# Patient Record
Sex: FEMALE | Race: WHITE | NOT HISPANIC OR LATINO | Employment: OTHER | URBAN - METROPOLITAN AREA
[De-identification: names, ages, dates, MRNs, and addresses within clinical notes are randomized per-mention and may not be internally consistent; named-entity substitution may affect disease eponyms.]

---

## 2017-01-03 ENCOUNTER — OFFICE VISIT (OUTPATIENT)
Dept: URGENT CARE | Facility: CLINIC | Age: 62
End: 2017-01-03
Payer: COMMERCIAL

## 2017-01-03 VITALS
RESPIRATION RATE: 12 BRPM | WEIGHT: 170 LBS | TEMPERATURE: 99 F | DIASTOLIC BLOOD PRESSURE: 80 MMHG | HEIGHT: 63 IN | BODY MASS INDEX: 30.12 KG/M2 | OXYGEN SATURATION: 98 % | SYSTOLIC BLOOD PRESSURE: 120 MMHG | HEART RATE: 78 BPM

## 2017-01-03 DIAGNOSIS — S61.012A LACERATION OF LEFT THUMB, INITIAL ENCOUNTER: ICD-10-CM

## 2017-01-03 PROCEDURE — 99213 OFFICE O/P EST LOW 20 MIN: CPT | Performed by: PHYSICIAN ASSISTANT

## 2017-01-03 ASSESSMENT — ENCOUNTER SYMPTOMS
FEVER: 0
SENSORY CHANGE: 0
VOMITING: 0
FOCAL WEAKNESS: 0
NAUSEA: 0
CHILLS: 0
ROS SKIN COMMENTS: LACERATION OF LEFT THUMB
TINGLING: 0

## 2017-01-03 NOTE — MR AVS SNAPSHOT
"        Mari Kinsey   1/3/2017 6:45 PM   Office Visit   MRN: [id number]    Department:  [facility]   Dept Phone:  [phone]    Description:  Female : 1955   Provider:  Crista rGant PA-C           Reason for Visit     Laceration finger, washing dishes       Allergies as of 1/3/2017     Allergen Noted Reactions    Penicillins 2010   Rash      You were diagnosed with     Laceration of left thumb, initial encounter   [[id number]]         Vital Signs     Blood Pressure Pulse Temperature Respirations Height Weight    120/80 mmHg 78 37.2 °C (99 °F) 12 1.6 m (5' 2.99\") 77.111 kg (170 lb)    Body Mass Index Oxygen Saturation Breastfeeding? Smoking Status          30.12 kg/m2 98% No Passive Smoke Exposure - Never Smoker        Basic Information     Date Of Birth Sex Race Ethnicity Preferred Language    1955 Female White Non- English      Your appointments     2017  2:00 PM   Non Provider 1 with [facility])    [address]  [facility] [id number] [phone]           You will be receiving a confirmation call a few days before your appointment from our automated call confirmation system.            2017  1:30 PM   Annual Exam with Felicia Roca M.D.   [facility]    [address]  [place] [id number] [phone]              Problem List              ICD-10-CM Priority Class Noted - Resolved    Hypothyroid (Chronic) E03.9   2010 - Present    Preventative health care (Chronic) Z00.00   2010 - Present    Bunion of great toe of left foot M21.612   2010 - Present    Deviated nasal septum J34.2   2010 - Present    Dyslipidemia (Chronic) E78.5   2010 - Present    Sleep apnea (Chronic) G47.30   2010 - Present    History of allergic rhinitis (Chronic) Z87.09   2011 - Present    Skin lesion L98.9   2014 - " Present    Family history of breast cancer in sister Z80.3   12/19/2014 - Present    Macular degeneration H35.30   12/19/2014 - Present    Obesity E66.9   12/19/2014 - Present    Microscopic hematuria R31.29   10/16/2015 - Present    Lymphoma (HCC) (Chronic) C85.90   2/11/2016 - Present      Health Maintenance        Date Due Completion Dates    MAMMOGRAM 11/25/2017 11/25/2016, 11/21/2012    IMM PNEUMOCOCCAL 19-64 (ADULT) HIGHEST RISK SERIES (2 of 3 - PCV13) 12/22/2017 12/22/2016    PAP SMEAR 2/22/2019 2/22/2016, 2/2/2015, 1/30/2014    COLONOSCOPY 11/17/2020 11/17/2010    IMM DTaP/Tdap/Td Vaccine (2 - Td) 12/9/2021 12/9/2011            Current Immunizations     Hepatitis B Vaccine Recombivax (Adol/Adult) 12/22/2016  1:37 PM    Influenza TIV (IM) 12/13/2013, 12/13/2012, 12/9/2011 10:01 AM    Influenza Vaccine Quad Inj (Pf) 10/21/2016  9:00 AM, 9/8/2015  1:47 PM, 9/2/2014    Pneumococcal polysaccharide vaccine (PPSV-23) 12/22/2016  1:38 PM    SHINGLES VACCINE 9/2/2014    Tdap Vaccine 12/9/2011 10:02 AM      Below and/or attached are the medications your provider expects you to take. Review all of your home medications and newly ordered medications with your provider and/or pharmacist. Follow medication instructions as directed by your provider and/or pharmacist. Please keep your medication list with you and share with your provider. Update the information when medications are discontinued, doses are changed, or new medications (including over-the-counter products) are added; and carry medication information at all times in the event of emergency situations     Allergies:  PENICILLINS - Rash               Medications  Valid as of: January 04, 2017 - 11:09 AM    Generic Name Brand Name Tablet Size Instructions for use    Levothyroxine Sodium (Tab) SYNTHROID 125 MCG Take 1 Tab by mouth Every morning on an empty stomach.        .                 Medicines prescribed today were sent to:     [facility]/PHARMACY #[id number] - [place] -  [address]    [address] [place] [address]    Phone: [phone] Fax: [phone]    Open 24 Hours?: No    DME [facility] [place]    [address] [place] [address]    Phone: [phone] Fax: [phone]      Medication refill instructions:       If your prescription bottle indicates you have medication refills left, it is not necessary to call your provider’s office. Please contact your pharmacy and they will refill your medication.    If your prescription bottle indicates you do not have any refills left, you may request refills at any time through one of the following ways: The online [employer] system (except Urgent Care), by calling your provider’s office, or by asking your pharmacy to contact your provider’s office with a refill request. Medication refills are processed only during regular business hours and may not be available until the next business day. Your provider may request additional information or to have a follow-up visit with you prior to refilling your medication.   *Please Note: Medication refills are assigned a new Rx number when refilled electronically. Your pharmacy may indicate that no refills were authorized even though a new prescription for the same medication is available at the pharmacy. Please request the medicine by name with the pharmacy before contacting your provider for a refill.        Other Notes About Your Plan       12/22/16 hep b vaccine first in series               MyChart Status: Patient Declined

## 2017-01-04 NOTE — PROGRESS NOTES
Subjective:      Mari Kinsey is a 61 y.o. female who presents with Laceration            Laceration   The incident occurred less than 1 hour ago (patient cut left thumb fingertip while washing a knife ). Pain location: palmar tip of left thumb  The laceration is 1 cm in size. The laceration mechanism was a clean knife. The pain is moderate. The pain has been constant since onset. She reports no foreign bodies present. Her tetanus status is UTD (2011 ).   the patient denies numbness or tingling. She has no joint pain.     Past Medical History   Diagnosis Date   • Thyroid disease    • Dental disorder      upper bridge   • Sleep apnea      cpap   • Snoring      sleep study done   • ALLEN (obstructive sleep apnea)    • Hypothyroidism    • Chickenpox    • Greek measles    • Mumps    • Influenza    • Tonsillitis        Past Surgical History   Procedure Laterality Date   • Other       bone graft to ;hand   • Nasal septal reconstruction     • Bunionectomy     • Tonsillectomy     • Hysteroscopy with video diagnostic     • Bone graft       at 19 years of age   • Kale by laparoscopy  2/3/2016     Procedure: KALE BY LAPAROSCOPY;  Surgeon: Tricia Farah M.D.;  Location: SURGERY SAME DAY [facility] ORS;  Service:    • Lymph node sampling  2/3/2016     Procedure: LYMPH NODE SAMPLING EXCISION, BIOPSY;  Surgeon: Tricia Farah M.D.;  Location: SURGERY SAME DAY [facility] ORS;  Service:        Family History   Problem Relation Age of Onset   • Heart Disease Father    • Arthritis Mother      osteoporosis   • Sleep Apnea Neg Hx        Allergies   Allergen Reactions   • Penicillins Rash       Medications, Allergies, and current problem list reviewed today in Epic      Review of Systems   Constitutional: Negative for fever and chills.   Gastrointestinal: Negative for nausea and vomiting.   Musculoskeletal: Negative for joint pain.   Skin:        Laceration of left thumb    Neurological: Negative for tingling, sensory change  "and focal weakness.     All other systems reviewed and are negative.        Objective:     /80 mmHg  Pulse 78  Temp(Src) 37.2 °C (99 °F)  Resp 12  Ht 1.6 m (5' 2.99\")  Wt 77.111 kg (170 lb)  BMI 30.12 kg/m2  SpO2 98%  Breastfeeding? No     Physical Exam   Constitutional: She is oriented to person, place, and time. She appears well-developed and well-nourished. No distress.   HENT:   Head: Normocephalic and atraumatic.   Eyes: Conjunctivae are normal.   Pulmonary/Chest: Effort normal. No respiratory distress.   Musculoskeletal:        Hands:  Neurological: She is alert and oriented to person, place, and time. No cranial nerve deficit.   Skin: She is not diaphoretic.   Psychiatric: She has a normal mood and affect. Her behavior is normal. Judgment and thought content normal.               Assessment/Plan:     1. Laceration of left thumb, initial encounter    - Superficial wound. Suture placement not appropriate.   - Wound cleansed with copious amounts of saline. Hemostasis achieved with pressure dressing.   - TDAP UTD  -  Antibiotic ointment applied and Wound dressed.   - wound care discussed.   - no antibiotics necessary  .     Differential diagnoses, Supportive care, and indications for immediate follow-up discussed with patient.   Instructed to return to clinic or nearest emergency department for any change in condition, further concerns, or worsening of symptoms.    The patient demonstrated a good understanding and agreed with the treatment plan.    Crista Grant PA-C        "

## 2017-01-23 ENCOUNTER — APPOINTMENT (OUTPATIENT)
Dept: MEDICAL GROUP | Facility: MEDICAL CENTER | Age: 62
End: 2017-01-23
Payer: COMMERCIAL

## 2017-01-26 ENCOUNTER — TELEPHONE (OUTPATIENT)
Dept: MEDICAL GROUP | Facility: MEDICAL CENTER | Age: 62
End: 2017-01-26

## 2017-01-26 ENCOUNTER — NON-PROVIDER VISIT (OUTPATIENT)
Dept: MEDICAL GROUP | Facility: MEDICAL CENTER | Age: 62
End: 2017-01-26

## 2017-01-26 DIAGNOSIS — Z00.00 PREVENTATIVE HEALTH CARE: Chronic | ICD-10-CM

## 2017-01-26 NOTE — TELEPHONE ENCOUNTER
Patient came in for nurse visit appointment to receive second in series hepatitis B vaccination.    Unfortunately we are out of hepatitis B vaccines    Prescription printed for Recombivax 40 µg to get at the pharmacy second series

## 2017-01-27 NOTE — PROGRESS NOTES
Subjective:      Mari Kinsey is a 61 y.o. female who presents with No chief complaint on file.            HPI    ROS       Objective:     There were no vitals taken for this visit.     Physical Exam            Assessment/Plan:     There are no diagnoses linked to this encounter.

## 2017-02-03 ENCOUNTER — NON-PROVIDER VISIT (OUTPATIENT)
Dept: MEDICAL GROUP | Facility: MEDICAL CENTER | Age: 62
End: 2017-02-03
Payer: COMMERCIAL

## 2017-02-03 DIAGNOSIS — Z23 NEED FOR HEPATITIS B VACCINATION: ICD-10-CM

## 2017-02-03 PROCEDURE — 99999 PR NO CHARGE: CPT | Performed by: INTERNAL MEDICINE

## 2017-02-03 PROCEDURE — 90471 IMMUNIZATION ADMIN: CPT | Performed by: INTERNAL MEDICINE

## 2017-02-03 PROCEDURE — 90746 HEPB VACCINE 3 DOSE ADULT IM: CPT | Performed by: INTERNAL MEDICINE

## 2017-02-03 NOTE — PROGRESS NOTES
"Mari Kinsey is a 61 y.o. female here for a non-provider visit for:   HEPATITIS B     Reason for immunization: continue or complete series started at the office  Immunization records indicate need for vaccine: Yes  Minimum interval has been met for this vaccine: Yes  ABN completed: Not Indicated    VIS Dated  7/20/16 was given to patient Yes  All IAC Questionnaire questions were answered “No.\"    Patient tolerated injection and no adverse effects were observed or reported yes.    Pt scheduled for next dose in series: No        "

## 2017-02-03 NOTE — MR AVS SNAPSHOT
Mari Kinsey   2/3/2017 2:30 PM   Non-Provider Visit   MRN: [id number]    Department:  [facility]   Dept Phone:  [phone]    Description:  Female : 1955   Provider:  DAMON RUTHERFORD MA           Allergies as of 2/3/2017     Allergen Noted Reactions    Penicillins 2010   Rash      You were diagnosed with     Need for hepatitis B vaccination   [[id number]]         Vital Signs     Smoking Status                   Passive Smoke Exposure - Never Smoker           Basic Information     Date Of Birth Sex Race Ethnicity Preferred Language    1955 Female White Non- English      Your appointments     2017  1:30 PM   Annual Exam with Felicia Roca M.D.   [facility]    [address]  [place] [id number] [phone]            Aug 04, 2017  2:00 PM   Non Provider 1 with DAMON RUTHERFORD MA   [facility])    [address]  [place] [id number] [phone]           You will be receiving a confirmation call a few days before your appointment from our automated call confirmation system.              Problem List              ICD-10-CM Priority Class Noted - Resolved    Hypothyroid (Chronic) E03.9   2010 - Present    Preventative health care (Chronic) Z00.00   2010 - Present    Bunion of great toe of left foot M21.612   2010 - Present    Deviated nasal septum J34.2   2010 - Present    Dyslipidemia (Chronic) E78.5   2010 - Present    Sleep apnea (Chronic) G47.30   2010 - Present    History of allergic rhinitis (Chronic) Z87.09   2011 - Present    Skin lesion L98.9   2014 - Present    Family history of breast cancer in sister Z80.3   2014 - Present    Macular degeneration H35.30   2014 - Present    Obesity E66.9   2014 - Present    Microscopic hematuria R31.29   10/16/2015 - Present    Lymphoma (HCC) (Chronic)  C85.90   2/11/2016 - Present      Health Maintenance        Date Due Completion Dates    MAMMOGRAM 11/25/2017 11/25/2016, 11/21/2012    IMM PNEUMOCOCCAL 19-64 (ADULT) HIGHEST RISK SERIES (2 of 3 - PCV13) 12/22/2017 12/22/2016    PAP SMEAR 2/22/2019 2/22/2016, 2/2/2015, 1/30/2014    COLONOSCOPY 11/17/2020 11/17/2010    IMM DTaP/Tdap/Td Vaccine (2 - Td) 12/9/2021 12/9/2011            Current Immunizations     Hepatitis B Vaccine Recombivax (Adol/Adult)  Incomplete, 12/22/2016  1:37 PM    Influenza TIV (IM) 12/13/2013, 12/13/2012, 12/9/2011 10:01 AM    Influenza Vaccine Quad Inj (Pf) 10/21/2016  9:00 AM, 9/8/2015  1:47 PM, 9/2/2014    Pneumococcal polysaccharide vaccine (PPSV-23) 12/22/2016  1:38 PM    SHINGLES VACCINE 9/2/2014    Tdap Vaccine 12/9/2011 10:02 AM      Below and/or attached are the medications your provider expects you to take. Review all of your home medications and newly ordered medications with your provider and/or pharmacist. Follow medication instructions as directed by your provider and/or pharmacist. Please keep your medication list with you and share with your provider. Update the information when medications are discontinued, doses are changed, or new medications (including over-the-counter products) are added; and carry medication information at all times in the event of emergency situations     Allergies:  PENICILLINS - Rash               Medications  Valid as of: February 03, 2017 -  2:51 PM    Generic Name Brand Name Tablet Size Instructions for use    Levothyroxine Sodium (Tab) SYNTHROID 125 MCG Take 1 Tab by mouth Every morning on an empty stomach.        .                 Medicines prescribed today were sent to:     [facility]/PHARMACY #[id number] - [place] - [address]    [address] [place] [address]    Phone: [phone] Fax: [phone]    Open 24 Hours?: No    DME [facility] [place]    [address] [place] [address]    Phone: [phone] Fax: [phone]      Medication  refill instructions:       If your prescription bottle indicates you have medication refills left, it is not necessary to call your provider’s office. Please contact your pharmacy and they will refill your medication.    If your prescription bottle indicates you do not have any refills left, you may request refills at any time through one of the following ways: The online [employer] system (except Urgent Care), by calling your provider’s office, or by asking your pharmacy to contact your provider’s office with a refill request. Medication refills are processed only during regular business hours and may not be available until the next business day. Your provider may request additional information or to have a follow-up visit with you prior to refilling your medication.   *Please Note: Medication refills are assigned a new Rx number when refilled electronically. Your pharmacy may indicate that no refills were authorized even though a new prescription for the same medication is available at the pharmacy. Please request the medicine by name with the pharmacy before contacting your provider for a refill.        Other Notes About Your Plan       12/22/16 hep b vaccine first in series  1/26/17 hep b recombivax printed to take to pharmacy  1/17/17  oncology note, doing well with recent CT showing no evidence of adenopathy, repeat labs prior to next visit CBC, CMP, LDH, beta-2 microglobulin, follow-up 6 months               [facility] Status: Patient Declined

## 2017-02-23 ENCOUNTER — HOSPITAL ENCOUNTER (OUTPATIENT)
Facility: MEDICAL CENTER | Age: 62
End: 2017-02-23
Attending: OBSTETRICS & GYNECOLOGY
Payer: COMMERCIAL

## 2017-02-23 ENCOUNTER — GYNECOLOGY VISIT (OUTPATIENT)
Dept: OBGYN | Facility: MEDICAL CENTER | Age: 62
End: 2017-02-23
Payer: COMMERCIAL

## 2017-02-23 VITALS
WEIGHT: 179 LBS | SYSTOLIC BLOOD PRESSURE: 100 MMHG | DIASTOLIC BLOOD PRESSURE: 70 MMHG | HEIGHT: 63 IN | BODY MASS INDEX: 31.71 KG/M2

## 2017-02-23 DIAGNOSIS — Z12.4 ROUTINE CERVICAL SMEAR: ICD-10-CM

## 2017-02-23 DIAGNOSIS — Z11.51 SPECIAL SCREENING EXAMINATION FOR HUMAN PAPILLOMAVIRUS (HPV): ICD-10-CM

## 2017-02-23 DIAGNOSIS — Z01.419 WELL WOMAN EXAM: ICD-10-CM

## 2017-02-23 PROCEDURE — 99396 PREV VISIT EST AGE 40-64: CPT | Performed by: OBSTETRICS & GYNECOLOGY

## 2017-02-23 PROCEDURE — 87624 HPV HI-RISK TYP POOLED RSLT: CPT

## 2017-02-23 PROCEDURE — 88175 CYTOPATH C/V AUTO FLUID REDO: CPT

## 2017-02-23 NOTE — PROGRESS NOTES
Mari Kinsey is a 61 y.o. y.o. female who presents for her Gynecologic Exam        HPI Comments: Pt presents for well woman exam. Pt has no complaints. Lymphoma is improved and almost resolved - does not need Chemo. Pt also seeing Dr. Perez for pelvic floor exercises and started on a estrogen cream. No LMP recorded. Patient is postmenopausal.  .  Review of Systems   Pertinent positives documented in HPI and all other systems reviewed & are negative    All PMH, PSH, allergies, social history and FH reviewed and updated today:  Past Medical History   Diagnosis Date   • Thyroid disease    • Dental disorder      upper bridge   • Sleep apnea      cpap   • Snoring      sleep study done   • ALLEN (obstructive sleep apnea)    • Hypothyroidism    • Chickenpox    • Kazakh measles    • Mumps    • Influenza    • Tonsillitis      Past Surgical History   Procedure Laterality Date   • Other       bone graft to ;hand   • Nasal septal reconstruction     • Bunionectomy     • Tonsillectomy     • Hysteroscopy with video diagnostic     • Bone graft       at 19 years of age   • Kale by laparoscopy  2/3/2016     Procedure: KALE BY LAPAROSCOPY;  Surgeon: Tricia Farah M.D.;  Location: SURGERY SAME DAY [facility] ORS;  Service:    • Lymph node sampling  2/3/2016     Procedure: LYMPH NODE SAMPLING EXCISION, BIOPSY;  Surgeon: Tricia Farah M.D.;  Location: SURGERY SAME DAY [facility] ORS;  Service:      Penicillins  Social History     Social History   • Marital Status:      Spouse Name: N/A   • Number of Children: N/A   • Years of Education: N/A     Social History Main Topics   • Smoking status: Passive Smoke Exposure - Never Smoker   • Smokeless tobacco: Never Used      Comment: parents smoke when she was a child   • Alcohol Use: No   • Drug Use: No   • Sexual Activity: Not Asked     Other Topics Concern   • None     Social History Narrative     Family History   Problem Relation Age of Onset   • Heart Disease Father    •  "Arthritis Mother      osteoporosis   • Sleep Apnea Neg Hx      Medications:   Current Outpatient Prescriptions Ordered in [facility]   Medication Sig Dispense Refill   • levothyroxine (SYNTHROID) 125 MCG Tab Take 1 Tab by mouth Every morning on an empty stomach. 90 Tab 3     No current Epic-ordered facility-administered medications on file.          Objective:   Vital measurements:  Blood pressure 100/70, height 1.6 m (5' 2.99\"), weight 81.194 kg (179 lb).  Body mass index is 31.72 kg/(m^2). (Goal BM I>18 <25)    Physical Exam   Nursing note and vitals reviewed.  Constitutional: She is oriented to person, place, and time. She appears well-developed and well-nourished. No distress.     HEENT:   Head: Normocephalic and atraumatic.   Right Ear: External ear normal.   Left Ear: External ear normal.   Nose: Nose normal.   Eyes: Conjunctivae and EOM are normal. Pupils are equal, round, and reactive to light. No scleral icterus.     Neck: Normal range of motion. Neck supple. No tracheal deviation present. No thyromegaly present.     Pulmonary/Chest: Effort normal and breath sounds normal. No respiratory distress. She has no wheezes. She has no rales. She exhibits no tenderness.     Cardiovascular: Regular, rate and rhythm. No JVD.    Abdominal: Soft. Bowel sounds are normal. She exhibits no distension and no mass. No tenderness. She has no rebound and no guarding.     Breast:  Symmetrical, normal consistency without masses., No dimpling or skin changes, Normal nipples without discharge, no axillary lymphadenopathy, negative    Genitourinary:  Pelvic exam was performed with patient supine.  External genitalia with no abnormal pigmentation, labial fusion,rash, tenderness, lesion or injury to the labia bilaterally.  Vagina is moist with no lesions, foul discharge, erythema, tenderness or bleeding. No foreign body around the vagina or signs of injury. cystocele and rectocele.  Cervix exhibits no motion tenderness, no discharge and " no friability.   Uterus is AV not deviated, not enlarged, not fixed and not tender.  Right adnexum displays no mass, no tenderness and no fullness. Left adnexum displays no mass, no tenderness and no fullness.     Musculoskeletal: Normal range of motion. She exhibits no edema and no tenderness.     Lymphadenopathy: She has no cervical adenopathy.     Neurological: She is alert and oriented to person, place, and time. She exhibits normal muscle tone.     Skin: Skin is warm and dry. No rash noted. She is not diaphoretic. No erythema. No pallor.     Psychiatric: She has a normal mood and affect. Her behavior is normal. Judgment and thought content normal.               Assessment:     1. Well woman exam  THINPREP PAP WITH HPV   2. Routine cervical smear  THINPREP PAP WITH HPV   3. Special screening examination for human papillomavirus (HPV)  THINPREP PAP WITH HPV         Plan:   Pap and physical exam performed  Monthly SBE.  Counseling: breast self exam, mammography screening, use and side effects of HRT, menopause, osteoporosis and adequate intake of calcium and vitamin D  Encourage exercise and proper diet.  Mammograms starting @ age 40 annually.  See medications and orders placed in encounter report.

## 2017-02-23 NOTE — MR AVS SNAPSHOT
"        Mari Kinsey   2017 1:30 PM   Gynecology Visit   MRN: [id number]    Department:  [facility]   Dept Phone:  [phone]    Description:  Female : 1955   Provider:  Felicia Roca M.D.           Reason for Visit     Gynecologic Exam Annual exam       Allergies as of 2017     Allergen Noted Reactions    Penicillins 2010   Rash      You were diagnosed with     Well woman exam   [859638]       Routine cervical smear   [760704]       Special screening examination for human papillomavirus (HPV)   [V73.81.ICD-9-CM]         Vital Signs     Blood Pressure Height Weight Body Mass Index Smoking Status       100/70 mmHg 1.6 m (5' 2.99\") 81.194 kg (179 lb) 31.72 kg/m2 Passive Smoke Exposure - Never Smoker       Basic Information     Date Of Birth Sex Race Ethnicity Preferred Language    1955 Female White Non- English      Your appointments     Aug 04, 2017  2:00 PM   Non Provider 1 with [facility] ([facility])    [address]  [facility] [address]   [phone]           You will be receiving a confirmation call a few days before your appointment from our automated call confirmation system.              Problem List              ICD-10-CM Priority Class Noted - Resolved    Hypothyroid (Chronic) E03.9   2010 - Present    Preventative health care (Chronic) Z00.00   2010 - Present    Bunion of great toe of left foot M21.612   2010 - Present    Deviated nasal septum J34.2   2010 - Present    Dyslipidemia (Chronic) E78.5   2010 - Present    Sleep apnea (Chronic) G47.30   2010 - Present    History of allergic rhinitis (Chronic) Z87.09   2011 - Present    Skin lesion L98.9   2014 - Present    Family history of breast cancer in sister Z80.3   2014 - Present    Macular degeneration H35.30   2014 - Present    Obesity E66.9   2014 - Present    Microscopic hematuria " R31.29   10/16/2015 - Present    Lymphoma (HCC) (Chronic) C85.90   2/11/2016 - Present      Health Maintenance        Date Due Completion Dates    MAMMOGRAM 11/25/2017 11/25/2016, 11/21/2012    IMM PNEUMOCOCCAL 19-64 (ADULT) HIGHEST RISK SERIES (2 of 3 - PCV13) 12/22/2017 12/22/2016    PAP SMEAR 2/23/2020 2/23/2017 (Done), 2/22/2016, 2/2/2015, 1/30/2014    Override on 2/23/2017: Done    COLONOSCOPY 11/17/2020 11/17/2010    IMM DTaP/Tdap/Td Vaccine (2 - Td) 12/9/2021 12/9/2011            Current Immunizations     Hepatitis B Vaccine Recombivax (Adol/Adult) 2/3/2017  3:00 PM, 12/22/2016  1:37 PM    Influenza TIV (IM) 12/13/2013, 12/13/2012, 12/9/2011 10:01 AM    Influenza Vaccine Quad Inj (Pf) 10/21/2016  9:00 AM, 9/8/2015  1:47 PM, 9/2/2014    Pneumococcal polysaccharide vaccine (PPSV-23) 12/22/2016  1:38 PM    SHINGLES VACCINE 9/2/2014    Tdap Vaccine 12/9/2011 10:02 AM      Below and/or attached are the medications your provider expects you to take. Review all of your home medications and newly ordered medications with your provider and/or pharmacist. Follow medication instructions as directed by your provider and/or pharmacist. Please keep your medication list with you and share with your provider. Update the information when medications are discontinued, doses are changed, or new medications (including over-the-counter products) are added; and carry medication information at all times in the event of emergency situations     Allergies:  PENICILLINS - Rash               Medications  Valid as of: February 23, 2017 -  2:21 PM    Generic Name Brand Name Tablet Size Instructions for use    Levothyroxine Sodium (Tab) SYNTHROID 125 MCG Take 1 Tab by mouth Every morning on an empty stomach.        .                 Medicines prescribed today were sent to:     [facility]/PHARMACY #[id number] - [place], [place] - [address]    [address] [place] [address]    Phone: [phone] Fax: [phone]    Open 24 Hours?: No    DME  [facility]    [address] [place] [address]    Phone: [phone] Fax: [phone]      Medication refill instructions:       If your prescription bottle indicates you have medication refills left, it is not necessary to call your provider’s office. Please contact your pharmacy and they will refill your medication.    If your prescription bottle indicates you do not have any refills left, you may request refills at any time through one of the following ways: The online [facility] system (except Urgent Care), by calling your provider’s office, or by asking your pharmacy to contact your provider’s office with a refill request. Medication refills are processed only during regular business hours and may not be available until the next business day. Your provider may request additional information or to have a follow-up visit with you prior to refilling your medication.   *Please Note: Medication refills are assigned a new Rx number when refilled electronically. Your pharmacy may indicate that no refills were authorized even though a new prescription for the same medication is available at the pharmacy. Please request the medicine by name with the pharmacy before contacting your provider for a refill.        Your To Do List     Future Labs/Procedures Complete By Expires    THINPREP PAP WITH HPV  As directed 2/23/2018      Instructions    RTO in 1 year       Other Notes About Your Plan       12/22/16 hep b vaccine first in series  1/26/17 hep b recombivax printed to take to pharmacy  1/17/17  oncology note, doing well with recent CT showing no evidence of adenopathy, repeat labs prior to next visit CBC, CMP, LDH, beta-2 microglobulin, follow-up 6 months               [employer] [facility] Status: Patient Declined

## 2017-02-24 LAB
CYTOLOGY REG CYTOL: NORMAL
HPV HR 12 DNA CVX QL NAA+PROBE: NEGATIVE
HPV16 DNA SPEC QL NAA+PROBE: NEGATIVE
HPV18 DNA SPEC QL NAA+PROBE: NEGATIVE
SPECIMEN SOURCE: NORMAL

## 2017-07-12 ENCOUNTER — HOSPITAL ENCOUNTER (OUTPATIENT)
Dept: LAB | Facility: MEDICAL CENTER | Age: 62
End: 2017-07-12
Attending: INTERNAL MEDICINE
Payer: COMMERCIAL

## 2017-07-12 LAB
ALBUMIN SERPL BCP-MCNC: 3.7 G/DL (ref 3.2–4.9)
ALBUMIN/GLOB SERPL: 1.2 G/DL
ALP SERPL-CCNC: 65 U/L (ref 30–99)
ALT SERPL-CCNC: 11 U/L (ref 2–50)
ANION GAP SERPL CALC-SCNC: 6 MMOL/L (ref 0–11.9)
AST SERPL-CCNC: 15 U/L (ref 12–45)
BASOPHILS # BLD AUTO: 0.7 % (ref 0–1.8)
BASOPHILS # BLD: 0.05 K/UL (ref 0–0.12)
BILIRUB SERPL-MCNC: 0.4 MG/DL (ref 0.1–1.5)
BUN SERPL-MCNC: 15 MG/DL (ref 8–22)
CALCIUM SERPL-MCNC: 9.1 MG/DL (ref 8.5–10.5)
CHLORIDE SERPL-SCNC: 105 MMOL/L (ref 96–112)
CO2 SERPL-SCNC: 27 MMOL/L (ref 20–33)
CREAT SERPL-MCNC: 0.83 MG/DL (ref 0.5–1.4)
EOSINOPHIL # BLD AUTO: 1.04 K/UL (ref 0–0.51)
EOSINOPHIL NFR BLD: 14.6 % (ref 0–6.9)
ERYTHROCYTE [DISTWIDTH] IN BLOOD BY AUTOMATED COUNT: 45.5 FL (ref 35.9–50)
GFR SERPL CREATININE-BSD FRML MDRD: >60 ML/MIN/1.73 M 2
GLOBULIN SER CALC-MCNC: 3 G/DL (ref 1.9–3.5)
GLUCOSE SERPL-MCNC: 84 MG/DL (ref 65–99)
HCT VFR BLD AUTO: 43.4 % (ref 37–47)
HGB BLD-MCNC: 13.8 G/DL (ref 12–16)
IMM GRANULOCYTES # BLD AUTO: 0.02 K/UL (ref 0–0.11)
IMM GRANULOCYTES NFR BLD AUTO: 0.3 % (ref 0–0.9)
LDH SERPL-CCNC: 142 U/L (ref 107–266)
LYMPHOCYTES # BLD AUTO: 2.14 K/UL (ref 1–4.8)
LYMPHOCYTES NFR BLD: 30.1 % (ref 22–41)
MCH RBC QN AUTO: 30.4 PG (ref 27–33)
MCHC RBC AUTO-ENTMCNC: 31.8 G/DL (ref 33.6–35)
MCV RBC AUTO: 95.6 FL (ref 81.4–97.8)
MONOCYTES # BLD AUTO: 0.63 K/UL (ref 0–0.85)
MONOCYTES NFR BLD AUTO: 8.9 % (ref 0–13.4)
NEUTROPHILS # BLD AUTO: 3.22 K/UL (ref 2–7.15)
NEUTROPHILS NFR BLD: 45.4 % (ref 44–72)
NRBC # BLD AUTO: 0 K/UL
NRBC BLD AUTO-RTO: 0 /100 WBC
PLATELET # BLD AUTO: 272 K/UL (ref 164–446)
PMV BLD AUTO: 10.7 FL (ref 9–12.9)
POTASSIUM SERPL-SCNC: 4 MMOL/L (ref 3.6–5.5)
PROT SERPL-MCNC: 6.7 G/DL (ref 6–8.2)
RBC # BLD AUTO: 4.54 M/UL (ref 4.2–5.4)
SODIUM SERPL-SCNC: 138 MMOL/L (ref 135–145)
WBC # BLD AUTO: 7.1 K/UL (ref 4.8–10.8)

## 2017-07-12 PROCEDURE — 80053 COMPREHEN METABOLIC PANEL: CPT

## 2017-07-12 PROCEDURE — 82232 ASSAY OF BETA-2 PROTEIN: CPT

## 2017-07-12 PROCEDURE — 36415 COLL VENOUS BLD VENIPUNCTURE: CPT

## 2017-07-12 PROCEDURE — 85025 COMPLETE CBC W/AUTO DIFF WBC: CPT

## 2017-07-12 PROCEDURE — 83615 LACTATE (LD) (LDH) ENZYME: CPT

## 2017-07-13 LAB — B2 MICROGLOB SERPL-MCNC: 2.1 MG/L (ref 1.1–2.4)

## 2017-08-04 ENCOUNTER — NON-PROVIDER VISIT (OUTPATIENT)
Dept: MEDICAL GROUP | Facility: MEDICAL CENTER | Age: 62
End: 2017-08-04
Payer: COMMERCIAL

## 2017-08-04 DIAGNOSIS — Z23 NEED FOR HEPATITIS B VACCINATION: ICD-10-CM

## 2017-08-04 PROCEDURE — 90746 HEPB VACCINE 3 DOSE ADULT IM: CPT | Performed by: INTERNAL MEDICINE

## 2017-08-04 PROCEDURE — 90471 IMMUNIZATION ADMIN: CPT | Performed by: INTERNAL MEDICINE

## 2017-08-04 NOTE — PROGRESS NOTES
"Mari Kinsey is a 61 y.o. female here for a non-provider visit for:   HEPATITIS B 3 of 3    Reason for immunization: Overdue/Provider Recommended  Immunization records indicate need for vaccine: Yes, confirmed with personal records  Minimum interval has been met for this vaccine: Yes  ABN completed: Not Indicated    Order and dose verified by: Dr. Arleth CASAS Dated  07/20/2016 was given to patient: Yes  All IAC Questionnaire questions were answered “No.\"    Patient tolerated injection and no adverse effects were observed or reported: Yes    Pt scheduled for next dose in series: Not Indicated    "

## 2017-08-04 NOTE — MR AVS SNAPSHOT
Mari Kinsey   2017 2:00 PM   Non-Provider Visit   MRN: [id number]    Department:  [facility]   Dept Phone:  [phone]    Description:  Female : 1955   Provider:  [facility]           Reason for Visit     Immunizations 3rd HEPB      Allergies as of 2017     Allergen Noted Reactions    Penicillins 2010   Rash      You were diagnosed with     Need for hepatitis B vaccination   [[id number]]         Vital Signs     Smoking Status                   Passive Smoke Exposure - Never Smoker           Basic Information     Date Of Birth Sex Race Ethnicity Preferred Language    1955 Female White Non- English      Your appointments     Sep 20, 2017  1:20 PM   Follow UP with ZECHARIAH Gomez   [facility] (--)    [address] [place] [id number] [phone]              Problem List              ICD-10-CM Priority Class Noted - Resolved    Hypothyroid (Chronic) E03.9   2010 - Present    Preventative health care (Chronic) Z00.00   2010 - Present    Bunion of great toe of left foot M21.612   2010 - Present    Deviated nasal septum J34.2   2010 - Present    Dyslipidemia (Chronic) E78.5   2010 - Present    Sleep apnea (Chronic) G47.30   2010 - Present    History of allergic rhinitis (Chronic) Z87.09   2011 - Present    Skin lesion L98.9   2014 - Present    Family history of breast cancer in sister Z80.3   2014 - Present    Macular degeneration H35.30   2014 - Present    Obesity E66.9   2014 - Present    Microscopic hematuria R31.29   10/16/2015 - Present    Lymphoma (HCC) (Chronic) C85.90   2016 - Present      Health Maintenance        Date Due Completion Dates    IMM INFLUENZA (1) 2017 10/21/2016, 2015, 2014, 2013, 2012, 2011    MAMMOGRAM 2017, 2012    IMM PNEUMOCOCCAL 19-64 (ADULT) HIGHEST RISK SERIES  (2 of 3 - PCV13) 12/22/2017 12/22/2016    PAP SMEAR 2/23/2020 2/23/2017, 2/23/2017 (Done), 2/22/2016, 2/2/2015, 1/30/2014    Override on 2/23/2017: Done    COLONOSCOPY 11/17/2020 11/17/2010    IMM DTaP/Tdap/Td Vaccine (2 - Td) 12/9/2021 12/9/2011            Current Immunizations     Hepatitis B Vaccine Recombivax (Adol/Adult) 8/4/2017, 2/3/2017  3:00 PM, 12/22/2016  1:37 PM    Influenza TIV (IM) 12/13/2013, 12/13/2012, 12/9/2011 10:01 AM    Influenza Vaccine Quad Inj (Pf) 10/21/2016  9:00 AM, 9/8/2015  1:47 PM, 9/2/2014    Pneumococcal polysaccharide vaccine (PPSV-23) 12/22/2016  1:38 PM    SHINGLES VACCINE 9/2/2014    Tdap Vaccine 12/9/2011 10:02 AM      Below and/or attached are the medications your provider expects you to take. Review all of your home medications and newly ordered medications with your provider and/or pharmacist. Follow medication instructions as directed by your provider and/or pharmacist. Please keep your medication list with you and share with your provider. Update the information when medications are discontinued, doses are changed, or new medications (including over-the-counter products) are added; and carry medication information at all times in the event of emergency situations     Allergies:  PENICILLINS - Rash               Medications  Valid as of: August 04, 2017 -  2:27 PM    Generic Name Brand Name Tablet Size Instructions for use    Levothyroxine Sodium (Tab) SYNTHROID 125 MCG Take 1 Tab by mouth Every morning on an empty stomach.        .                 Medicines prescribed today were sent to:     [facility]/PHARMACY #[id number] - [place], [place] - [address]    [address] [place] [address]    Phone: [phone] Fax: [phone]    Open 24 Hours?: No    [facility] [place]    [address] [place] [address]    Phone: [phone] Fax: [phone]    [facility] - [place], [place] - [address] [facility]    [address]  [place] [address]    Phone: [phone] Fax: [phone]    Open 24 Hours?: No      Medication refill instructions:       If your prescription bottle indicates you have medication refills left, it is not necessary to call your provider’s office. Please contact your pharmacy and they will refill your medication.    If your prescription bottle indicates you do not have any refills left, you may request refills at any time through one of the following ways: The online [employer] system (except Urgent Care), by calling your provider’s office, or by asking your pharmacy to contact your provider’s office with a refill request. Medication refills are processed only during regular business hours and may not be available until the next business day. Your provider may request additional information or to have a follow-up visit with you prior to refilling your medication.   *Please Note: Medication refills are assigned a new Rx number when refilled electronically. Your pharmacy may indicate that no refills were authorized even though a new prescription for the same medication is available at the pharmacy. Please request the medicine by name with the pharmacy before contacting your provider for a refill.        Other Notes About Your Plan       12/22/16 hep b vaccine first in series  1/26/17 hep b recombivax printed to take to pharmacy  1/17/17  oncology note, doing well with recent CT showing no evidence of adenopathy, repeat labs prior to next visit CBC, CMP, LDH, beta-2 microglobulin, follow-up 6 months               MyChart Status: Patient Declined

## 2017-09-20 ENCOUNTER — APPOINTMENT (OUTPATIENT)
Dept: SLEEP MEDICINE | Facility: MEDICAL CENTER | Age: 62
End: 2017-09-20
Payer: COMMERCIAL

## 2017-10-10 ENCOUNTER — SLEEP CENTER VISIT (OUTPATIENT)
Dept: SLEEP MEDICINE | Facility: MEDICAL CENTER | Age: 62
End: 2017-10-10
Payer: COMMERCIAL

## 2017-10-10 VITALS
WEIGHT: 189 LBS | HEART RATE: 78 BPM | OXYGEN SATURATION: 98 % | SYSTOLIC BLOOD PRESSURE: 122 MMHG | BODY MASS INDEX: 33.49 KG/M2 | DIASTOLIC BLOOD PRESSURE: 80 MMHG | HEIGHT: 63 IN | RESPIRATION RATE: 16 BRPM

## 2017-10-10 DIAGNOSIS — G47.33 OBSTRUCTIVE SLEEP APNEA SYNDROME: Chronic | ICD-10-CM

## 2017-10-10 PROCEDURE — 99213 OFFICE O/P EST LOW 20 MIN: CPT | Performed by: FAMILY MEDICINE

## 2017-10-10 NOTE — PROGRESS NOTES
[facility] Follow Up Note     Date: 10/10/2017 / Time: 5:22 PM    Patient ID:   Name:             Mari Kinsey   YOB: 1955  Age:                 61 y.o.  female   MRN:               [id number]      Thank you for requesting a sleep medicine consultation on Mari Kinsey at the sleep center. She presents today with the chief complaints of ALLEN  follow up.     HISTORY OF PRESENT ILLNESS:        PSG from 1/2011 indicated an AHI of 28.3 and low oxygen of 83%. Currently she is being treated with CPAP 9cmH20. She goes to sleep around 10:30 pm and wakes up around 5:30 am. She is getting about 8 hrs of sleep on a good night and about 6.5 hr of sleep on a bad night. The bad nights are about 2-3 per week. She is using CPAP most days of the week. Pt reports  hrs of average nightly use of CPAP. Pt denies snoring, gasping,choking.Pt also denies significant mask leak that is interfering with sleep.      The 30 day compliance was downloaded which shows adequate compliance with more that 4 hr usage about %. The AHI is has improved to 0.8/hr. The mask leak is normal of 4L/min. The symptoms of excessive daytime, snoring and gasping has resolved.             REVIEW OF SYSTEMS:       Constitutional: Denies fevers, Denies weight changes  Eyes: Denies changes in vision, no eye pain  Ears/Nose/Throat/Mouth: Denies nasal congestion or sore throat   Cardiovascular: Denies chest pain or palpitations   Respiratory: Denies shortness of breath , Denies cough  Gastrointestinal/Hepatic: Denies abdominal pain, nausea, vomiting, diarrhea, constipation or GI bleeding   Genitourinary: Denies bladder dysfunction, dysuria or frequency  Musculoskeletal/Rheum: Denies  joint pain and swelling   Skin/Breast: Denies rash,   Neurological: Denies headache, confusion, memory loss or focal weakness/parasthesias  Psychiatric: denies mood disorder     Comprehensive review of systems form is reviewed with the patient and is  "attached in the EMR.     PMH:  has a past medical history of Chickenpox; Dental disorder; Faroese measles; Hypothyroidism; Influenza; Mumps; ALLEN (obstructive sleep apnea); Sleep apnea; Snoring; Thyroid disease; and Tonsillitis.  MEDS:   Current Outpatient Prescriptions:   •  levothyroxine (SYNTHROID) 125 MCG Tab, Take 1 Tab by mouth Every morning on an empty stomach., Disp: 90 Tab, Rfl: 1  ALLERGIES:   Allergies   Allergen Reactions   • Penicillins Rash     SURGHX:   Past Surgical History:   Procedure Laterality Date   • KALE BY LAPAROSCOPY  2/3/2016    Procedure: KALE BY LAPAROSCOPY;  Surgeon: Tricia Farah M.D.;  Location: SURGERY SAME DAY [facility];  Service:    • LYMPH NODE SAMPLING  2/3/2016    Procedure: LYMPH NODE SAMPLING EXCISION, BIOPSY;  Surgeon: Tricia Farah M.D.;  Location: SURGERY SAME DAY [facility];  Service:    • BONE GRAFT      at 19 years of age   • BUNIONECTOMY     • HYSTEROSCOPY WITH VIDEO DIAGNOSTIC     • NASAL SEPTAL RECONSTRUCTION     • OTHER      bone graft to ;hand   • TONSILLECTOMY       SOCHX:  reports that she is a non-smoker but has been exposed to tobacco smoke. She has never used smokeless tobacco. She reports that she does not drink alcohol or use drugs..   FH:   Family History   Problem Relation Age of Onset   • Heart Disease Father    • Arthritis Mother      osteoporosis   • Sleep Apnea Neg Hx          Physical Exam:  Vitals/ General Appearance:   Weight/BMI: Body mass index is 33.49 kg/m².  Blood pressure 122/80, pulse 78, resp. rate 16, height 1.6 m (5' 2.99\"), weight 85.7 kg (189 lb), SpO2 98 %.  Vitals:    10/10/17 1259   BP: 122/80   Pulse: 78   Resp: 16   SpO2: 98%   Weight: 85.7 kg (189 lb)   Height: 1.6 m (5' 2.99\")       Pt. is alert and oriented to time, place and person. Cooperative and in no apparent distress.       1. Head: Atraumatic, normocephalic.   2. Ears: Normal tympanic membrane and no discharge  3. Nose: No inferior turbinate hypertophy, no septal " deviation, no polyp.   4. Throat: Oropharynx appears crowded in that the palate is overhanging (Malam Kourtney scale 4.  enlarged, uvula is large Tongue is/not enlarged.   5. Neck: Supple. No thyromegaly  6. Chest: Trachea central, no spine deformity   7. Lungs auscultation: B/L good air entry, vesicular breath sounds, no adventitious sounds  8. Heart auscultation: 1st and 2nd heart sounds normal, regular rhythm. 2+murmur.  9. Abdomen: Soft, non tender, no organomegaly. Bowel sounds present  10. Extremities: No, no deformity, no clubbing, no pedal edema.  11. Skin: No rash  12. NEUROLOGICAL EXAMINATION: On neurological exam, the patient was alert and oriented x3. speech was clear and fluent without dysarthria. Motor exam revealed normal bulk, tone and strength 5/5, which was symmetrical in the upper and lower extremities bilaterally.        INVESTIGATIONS:   Usage 09/10/2017 - 10/09/2017  Usage days 29/30 days (97%)  >= 4 hours 29 days (97%)  Usage hours 204 hours 42 minutes  Average usage (total days) 6 hours 49 minutes  Average usage (days used) 7 hours 4 minutes  Median usage (days used) 7 hours 1 minutes  Set pressure 9 cmH2O  EPR level 1Therapy  Leaks - L/min 95th percentile: 4.5  Events per hour AHI: 0.8            ASSESSMENT AND PLAN     1.Obstructive Sleep Apnea (ALLEN).She  Is currently on CPAP of 9 cm with Quattro Fx medium mask. 30 day compliance was downloaded which shows adequate compliance. The symptoms of excessive daytime, snoring and gasping has resolved      The pathophysiology of ALLEN and the increased risk of cardiovascular morbidity from untreated ALLEN is discussed in detail with the patient.     She is urged to avoid supine sleep, weight gain and alcoholic beverages since all of these can worsen ALLEN. She is cautioned against drowsy driving. If She feels sleepy while driving, She must pull over for a break/nap, rather than persist on the road, in the interest of She own safety and that of others on the  road.   Plan   - Continue 9 at  cm with Quattro Fx medium mask    - Rx for supplies sent to DME    - compliance download reviewed and discussed with the pt    2.  Regarding treatment of other past medical problems and general health maintenance,  She is urged to follow up with PCP.    F/U in 1 year

## 2017-10-12 ENCOUNTER — NON-PROVIDER VISIT (OUTPATIENT)
Dept: MEDICAL GROUP | Facility: MEDICAL CENTER | Age: 62
End: 2017-10-12
Payer: COMMERCIAL

## 2017-10-12 DIAGNOSIS — Z23 NEEDS FLU SHOT: ICD-10-CM

## 2017-10-12 PROCEDURE — 90686 IIV4 VACC NO PRSV 0.5 ML IM: CPT | Performed by: INTERNAL MEDICINE

## 2017-10-12 PROCEDURE — 90471 IMMUNIZATION ADMIN: CPT | Performed by: INTERNAL MEDICINE

## 2017-10-12 NOTE — PROGRESS NOTES
"Mari Kinsey is a 61 y.o. female here for a non-provider visit for:   FLU    Reason for immunization: Annual Flu Vaccine  Immunization records indicate need for vaccine: Yes, confirmed with Epic  Minimum interval has been met for this vaccine: Yes  ABN completed: Not Indicated    Order and dose verified by: [facility]      VIS Dated  8/7/15 was given to patient: Yes  All IAC Questionnaire questions were answered \"No.\"    Patient tolerated injection and no adverse effects were observed or reported: Yes    Pt scheduled for next dose in series: Not Indicated    "

## 2017-10-20 ENCOUNTER — OFFICE VISIT (OUTPATIENT)
Dept: MEDICAL GROUP | Facility: MEDICAL CENTER | Age: 62
End: 2017-10-20
Payer: COMMERCIAL

## 2017-10-20 VITALS
TEMPERATURE: 97 F | SYSTOLIC BLOOD PRESSURE: 120 MMHG | HEART RATE: 76 BPM | WEIGHT: 188 LBS | DIASTOLIC BLOOD PRESSURE: 66 MMHG | OXYGEN SATURATION: 97 % | HEIGHT: 63 IN | BODY MASS INDEX: 33.31 KG/M2

## 2017-10-20 DIAGNOSIS — Z12.11 COLON CANCER SCREENING: ICD-10-CM

## 2017-10-20 DIAGNOSIS — Z00.00 PREVENTATIVE HEALTH CARE: Chronic | ICD-10-CM

## 2017-10-20 DIAGNOSIS — R01.1 HEART MURMUR: ICD-10-CM

## 2017-10-20 DIAGNOSIS — E66.9 CLASS 1 OBESITY WITHOUT SERIOUS COMORBIDITY IN ADULT, UNSPECIFIED BMI, UNSPECIFIED OBESITY TYPE: ICD-10-CM

## 2017-10-20 PROCEDURE — 99213 OFFICE O/P EST LOW 20 MIN: CPT | Performed by: INTERNAL MEDICINE

## 2017-10-20 NOTE — PROGRESS NOTES
Subjective:      Mari Kinsey is a 61 y.o. female who presents with Heart Murmur (Discuss)            HPI      Patient seen by pulmonary for sleep apnea, murmur appreciated to auscultation by specialist, no previous murmur in the past, no echo, no history of valvular heart disease, no history rheumatic fever, no chest pain, short of breath, cough, lightheadedness, syncope, or palpitation.  Keeps active at work, over 10,000 steps per day, no shortness of breath or lightheadedness with activity.  No history of hypertension.  Cpap 9 for sleep apnea using nightly  Lymphoma followed by oncology no fevers, chills, night sweats no weight loss      Current Outpatient Prescriptions   Medication Sig Dispense Refill   • levothyroxine (SYNTHROID) 125 MCG Tab Take 1 Tab by mouth Every morning on an empty stomach. 90 Tab 1     No current facility-administered medications for this visit.      Patient Active Problem List    Diagnosis Date Noted   • Lymphoma (HCC) 02/11/2016   • Microscopic hematuria 10/16/2015   • Skin lesion 12/19/2014   • Family history of breast cancer in sister 12/19/2014   • Macular degeneration 12/19/2014   • Obesity 12/19/2014   • History of allergic rhinitis 12/09/2011   • Sleep apnea 09/24/2010   • Preventative health care 09/17/2010   • Bunion of great toe of left foot 09/17/2010   • Deviated nasal septum 09/17/2010   • Dyslipidemia 09/17/2010   • Hypothyroid 09/16/2010           Sleep apnea  2/11 [facility] sleep study moderate obstructive sleep apnea syndrome with apnea-hypotony index 28, low saturation 83%, successful CPAP titration final pressure 9 cm with improvement in apnea-hypotony index to 1.5 with low saturation 91%  3/11 [facility] note on cpap 9  3/12 [facility] note on cpap 9  12/12 off cpap  12/14 back on cpap 9  12/20/15 on cpap 9     Skin lesion followed by dermatology Dr. Mondragon     Preventative health  10/17/10 [facility] colon negative repeat 10 yrs  12/9/11 tdap   2/14  gyn  9/2/14 [facility]  11/25/16 mammogram done [facility] negative  12/22/16 pneumovax  12/22/16 hep b vaccine  12/22/16 vit d 22, start 2000 units daily  12/29/16 dexa LS -0.7,hip +0.8  1/26/17 hep b vaccine second in series printed to get at pharmacy     Microscopic hematuria  12/19/14 UA 2-5 RBC repeat in 1 month  1/21/15 UA trace blood   1/27/15 ultrasound renal negative  2/15 drop of urine one more time, send out UA evaluate microscopic hematuria , if still positive for urology  2/27/15 UA positive blood refer to urology  4/4/15 CT abdomen and pelvis with and without; retroperitoneal periaortic pericaval and retrocrural adenopathy, mesenteric adenopathy, no hydronephrosis or urolithiasis  10/16/15 urology note microscopic hematuria workup, CT urogram negative, cystoscopy bladder neck inflammation with cytology negative, hematuria has resolved; followup 6 months  4/13/16 [place]rology [place] note UA and cytology     Macular degeneration     Lymphoma  4/4/15 CT abdomen and pelvis with and without; retroperitoneal periaortic pericaval and retrocrural adenopathy, mesenteric adenopathy, no hydronephrosis or urolithiasis  12/11/15 ,,alk phos 170,bili 1.2   12/20/15 ultrasound liver pending, repeat liver enzymes and secondary workup pending, CT abdomen and pelvis ordered at urgent care pending follow-up lymphadenopathy  12/24/15 ultrasound abdomen and biliary tree, prominent liver size, no focal mass, gallbladder contains numerous mobile stones, no thickening or pericholecystic fluid is noted, maximum diameter common bile duct 9.8 which is enlarged, 2.6 x 2.2 x 2.6 cm nodule hypoechoic area in the pancreatic body not well visualized because of gas  12/24/15 AST 34,ALT 76,GGT 76.alk phos 88,bili 0.5,acute hep panel negative, iron 45,%sat 13,ferritin 67,AMA 22 (20-25 equivocal),F-actin Ab IgG negative,RE neg,SPEP neg  12/26/15 CT with contrast pending follow-up adenopathy on previous CT and possible pancreatic  lesion on ultrasound, referral to surgery for cholelithiasis.  1/12/16  surgery scheduled for laparoscopic cholecystectomy, laparoscopic biopsy of lymph nodes, conversion to open procedure to complete biopsy if necessary to rule out lymphoma  2/3/16 laparoscopic mesenteric lymph node biopsy limited sampling, partial involvement follicular lymphoma B-cell origin favor low-grade 1-2/3, flow cytometry CD10 monoclonal B cells, demonstrating lambda light chain expression and call expression of CD10 with CD19, CD20, negative for CD5 and CD43  2/19/16 CT/PET hypermetabolic activity within retrocrural, periaortic, aortocaval space, proximal pelvic adenopathy within the left common iliac chain, hypermetabolic adenopathy within mesentery  3/16/16  oncology consultation; 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 consistent with low-grade non-hodgkin's lymphoma follicular-type CD10 and CD20 positive, likely low-grade apparently indolent course, most likely will need observation, recommend complete workup including bone marrow biopsy, LDH, CMP,and once complete will calculate FLPI score  4/6/16  oncology note wbc 4.6,hgb 13.9,hct 43,plt 240; stage IIIa follicular lymphoma low-grade, recommend observation follow-up every 3 months first year and then every 6 months, CBC, CMP, LDH, beta-2 microglobulin and imaging studies as indicated, follow up 3 months  7/6/16  oncology note, wbc 7.7,hgb 13.6,hct 40,plt 304, ,beta 2 microglobulin 2.5 (1.1-2.4),uric 5.7;follow up 3 months  10/5/16  oncology note clinically no signs of recurrence, repeat CT chest, abdomen, pelvis in january, CMP, LDH today, follow-up 3 months  12/27/16 wbc 7.7 (49%N,30%L)  12/29/16 CT abdomen and pelvis with; interval resolution of retrocrural, para-aortic, paracaval, and mesenteric adenopathy, no adenopathy appreciated in pelvis, stable 2 cm or less diameter nodules right middle  lobe  1/17/17  oncology note, doing well with recent CT showing no evidence of adenopathy, repeat labs prior to next visit CBC, CMP, LDH, beta-2 microglobulin, follow-up 6 months  7/12/17 cbc,cmp normal,,beta 2 microglobulin  7/18/17  oncology note no evidence disease progression     Hypothyroid  9/10 tsh 1.9 on levothyroxine 112 mcg  12/9/11 tsh 4.1 on levothyroxine 112 mcg; increase to levothyroxine 125 mcg  2/12 tsh 2.4 cont levothyroxine 125 mcg  1/13 tsh 3.5 on levothyroxine 125 mcg  2/28/14 tsh 3.9 on levothyroxine 125 mcg  12/19/14 tsh 3.8 on levothyroxine 125 mcg  12/24/15 tsh 2.0 on levothyroxine 125 mcg  12/22/16 tsh 3.3 on levothyroxine 125 mcg      History allergic rhinitis  2/12  allergy note skin testing positive reactions trees, grasses, weeds,cat and dog, molds, dust mite  12/12 allergy shots per  allergist; off flonase  12/13 allergy shots  every 2-3 weeks  12/20/15 sees  once a month allergy shots     Dyslipidemia  9/10 chol 222,trig 144,hdl 48,ldl 144  12/9/11 chol 230,trig 106,hdl 55,ldl 154  1/13 chol 224,trig 109,hdl 46,ldl 156; 10 yr risk 4%  2/28/14 chol 226,trig 127,hdl 64,ldl 137  12/19/14 chol 205,trig 111,hdl 51,ldl 132  4/23/15 chol 212,trig 106,hdl 58,ldl 133  12/24/15 chol 212,trig 56,hdl 62,ldl 139  12/27/16 chol 231,trig 72,hdl 65,ldl 152     Deviated nasal septum     Bunion  Sees podiatry   12/11 podiatry note  1/13 bunion surgery                        ROS       Objective:          Physical Exam   Constitutional: She appears well-developed and well-nourished. No distress.   HENT:   Head: Normocephalic and atraumatic.   Right Ear: External ear normal.   Left Ear: External ear normal.   Mouth/Throat: Oropharynx is clear and moist.   Eyes: Conjunctivae are normal. Right eye exhibits no discharge. Left eye exhibits no discharge.   Neck: Neck supple.   Cardiovascular: Normal rate and regular rhythm.     Murmur heard.  Pulmonary/Chest: Effort normal and breath sounds normal. No respiratory distress. She has no wheezes. She has no rales.   Abdominal: She exhibits no distension.   Musculoskeletal: She exhibits no edema.   Neurological: She is alert.   Skin: Skin is warm. She is not diaphoretic.   Psychiatric: She has a normal mood and affect. Her behavior is normal. Thought content normal.   Nursing note and vitals reviewed.     1/6 systolic ejection murmur appreciated best second right interspace, no carotid bruits          Assessment/Plan:     Assessment  #!Cardiac murmur on auscultation second right interspace, no chest pain, short of breath, cough, lightheaded, or syncope    #2 sleep apnea on cpap    #3 BMI 33    Plan  #1 echo to evaluate heart murmur, asymptomatic with regards to chest pain, short of breath, lightheadedness, syncope    #2 labs    #3 has had influenza vaccine    #4 importance of nutrition, diet, exercise discussed, weight loss(, obesity increases risk for diabetes and heart disease    #5 has mammogram in [place] annually    #6 lifestyle modification discussed

## 2017-11-20 ENCOUNTER — HOSPITAL ENCOUNTER (OUTPATIENT)
Dept: CARDIOLOGY | Facility: MEDICAL CENTER | Age: 62
End: 2017-11-20
Attending: INTERNAL MEDICINE
Payer: COMMERCIAL

## 2017-11-20 ENCOUNTER — TELEPHONE (OUTPATIENT)
Dept: MEDICAL GROUP | Facility: MEDICAL CENTER | Age: 62
End: 2017-11-20

## 2017-11-20 DIAGNOSIS — R01.1 HEART MURMUR: ICD-10-CM

## 2017-11-20 LAB
LV EJECT FRACT  99904: 70
LV EJECT FRACT MOD 2C 99903: 72.59
LV EJECT FRACT MOD 4C 99902: 75.24
LV EJECT FRACT MOD BP 99901: 73.48

## 2017-11-20 PROCEDURE — 93306 TTE W/DOPPLER COMPLETE: CPT

## 2017-11-21 ENCOUNTER — TELEPHONE (OUTPATIENT)
Dept: MEDICAL GROUP | Facility: MEDICAL CENTER | Age: 62
End: 2017-11-21

## 2017-11-21 NOTE — TELEPHONE ENCOUNTER
Please notify patient that her heart ultrasound shows normal heart muscle function and heart valves, no significant abnormalities at all

## 2017-11-21 NOTE — TELEPHONE ENCOUNTER
----- Message from Kenneth Reinoso M.D. sent at 11/20/2017  7:49 PM PST -----  Please notify patient that her heart ultrasound shows normal heart muscle function and heart valves, no significant abnormalities at all

## 2017-12-03 ENCOUNTER — TELEPHONE (OUTPATIENT)
Dept: MEDICAL GROUP | Facility: MEDICAL CENTER | Age: 62
End: 2017-12-03

## 2017-12-03 RX ORDER — LEVOTHYROXINE SODIUM 0.12 MG/1
125 TABLET ORAL
Qty: 90 TAB | Refills: 0 | Status: SHIPPED | OUTPATIENT
Start: 2017-12-03 | End: 2018-01-18 | Stop reason: SDUPTHER

## 2017-12-03 NOTE — TELEPHONE ENCOUNTER
Please notify patient, she is due for her follow-up blood test to be done fasting, the orders are in the computer system

## 2017-12-05 NOTE — TELEPHONE ENCOUNTER
Pt states that she informed you that she was going to do that later in DEC when she does her labs for Dr Blanchard.

## 2017-12-29 ENCOUNTER — HOSPITAL ENCOUNTER (OUTPATIENT)
Dept: LAB | Facility: MEDICAL CENTER | Age: 62
End: 2017-12-29
Attending: INTERNAL MEDICINE
Payer: COMMERCIAL

## 2017-12-29 DIAGNOSIS — Z00.00 PREVENTATIVE HEALTH CARE: Chronic | ICD-10-CM

## 2017-12-29 LAB
ALBUMIN SERPL BCP-MCNC: 4.3 G/DL (ref 3.2–4.9)
ALBUMIN SERPL BCP-MCNC: 4.3 G/DL (ref 3.2–4.9)
ALBUMIN/GLOB SERPL: 1.3 G/DL
ALBUMIN/GLOB SERPL: 1.4 G/DL
ALP SERPL-CCNC: 56 U/L (ref 30–99)
ALP SERPL-CCNC: 57 U/L (ref 30–99)
ALT SERPL-CCNC: 14 U/L (ref 2–50)
ALT SERPL-CCNC: 14 U/L (ref 2–50)
ANION GAP SERPL CALC-SCNC: 7 MMOL/L (ref 0–11.9)
ANION GAP SERPL CALC-SCNC: 7 MMOL/L (ref 0–11.9)
AST SERPL-CCNC: 19 U/L (ref 12–45)
AST SERPL-CCNC: 20 U/L (ref 12–45)
BASOPHILS # BLD AUTO: 0.5 % (ref 0–1.8)
BASOPHILS # BLD AUTO: 0.7 % (ref 0–1.8)
BASOPHILS # BLD: 0.02 K/UL (ref 0–0.12)
BASOPHILS # BLD: 0.03 K/UL (ref 0–0.12)
BILIRUB SERPL-MCNC: 0.6 MG/DL (ref 0.1–1.5)
BILIRUB SERPL-MCNC: 0.6 MG/DL (ref 0.1–1.5)
BUN SERPL-MCNC: 17 MG/DL (ref 8–22)
BUN SERPL-MCNC: 17 MG/DL (ref 8–22)
CALCIUM SERPL-MCNC: 9.5 MG/DL (ref 8.5–10.5)
CALCIUM SERPL-MCNC: 9.5 MG/DL (ref 8.5–10.5)
CHLORIDE SERPL-SCNC: 103 MMOL/L (ref 96–112)
CHLORIDE SERPL-SCNC: 103 MMOL/L (ref 96–112)
CHOLEST SERPL-MCNC: 208 MG/DL (ref 100–199)
CO2 SERPL-SCNC: 26 MMOL/L (ref 20–33)
CO2 SERPL-SCNC: 27 MMOL/L (ref 20–33)
CREAT SERPL-MCNC: 0.99 MG/DL (ref 0.5–1.4)
CREAT SERPL-MCNC: 1 MG/DL (ref 0.5–1.4)
EOSINOPHIL # BLD AUTO: 0.18 K/UL (ref 0–0.51)
EOSINOPHIL # BLD AUTO: 0.18 K/UL (ref 0–0.51)
EOSINOPHIL NFR BLD: 4.2 % (ref 0–6.9)
EOSINOPHIL NFR BLD: 4.2 % (ref 0–6.9)
ERYTHROCYTE [DISTWIDTH] IN BLOOD BY AUTOMATED COUNT: 44.3 FL (ref 35.9–50)
ERYTHROCYTE [DISTWIDTH] IN BLOOD BY AUTOMATED COUNT: 44.4 FL (ref 35.9–50)
GFR SERPL CREATININE-BSD FRML MDRD: 56 ML/MIN/1.73 M 2
GFR SERPL CREATININE-BSD FRML MDRD: 57 ML/MIN/1.73 M 2
GLOBULIN SER CALC-MCNC: 3.1 G/DL (ref 1.9–3.5)
GLOBULIN SER CALC-MCNC: 3.2 G/DL (ref 1.9–3.5)
GLUCOSE SERPL-MCNC: 90 MG/DL (ref 65–99)
GLUCOSE SERPL-MCNC: 92 MG/DL (ref 65–99)
HCT VFR BLD AUTO: 41.8 % (ref 37–47)
HCT VFR BLD AUTO: 42.2 % (ref 37–47)
HDLC SERPL-MCNC: 63 MG/DL
HGB BLD-MCNC: 13.7 G/DL (ref 12–16)
HGB BLD-MCNC: 13.7 G/DL (ref 12–16)
IMM GRANULOCYTES # BLD AUTO: 0.01 K/UL (ref 0–0.11)
IMM GRANULOCYTES # BLD AUTO: 0.01 K/UL (ref 0–0.11)
IMM GRANULOCYTES NFR BLD AUTO: 0.2 % (ref 0–0.9)
IMM GRANULOCYTES NFR BLD AUTO: 0.2 % (ref 0–0.9)
LDH SERPL L TO P-CCNC: 184 U/L (ref 107–266)
LDLC SERPL CALC-MCNC: 131 MG/DL
LYMPHOCYTES # BLD AUTO: 0.8 K/UL (ref 1–4.8)
LYMPHOCYTES # BLD AUTO: 0.86 K/UL (ref 1–4.8)
LYMPHOCYTES NFR BLD: 18.7 % (ref 22–41)
LYMPHOCYTES NFR BLD: 20.3 % (ref 22–41)
MCH RBC QN AUTO: 30.3 PG (ref 27–33)
MCH RBC QN AUTO: 30.6 PG (ref 27–33)
MCHC RBC AUTO-ENTMCNC: 32.5 G/DL (ref 33.6–35)
MCHC RBC AUTO-ENTMCNC: 32.8 G/DL (ref 33.6–35)
MCV RBC AUTO: 93.4 FL (ref 81.4–97.8)
MCV RBC AUTO: 93.5 FL (ref 81.4–97.8)
MONOCYTES # BLD AUTO: 0.57 K/UL (ref 0–0.85)
MONOCYTES # BLD AUTO: 0.67 K/UL (ref 0–0.85)
MONOCYTES NFR BLD AUTO: 13.4 % (ref 0–13.4)
MONOCYTES NFR BLD AUTO: 15.7 % (ref 0–13.4)
NEUTROPHILS # BLD AUTO: 2.59 K/UL (ref 2–7.15)
NEUTROPHILS # BLD AUTO: 2.59 K/UL (ref 2–7.15)
NEUTROPHILS NFR BLD: 60.7 % (ref 44–72)
NEUTROPHILS NFR BLD: 61.2 % (ref 44–72)
NRBC # BLD AUTO: 0 K/UL
NRBC # BLD AUTO: 0 K/UL
NRBC BLD-RTO: 0 /100 WBC
NRBC BLD-RTO: 0 /100 WBC
PLATELET # BLD AUTO: 121 K/UL (ref 164–446)
PLATELET # BLD AUTO: 129 K/UL (ref 164–446)
PMV BLD AUTO: 10.7 FL (ref 9–12.9)
PMV BLD AUTO: 10.8 FL (ref 9–12.9)
POTASSIUM SERPL-SCNC: 3.8 MMOL/L (ref 3.6–5.5)
POTASSIUM SERPL-SCNC: 3.8 MMOL/L (ref 3.6–5.5)
PROT SERPL-MCNC: 7.4 G/DL (ref 6–8.2)
PROT SERPL-MCNC: 7.5 G/DL (ref 6–8.2)
RBC # BLD AUTO: 4.47 M/UL (ref 4.2–5.4)
RBC # BLD AUTO: 4.52 M/UL (ref 4.2–5.4)
SODIUM SERPL-SCNC: 136 MMOL/L (ref 135–145)
SODIUM SERPL-SCNC: 137 MMOL/L (ref 135–145)
TRIGL SERPL-MCNC: 70 MG/DL (ref 0–149)
WBC # BLD AUTO: 4.2 K/UL (ref 4.8–10.8)
WBC # BLD AUTO: 4.3 K/UL (ref 4.8–10.8)

## 2017-12-29 PROCEDURE — 80053 COMPREHEN METABOLIC PANEL: CPT | Mod: 91

## 2017-12-29 PROCEDURE — 82232 ASSAY OF BETA-2 PROTEIN: CPT

## 2017-12-29 PROCEDURE — 36415 COLL VENOUS BLD VENIPUNCTURE: CPT

## 2017-12-29 PROCEDURE — 85025 COMPLETE CBC W/AUTO DIFF WBC: CPT

## 2017-12-29 PROCEDURE — 82306 VITAMIN D 25 HYDROXY: CPT

## 2017-12-29 PROCEDURE — 84443 ASSAY THYROID STIM HORMONE: CPT

## 2017-12-29 PROCEDURE — 83615 LACTATE (LD) (LDH) ENZYME: CPT

## 2017-12-29 PROCEDURE — 85025 COMPLETE CBC W/AUTO DIFF WBC: CPT | Mod: 91

## 2017-12-29 PROCEDURE — 80053 COMPREHEN METABOLIC PANEL: CPT

## 2017-12-29 PROCEDURE — 80061 LIPID PANEL: CPT

## 2017-12-30 LAB
25(OH)D3 SERPL-MCNC: 30 NG/ML (ref 30–100)
B2 MICROGLOB SERPL-MCNC: 3.6 MG/L (ref 1.1–2.4)
TSH SERPL DL<=0.005 MIU/L-ACNC: 2.65 UIU/ML (ref 0.38–5.33)

## 2017-12-31 ENCOUNTER — TELEPHONE (OUTPATIENT)
Dept: MEDICAL GROUP | Facility: MEDICAL CENTER | Age: 62
End: 2017-12-31

## 2017-12-31 PROBLEM — R94.4 DECREASED GFR: Status: ACTIVE | Noted: 2017-12-31

## 2017-12-31 NOTE — TELEPHONE ENCOUNTER
Please notify patient her blood tests show  (1) normal thyroid, no change in thyroid medication  (2) cholesterol is improved total 208, good cholesterol is 63 (goal is above 40), bad cholesterol is 131 decreased from 152 (goal is 100 or less) no medications are necessary  (3) liver and kidney function are stable, blood counts are stable  (4) vitamin D is slightly low, vitamin D is important for good bone strength, have her take an extra 2000 units of vitamin D daily

## 2018-01-02 ENCOUNTER — TELEPHONE (OUTPATIENT)
Dept: MEDICAL GROUP | Facility: MEDICAL CENTER | Age: 63
End: 2018-01-02

## 2018-01-02 NOTE — TELEPHONE ENCOUNTER
----- Message from Kenneth Reinoso M.D. sent at 12/31/2017  2:49 AM PST -----  Please notify patient her blood tests show  (1) normal thyroid, no change in thyroid medication  (2) cholesterol is improved total 208, good cholesterol is 63 (goal is above 40), bad cholesterol is 131 decreased from 152 (goal is 100 or less) no medications are necessary  (3) liver and kidney function are stable, blood counts are stable  (4) vitamin D is slightly low, vitamin D is important for good bone strength, have her take an extra 2000 units of vitamin D daily

## 2018-01-18 ENCOUNTER — OFFICE VISIT (OUTPATIENT)
Dept: MEDICAL GROUP | Facility: MEDICAL CENTER | Age: 63
End: 2018-01-18
Payer: COMMERCIAL

## 2018-01-18 VITALS
BODY MASS INDEX: 33.31 KG/M2 | SYSTOLIC BLOOD PRESSURE: 114 MMHG | HEIGHT: 63 IN | TEMPERATURE: 98.4 F | HEART RATE: 72 BPM | DIASTOLIC BLOOD PRESSURE: 86 MMHG | WEIGHT: 188 LBS | OXYGEN SATURATION: 97 %

## 2018-01-18 DIAGNOSIS — J40 BRONCHITIS: ICD-10-CM

## 2018-01-18 DIAGNOSIS — E78.5 DYSLIPIDEMIA: Chronic | ICD-10-CM

## 2018-01-18 PROCEDURE — 99213 OFFICE O/P EST LOW 20 MIN: CPT | Performed by: INTERNAL MEDICINE

## 2018-01-18 RX ORDER — AZITHROMYCIN 250 MG/1
TABLET, FILM COATED ORAL
Qty: 6 TAB | Refills: 0 | Status: SHIPPED | OUTPATIENT
Start: 2018-01-18 | End: 2018-12-20

## 2018-01-18 RX ORDER — LEVOTHYROXINE SODIUM 0.12 MG/1
125 TABLET ORAL
Qty: 90 TAB | Refills: 2 | Status: SHIPPED | OUTPATIENT
Start: 2018-01-18 | End: 2018-11-01 | Stop reason: SDUPTHER

## 2018-01-18 ASSESSMENT — PATIENT HEALTH QUESTIONNAIRE - PHQ9: CLINICAL INTERPRETATION OF PHQ2 SCORE: 0

## 2018-01-18 NOTE — PROGRESS NOTES
Subjective:      Mari Kinsey is a 62 y.o. female who presents with Cough            HPI   New complaint  Patient states that around [place] she got sick ,  had been sick previously, had more sinus congestion, sneezing, runny nose, some body aches, no chest symptoms such as chest congestion, cough, chest pain, no nausea, vomiting, diarrhea, does not recall high fevers at the time although some chills.  States she improved, did not get seen for symptoms.  As she improved, her symptoms subsided but did not fully thom.  Subsequently went to [facility] 1/10/18 and returned to [place] 1/13/18 then developed up on return cough with sputum yellow and grey, no blood in sputum, no fevers or chills, no nausea or emesis, no diarrhea, cough worse with laying down, some post nasal drip, no headache, no muscle pain or muscle aches.  No known sick exposures during her trip  No history of asthma or COPD  No tobacco  Exercise does fast walking [id number] steps per day normally  Hypothyroid on replacement Synthroid 125 µg        Current Outpatient Prescriptions   Medication Sig Dispense Refill   • levothyroxine (SYNTHROID) 125 MCG Tab Take 1 Tab by mouth Every morning on an empty stomach. 90 Tab 0     No current facility-administered medications for this visit.      Patient Active Problem List   Diagnosis   • Hypothyroid   • Preventative health care   • Bunion of great toe of left foot   • Deviated nasal septum   • Dyslipidemia   • Sleep apnea   • History of allergic rhinitis   • Skin lesion   • Family history of breast cancer in sister   • Macular degeneration   • Obesity   • Microscopic hematuria   • Lymphoma (HCC)   • Decreased GFR     Depression Screening    Little interest or pleasure in doing things?  0 - not at all  Feeling down, depressed , or hopeless? 0 - not at all  Patient Health Questionnaire Score: 0          Health Maintenance Summary                MAMMOGRAM Overdue 11/25/2017      Done 11/25/2016  "[id number]     Patient has more history with this topic...    IMM PNEUMOCOCCAL 19-64 (ADULT) HIGHEST RISK SERIES Overdue 12/22/2017      Done 12/22/2016 Imm Admin: Pneumococcal polysaccharide vaccine (PPSV-23)    PAP SMEAR Next Due 2/23/2020      Done 2/23/2017      Patient has more history with this topic...    COLONOSCOPY Next Due 11/17/2020      Done 11/17/2010 AMB REFERRAL TO GI FOR COLONOSCOPY    IMM DTaP/Tdap/Td Vaccine Next Due 12/9/2021      Done 12/9/2011 Imm Admin: Tdap Vaccine          Patient Care Team:  Kenneth Reinoso M.D. as PCP - General  [employer] as Respiratory (DME Supplier)      ROS       Objective:     /86   Pulse 72   Temp 36.9 °C (98.4 °F)   Ht 1.6 m (5' 3\")   Wt 85.3 kg (188 lb)   SpO2 97%   BMI 33.30 kg/m²      Physical Exam   Constitutional: She appears well-developed and well-nourished. No distress.   HENT:   Head: Normocephalic and atraumatic.   Right Ear: External ear normal.   Left Ear: External ear normal.   Mouth/Throat: Oropharynx is clear and moist. No oropharyngeal exudate.   Eyes: Conjunctivae are normal. Right eye exhibits no discharge. Left eye exhibits no discharge.   Neck: No JVD present. No thyromegaly present.   Cardiovascular: Normal rate and regular rhythm.    No murmur heard.  Pulmonary/Chest: Effort normal and breath sounds normal. No respiratory distress. She has no wheezes.   Abdominal: She exhibits no distension.   Musculoskeletal: She exhibits no edema.   Skin: Skin is warm. She is not diaphoretic.   Psychiatric: She has a normal mood and affect.   Nursing note and vitals reviewed.    No sinus tenderness          Assessment/Plan:   Assessment  #!  Recurrent bronchitis symptoms after initial upper respiratory tract infection 3 weeks ago, symptoms appear to have improved however recurred after recent trip, more chest congestion with this current episode, nonsmoker, no history of asthma or COPD, afebrile, good oxygenation, breath sounds are clear " on auscultation to lungs, likely bronchitis , no evidence of influenza, sinusitis, pharyngitis     #2 hypothyroid on replacement  most recent TSH 2.6 on December 29      Plan  #1 Zithromax ×5 days has had before, can cause nausea, vomiting, diarrhea, rash    #2 refill levothyroxine 125 µg daily to 30 Ms. before meals    #3 has had influenza vaccine    #4 no need for imaging at this time    #5 notify us if no changes or worsening symptoms

## 2018-01-29 ENCOUNTER — TELEPHONE (OUTPATIENT)
Dept: PULMONOLOGY | Facility: HOSPICE | Age: 63
End: 2018-01-29

## 2018-01-29 DIAGNOSIS — G47.33 OBSTRUCTIVE SLEEP APNEA: ICD-10-CM

## 2018-02-23 ENCOUNTER — OFFICE VISIT (OUTPATIENT)
Dept: URGENT CARE | Facility: CLINIC | Age: 63
End: 2018-02-23
Payer: COMMERCIAL

## 2018-02-23 VITALS
RESPIRATION RATE: 16 BRPM | DIASTOLIC BLOOD PRESSURE: 70 MMHG | BODY MASS INDEX: 32.67 KG/M2 | WEIGHT: 184.4 LBS | SYSTOLIC BLOOD PRESSURE: 130 MMHG | TEMPERATURE: 99.2 F | HEIGHT: 63 IN | OXYGEN SATURATION: 97 % | HEART RATE: 84 BPM

## 2018-02-23 DIAGNOSIS — H10.33 ACUTE BACTERIAL CONJUNCTIVITIS OF BOTH EYES: ICD-10-CM

## 2018-02-23 PROCEDURE — 99214 OFFICE O/P EST MOD 30 MIN: CPT | Performed by: PHYSICIAN ASSISTANT

## 2018-02-23 RX ORDER — MOXIFLOXACIN 5 MG/ML
1 SOLUTION/ DROPS OPHTHALMIC 3 TIMES DAILY
Qty: 1 BOTTLE | Refills: 0 | Status: SHIPPED | OUTPATIENT
Start: 2018-02-23 | End: 2018-03-02

## 2018-02-23 ASSESSMENT — ENCOUNTER SYMPTOMS
BLURRED VISION: 0
SORE THROAT: 0
CHILLS: 0
EYE PAIN: 1
FEVER: 0
DOUBLE VISION: 0
EYE DISCHARGE: 1
COUGH: 0
PALPITATIONS: 0
HEADACHES: 0
PHOTOPHOBIA: 0
SHORTNESS OF BREATH: 0
EYE REDNESS: 1

## 2018-02-24 NOTE — PROGRESS NOTES
Subjective:      Mari Kinsey is a 62 y.o. female who presents with Conjunctivitis (painful, itchy and crusted over in the am with yellow discharge )            Conjunctivitis   This is a new problem. The current episode started yesterday. The problem occurs constantly. Pertinent negatives include no chest pain, chills, congestion, coughing, fever, headaches, rash or sore throat. Nothing aggravates the symptoms. She has tried nothing for the symptoms.       Review of Systems   Constitutional: Negative for chills, fever and malaise/fatigue.   HENT: Negative for congestion, ear pain and sore throat.    Eyes: Positive for pain, discharge and redness. Negative for blurred vision, double vision and photophobia.   Respiratory: Negative for cough and shortness of breath.    Cardiovascular: Negative for chest pain and palpitations.   Skin: Negative for rash.   Neurological: Negative for headaches.   All other systems reviewed and are negative.    PMH:  has a past medical history of Chickenpox; Dental disorder; Turkish measles; Hypothyroidism; Influenza; Mumps; ALLEN (obstructive sleep apnea); Sleep apnea; Snoring; Thyroid disease; and Tonsillitis.  MEDS:   Current Outpatient Prescriptions:   •  moxifloxacin (VIGAMOX) 0.5 % Solution, Place 1 Drop in both eyes 3 times a day for 7 days., Disp: 1 Bottle, Rfl: 0  •  levothyroxine (SYNTHROID) 125 MCG Tab, Take 1 Tab by mouth Every morning on an empty stomach., Disp: 90 Tab, Rfl: 2  •  azithromycin (ZITHROMAX Z-NORAH) 250 MG Tab, 2 tabs by mouth day 1, 1 tab by mouth days 2-5, Disp: 6 Tab, Rfl: 0  ALLERGIES:   Allergies   Allergen Reactions   • Penicillins Rash     SURGHX:   Past Surgical History:   Procedure Laterality Date   • KALE BY LAPAROSCOPY  2/3/2016    Procedure: KALE BY LAPAROSCOPY;  Surgeon: Tricia Farah M.D.;  Location: SURGERY SAME DAY [facility];  Service:    • LYMPH NODE SAMPLING  2/3/2016    Procedure: LYMPH NODE SAMPLING EXCISION, BIOPSY;  Surgeon:  "Tricia Farah M.D.;  Location: SURGERY SAME DAY [facility];  Service:    • BONE GRAFT      at 19 years of age   • BUNIONECTOMY     • HYSTEROSCOPY WITH VIDEO DIAGNOSTIC     • NASAL SEPTAL RECONSTRUCTION     • OTHER      bone graft to ;hand   • TONSILLECTOMY       SOCHX:  reports that she is a non-smoker but has been exposed to tobacco smoke. She has never used smokeless tobacco. She reports that she does not drink alcohol or use drugs.  FH: Family history was reviewed, no pertinent findings to report  Medications, Allergies, and current problem list reviewed today in Epic       Objective:     /70   Pulse 84   Temp 37.3 °C (99.2 °F)   Resp 16   Ht 1.6 m (5' 3\")   Wt 83.6 kg (184 lb 6.4 oz)   SpO2 97%   Breastfeeding? No   BMI 32.66 kg/m²      Physical Exam   Constitutional: She is oriented to person, place, and time. She appears well-developed and well-nourished. She is active.  Non-toxic appearance. She does not have a sickly appearance. She does not appear ill. No distress.   HENT:   Head: Normocephalic and atraumatic.   Right Ear: Hearing, tympanic membrane, external ear and ear canal normal.   Left Ear: Hearing, tympanic membrane, external ear and ear canal normal.   Nose: Nose normal.   Mouth/Throat: Uvula is midline, oropharynx is clear and moist and mucous membranes are normal. No oropharyngeal exudate.   Eyes: EOM and lids are normal. Pupils are equal, round, and reactive to light. Lids are everted and swept, no foreign bodies found. Right conjunctiva is injected. Left conjunctiva is injected.   Neck: Normal range of motion and full passive range of motion without pain. Neck supple.   Cardiovascular: Normal rate, regular rhythm and normal heart sounds.  Exam reveals no gallop and no friction rub.    No murmur heard.  Pulmonary/Chest: Effort normal and breath sounds normal. No respiratory distress. She has no decreased breath sounds. She has no wheezes. She has no rales. She exhibits no " tenderness.   Musculoskeletal: Normal range of motion. She exhibits no edema, tenderness or deformity.   Neurological: She is alert and oriented to person, place, and time.   Skin: Skin is warm, dry and intact.   Psychiatric: She has a normal mood and affect. Her speech is normal and behavior is normal. Judgment and thought content normal.   Vitals reviewed.              Assessment/Plan:     1. Acute bacterial conjunctivitis of both eyes    - moxifloxacin (VIGAMOX) 0.5 % Solution; Place 1 Drop in both eyes 3 times a day for 7 days.  Dispense: 1 Bottle; Refill: 0    Differential diagnosis, natural history, supportive care discussed. Follow-up with primary care provider within 7-10 days, emergency room precautions discussed.  Patient and/or family appears understanding of information.

## 2018-10-11 ENCOUNTER — NON-PROVIDER VISIT (OUTPATIENT)
Dept: MEDICAL GROUP | Facility: MEDICAL CENTER | Age: 63
End: 2018-10-11
Payer: COMMERCIAL

## 2018-10-11 DIAGNOSIS — Z23 NEED FOR VACCINATION: ICD-10-CM

## 2018-10-11 PROCEDURE — 90686 IIV4 VACC NO PRSV 0.5 ML IM: CPT | Performed by: INTERNAL MEDICINE

## 2018-10-11 PROCEDURE — 90471 IMMUNIZATION ADMIN: CPT | Performed by: INTERNAL MEDICINE

## 2018-10-11 NOTE — PROGRESS NOTES
"Mari Kinsey is a 62 y.o. female here for a non-provider visit for:   FLU    Reason for immunization: Annual Flu Vaccine  Immunization records indicate need for vaccine: Yes, confirmed with Epic  Minimum interval has been met for this vaccine: Yes  ABN completed: Not Indicated    Order and dose verified by: KERRY CASAS Dated  08/07/2015 was given to patient: Yes  All IAC Questionnaire questions were answered \"No.\"    Patient tolerated injection and no adverse effects were observed or reported: Yes    Pt scheduled for next dose in series: No  "

## 2018-10-19 ENCOUNTER — SLEEP CENTER VISIT (OUTPATIENT)
Dept: SLEEP MEDICINE | Facility: MEDICAL CENTER | Age: 63
End: 2018-10-19
Payer: COMMERCIAL

## 2018-10-19 VITALS
SYSTOLIC BLOOD PRESSURE: 118 MMHG | RESPIRATION RATE: 15 BRPM | OXYGEN SATURATION: 97 % | HEART RATE: 72 BPM | DIASTOLIC BLOOD PRESSURE: 70 MMHG | BODY MASS INDEX: 35.61 KG/M2 | WEIGHT: 201 LBS | HEIGHT: 63 IN

## 2018-10-19 DIAGNOSIS — G47.33 OBSTRUCTIVE SLEEP APNEA SYNDROME: Chronic | ICD-10-CM

## 2018-10-19 PROCEDURE — 99213 OFFICE O/P EST LOW 20 MIN: CPT | Performed by: FAMILY MEDICINE

## 2018-10-19 NOTE — PROGRESS NOTES
[facility] Follow Up Note     Date: 10/19/2018 / Time: 2:40 PM    Patient ID:   Name:             Mari Kinsey   YOB: 1955  Age:                 62 y.o.  female   MRN:               [id number]      Thank you for requesting a sleep medicine consultation on Mari Kinsey at the sleep center. She presents today with the chief complaints of ALLEN follow up.     HISTORY OF PRESENT ILLNESS:       Pt is currently on CPAP 9 cm. She goes to sleep around 9:30-10 pm and wakes up around 6-7 am. She is getting about 7.5 hrs of sleep on a good night and about 6 hr of sleep on a bad night. The bad nights are about 1 per week. Usually its due to trouble maintaining sleep. She is using CPAP most days of the week. Pt reports 7 hrs of average nightly use of CPAP. Pt denies snoring, gasping,choking.Pt also denies significant mask leak that is interfering with sleep.      The 30 day compliance was downloaded which shows adequate compliance with more that 4 hr usage about 90%. The AHI is has improved to 0.8/hr. The mask leak is normal. The symptoms of excessive daytime, snoring and gasping has improved.         SLEEP HISTORY    PSG from 1/2011 indicated an AHI of 28.3 and low oxygen of 83%.       REVIEW OF SYSTEMS:       Constitutional: Denies fevers, Denies weight changes  Eyes: Denies changes in vision, no eye pain  Ears/Nose/Throat/Mouth: Denies nasal congestion or sore throat   Cardiovascular: Denies chest pain or palpitations   Respiratory: Denies shortness of breath , Denies cough  Gastrointestinal/Hepatic: Denies abdominal pain, nausea, vomiting, diarrhea, constipation or GI bleeding   Genitourinary: Denies bladder dysfunction, dysuria or frequency  Musculoskeletal/Rheum: Denies  joint pain and swelling   Skin/Breast: Denies rash,   Neurological: Denies headache, confusion, memory loss or focal weakness/parasthesias  Psychiatric: denies mood disorder     Comprehensive review of systems form  "is reviewed with the patient and is attached in the EMR.     PMH:  has a past medical history of Chickenpox; Dental disorder; Belarusian measles; Hypothyroidism; Influenza; Mumps; ALLEN (obstructive sleep apnea); Sleep apnea; Snoring; Thyroid disease; and Tonsillitis.  MEDS:   Current Outpatient Prescriptions:   •  levothyroxine (SYNTHROID) 125 MCG Tab, Take 1 Tab by mouth Every morning on an empty stomach., Disp: 90 Tab, Rfl: 2  •  azithromycin (ZITHROMAX Z-NORAH) 250 MG Tab, 2 tabs by mouth day 1, 1 tab by mouth days 2-5 (Patient not taking: Reported on 10/19/2018), Disp: 6 Tab, Rfl: 0  ALLERGIES:   Allergies   Allergen Reactions   • Penicillins Rash     SURGHX:   Past Surgical History:   Procedure Laterality Date   • KALE BY LAPAROSCOPY  2/3/2016    Procedure: KALE BY LAPAROSCOPY;  Surgeon: Tricia Farah M.D.;  Location: SURGERY SAME DAY [facility];  Service:    • LYMPH NODE SAMPLING  2/3/2016    Procedure: LYMPH NODE SAMPLING EXCISION, BIOPSY;  Surgeon: Tricia Farah M.D.;  Location: SURGERY SAME DAY [facility];  Service:    • BONE GRAFT      at 19 years of age   • BUNIONECTOMY     • HYSTEROSCOPY WITH VIDEO DIAGNOSTIC     • NASAL SEPTAL RECONSTRUCTION     • OTHER      bone graft to ;hand   • TONSILLECTOMY       SOCHX:  reports that she is a non-smoker but has been exposed to tobacco smoke. She has never used smokeless tobacco. She reports that she does not drink alcohol or use drugs..  FH:   Family History   Problem Relation Age of Onset   • Heart Disease Father    • Arthritis Mother         osteoporosis   • Sleep Apnea Neg Hx          Physical Exam:  Vitals/ General Appearance:   Weight/BMI: Body mass index is 35.61 kg/m².  Blood pressure 118/70, pulse 72, resp. rate 15, height 1.6 m (5' 3\"), weight 91.2 kg (201 lb), SpO2 97 %, not currently breastfeeding.  Vitals:    10/19/18 1423   BP: 118/70   BP Location: Right arm   Patient Position: Sitting   BP Cuff Size: Adult   Pulse: 72   Resp: 15   SpO2: 97% " "  Weight: 91.2 kg (201 lb)   Height: 1.6 m (5' 3\")       Pt. is alert and oriented to time, place and person. Cooperative and in no apparent distress.       1. Head: Atraumatic, normocephalic.   2. Ears: Normal tympanic membrane and no discharge  3. Nose: No inferior turbinate hypertophy, no septal deviation, no polyp.   4. Throat: Oropharynx appears crowded in that the palate is overhanging   5. Neck: Supple. No thyromegaly  6. Chest: Trachea central, no spine deformity   7. Lungs auscultation: B/L good air entry, vesicular breath sounds, no adventitious sounds  8. Heart auscultation: 1st and 2nd heart sounds normal, regular rhythm. No appreciable murmur.  9.  Extremities: no pedal edema.  10. Skin: No rash  11. NEUROLOGICAL EXAMINATION: On neurological exam, the patient was alert and oriented x3. speech was clear and fluent without dysarthria.      INVESTIGATIONS:           ASSESSMENT AND PLAN     1. Sleep Apnea (ALLEN).    The pathophysiology of sleep anea and the increased risk of cardiovascular morbidity from untreated sleep apnea is discussed in detail with the patient.     She is urged to avoid supine sleep, weight gain and alcoholic beverages since all of these can worsen sleep apnea. She is cautioned against drowsy driving. If She feels sleepy while driving, She must pull over for a break/nap, rather than persist on the road, in the interest of She own safety and that of others on the road.   Plan   - Continue CPAP at 9 cm with FFM   - supplies refilled   - compliance download was reviewed and discussed with the pt   - compliance was reinforced     2.Regarding treatment of other past medical problems and general health maintenance,  She is urged to follow up with PCP.          "

## 2018-12-06 ENCOUNTER — APPOINTMENT (RX ONLY)
Dept: URBAN - METROPOLITAN AREA CLINIC 35 | Facility: CLINIC | Age: 63
Setting detail: DERMATOLOGY
End: 2018-12-06

## 2018-12-06 DIAGNOSIS — Z87.2 PERSONAL HISTORY OF DISEASES OF THE SKIN AND SUBCUTANEOUS TISSUE: ICD-10-CM

## 2018-12-06 DIAGNOSIS — D18.0 HEMANGIOMA: ICD-10-CM

## 2018-12-06 DIAGNOSIS — L81.4 OTHER MELANIN HYPERPIGMENTATION: ICD-10-CM

## 2018-12-06 DIAGNOSIS — D22 MELANOCYTIC NEVI: ICD-10-CM

## 2018-12-06 DIAGNOSIS — L82.1 OTHER SEBORRHEIC KERATOSIS: ICD-10-CM

## 2018-12-06 DIAGNOSIS — Z71.89 OTHER SPECIFIED COUNSELING: ICD-10-CM

## 2018-12-06 PROBLEM — D22.61 MELANOCYTIC NEVI OF RIGHT UPPER LIMB, INCLUDING SHOULDER: Status: ACTIVE | Noted: 2018-12-06

## 2018-12-06 PROBLEM — D18.01 HEMANGIOMA OF SKIN AND SUBCUTANEOUS TISSUE: Status: ACTIVE | Noted: 2018-12-06

## 2018-12-06 PROCEDURE — ? COUNSELING

## 2018-12-06 PROCEDURE — ? OBSERVATION

## 2018-12-06 PROCEDURE — 99213 OFFICE O/P EST LOW 20 MIN: CPT

## 2018-12-06 ASSESSMENT — LOCATION SIMPLE DESCRIPTION DERM
LOCATION SIMPLE: RIGHT SHOULDER
LOCATION SIMPLE: RIGHT POSTERIOR UPPER ARM
LOCATION SIMPLE: RIGHT UPPER BACK
LOCATION SIMPLE: RIGHT PRETIBIAL REGION

## 2018-12-06 ASSESSMENT — LOCATION DETAILED DESCRIPTION DERM
LOCATION DETAILED: RIGHT POSTERIOR SHOULDER
LOCATION DETAILED: RIGHT PROXIMAL POSTERIOR UPPER ARM
LOCATION DETAILED: RIGHT SUPERIOR UPPER BACK
LOCATION DETAILED: RIGHT DISTAL PRETIBIAL REGION

## 2018-12-06 ASSESSMENT — LOCATION ZONE DERM
LOCATION ZONE: TRUNK
LOCATION ZONE: ARM
LOCATION ZONE: LEG

## 2018-12-06 NOTE — HPI: SKIN LESION
Is This A New Presentation, Or A Follow-Up?: Follow Up Skin Lesion
What Type Of Note Output Would You Prefer (Optional)?: Standard Output
How Severe Is Your Skin Lesion?: mild
Has Your Skin Lesion Been Treated?: not been treated

## 2018-12-20 ENCOUNTER — OFFICE VISIT (OUTPATIENT)
Dept: MEDICAL GROUP | Facility: MEDICAL CENTER | Age: 63
End: 2018-12-20
Payer: COMMERCIAL

## 2018-12-20 VITALS
WEIGHT: 203 LBS | BODY MASS INDEX: 35.97 KG/M2 | HEIGHT: 63 IN | TEMPERATURE: 98.4 F | SYSTOLIC BLOOD PRESSURE: 118 MMHG | HEART RATE: 69 BPM | DIASTOLIC BLOOD PRESSURE: 80 MMHG | OXYGEN SATURATION: 91 %

## 2018-12-20 DIAGNOSIS — H35.30 MACULAR DEGENERATION, UNSPECIFIED LATERALITY, UNSPECIFIED TYPE: ICD-10-CM

## 2018-12-20 DIAGNOSIS — Z12.11 COLON CANCER SCREENING: ICD-10-CM

## 2018-12-20 DIAGNOSIS — Z00.00 PREVENTATIVE HEALTH CARE: Chronic | ICD-10-CM

## 2018-12-20 DIAGNOSIS — E03.8 OTHER SPECIFIED HYPOTHYROIDISM: Chronic | ICD-10-CM

## 2018-12-20 DIAGNOSIS — C85.90 LYMPHOMA, UNSPECIFIED BODY REGION, UNSPECIFIED LYMPHOMA TYPE (HCC): Chronic | ICD-10-CM

## 2018-12-20 DIAGNOSIS — Z23 NEED FOR ZOSTER VACCINE: ICD-10-CM

## 2018-12-20 DIAGNOSIS — Z12.4 CERVICAL CANCER SCREENING: ICD-10-CM

## 2018-12-20 PROCEDURE — 99396 PREV VISIT EST AGE 40-64: CPT | Performed by: INTERNAL MEDICINE

## 2018-12-20 ASSESSMENT — ENCOUNTER SYMPTOMS
HEARTBURN: 0
WEIGHT LOSS: 0
BLURRED VISION: 0
CONSTIPATION: 0
COUGH: 0
PALPITATIONS: 0
SHORTNESS OF BREATH: 0
FEVER: 0
DIARRHEA: 0
CHILLS: 0
DOUBLE VISION: 0
BACK PAIN: 0
INSOMNIA: 0
DEPRESSION: 0
ABDOMINAL PAIN: 0

## 2018-12-20 NOTE — PROGRESS NOTES
Subjective:      Mari Kinsey is a 63 y.o. female who presents with Annual Exam            HPI   Annual exam  Medication, allergies, medical history, surgical history, social history, family history reviewed and updated  Sees  derm uses sunscreen when out  Sees gyn   Sees pulmonary on cpap 9 nightly uses 6 hours   Sees GI  Sees  oncology annually  SH , exercises by walking 4 miles, quit working at [employer] had been walking 92801 steps per day, since then has gained weight. Trying to follow a healthy diet. Has been eating more sweets, drinks tea and water during the day, no soda.   Family history no changes  Hypothyroid on levothyroxine taking daily 1 hour before meals, medications, supplements            Current Outpatient Prescriptions   Medication Sig Dispense Refill   • levothyroxine (SYNTHROID) 125 MCG Tab Take 1 Tab by mouth Every morning on an empty stomach. 90 Tab 0     No current facility-administered medications for this visit.      Sleep apnea  2/11 [facility] sleep study moderate obstructive sleep apnea syndrome with apnea-hypotony index 28, low saturation 83%, successful CPAP titration final pressure 9 cm with improvement in apnea-hypotony index to 1.5 with low saturation 91%  3/11 [facility] note on cpap 9  3/12 [facility] note on cpap 9  12/12 off cpap  12/14 back on cpap 9  12/20/15 on cpap 9  10/10/17sleep note continue cpap 9  11/20/17 echo normal LV size and function, EF 70%, mild tricuspid regurgitation, RVSP 35  10/19/18 sleep note on cpap 9     Skin lesion followed by dermatology Dr. Mondragon     Preventative health  10/17/10 GI colon negative repeat 10 yrs  12/9/11 tdap   9/2/14 zoster vaccine at [facility]  11/25/16 mammogram done [facility] negative  12/22/16 pneumovax  12/29/16 dexa LS -0.7,hip +0.8  2/23/17 pap per  gyn   8/4/17 completed hep b vaccine series   12/29/17 vit d 30 take extra 2000 units     Microscopic hematuria  12/19/14 UA 2-5 RBC repeat in 1  month  1/21/15 UA trace blood   1/27/15 ultrasound renal negative  2/15 drop of urine one more time, send out UA evaluate microscopic hematuria , if still positive for urology  2/27/15 UA positive blood refer to urology  4/4/15 CT abdomen and pelvis with and without; retroperitoneal periaortic pericaval and retrocrural adenopathy, mesenteric adenopathy, no hydronephrosis or urolithiasis  10/16/15 urology note microscopic hematuria workup, CT urogram negative, cystoscopy bladder neck inflammation with cytology negative, hematuria has resolved; followup 6 months  4/13/16 urology [place] note UA and cytology     Macular degeneration     Lymphoma  4/4/15 CT abdomen and pelvis with and without; retroperitoneal periaortic pericaval and retrocrural adenopathy, mesenteric adenopathy, no hydronephrosis or urolithiasis  12/11/15 ,,alk phos 170,bili 1.2   12/20/15 ultrasound liver pending, repeat liver enzymes and secondary workup pending, CT abdomen and pelvis ordered at urgent care pending follow-up lymphadenopathy  12/24/15 ultrasound abdomen and biliary tree, prominent liver size, no focal mass, gallbladder contains numerous mobile stones, no thickening or pericholecystic fluid is noted, maximum diameter common bile duct 9.8 which is enlarged, 2.6 x 2.2 x 2.6 cm nodule hypoechoic area in the pancreatic body not well visualized because of gas  12/24/15 AST 34,ALT 76,GGT 76.alk phos 88,bili 0.5,acute hep panel negative, iron 45,%sat 13,ferritin 67,AMA 22 (20-25 equivocal),F-actin Ab IgG negative,RE neg,SPEP neg  12/26/15 CT with contrast pending follow-up adenopathy on previous CT and possible pancreatic lesion on ultrasound, referral to surgery for cholelithiasis.  1/12/16  surgery scheduled for laparoscopic cholecystectomy, laparoscopic biopsy of lymph nodes, conversion to open procedure to complete biopsy if necessary to rule out lymphoma  2/3/16 laparoscopic mesenteric lymph node biopsy limited  sampling, partial involvement follicular lymphoma B-cell origin favor low-grade 1-2/3, flow cytometry CD10 monoclonal B cells, demonstrating lambda light chain expression and call expression of CD10 with CD19, CD20, negative for CD5 and CD43  2/19/16 CT/PET hypermetabolic activity within retrocrural, periaortic, aortocaval space, proximal pelvic adenopathy within the left common iliac chain, hypermetabolic adenopathy within mesentery  3/16/16  oncology consultation; 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 consistent with low-grade non-hodgkin's lymphoma follicular-type CD10 and CD20 positive, likely low-grade apparently indolent course, most likely will need observation, recommend complete workup including bone marrow biopsy, LDH, CMP,and once complete will calculate FLPI score  4/6/16  oncology note wbc 4.6,hgb 13.9,hct 43,plt 240; stage IIIa follicular lymphoma low-grade, recommend observation follow-up every 3 months first year and then every 6 months, CBC, CMP, LDH, beta-2 microglobulin and imaging studies as indicated, follow up 3 months  7/6/16  oncology note, wbc 7.7,hgb 13.6,hct 40,plt 304, ,beta 2 microglobulin 2.5 (1.1-2.4),uric 5.7;follow up 3 months  10/5/16  oncology note clinically no signs of recurrence, repeat CT chest, abdomen, pelvis in january, CMP, LDH today, follow-up 3 months  12/27/16 wbc 7.7 (49%N,30%L)  12/29/16 CT abdomen and pelvis with; interval resolution of retrocrural, para-aortic, paracaval, and mesenteric adenopathy, no adenopathy appreciated in pelvis, stable 2 cm or less diameter nodules right middle lobe  1/17/17  oncology note, doing well with recent CT showing no evidence of adenopathy, repeat labs prior to next visit CBC, CMP, LDH, beta-2 microglobulin, follow-up 6 months  7/12/17 cbc,cmp normal,,beta 2 microglobulin  7/18/17  oncology note no evidence disease progression  12/29/17  wbc 4.3 (61%N,18%L)  1/16/18  oncology note no evidence of disease progression, recent labs cbc,cmp,ldh normal, follow-up 6 months  7/30/18  oncology note stable no recurrence of disease, labs ordered     Hypothyroid  9/10 tsh 1.9 on levothyroxine 112 mcg  12/9/11 tsh 4.1 on levothyroxine 112 mcg; increase to levothyroxine 125 mcg  2/12 tsh 2.4 cont levothyroxine 125 mcg  1/13 tsh 3.5 on levothyroxine 125 mcg  2/28/14 tsh 3.9 on levothyroxine 125 mcg  12/19/14 tsh 3.8 on levothyroxine 125 mcg  12/24/15 tsh 2.0 on levothyroxine 125 mcg  12/22/16 tsh 3.3 on levothyroxine 125 mcg   12/29/17 tsh 2.6 on levothyroxine 125 mcg     History allergic rhinitis  2/12  allergy note skin testing positive reactions trees, grasses, weeds,cat and dog, molds, dust mite  12/12 allergy shots per  allergist; off flonase  12/13 allergy shots  every 2-3 weeks  12/20/15 sees  once a month allergy shots     Dyslipidemia  9/10 chol 222,trig 144,hdl 48,ldl 144  12/9/11 chol 230,trig 106,hdl 55,ldl 154  1/13 chol 224,trig 109,hdl 46,ldl 156; 10 yr risk 4%  2/28/14 chol 226,trig 127,hdl 64,ldl 137  12/19/14 chol 205,trig 111,hdl 51,ldl 132  4/23/15 chol 212,trig 106,hdl 58,ldl 133  12/24/15 chol 212,trig 56,hdl 62,ldl 139  12/27/16 chol 231,trig 72,hdl 65,ldl 152  12/29/17 chol 208,trig 70,hdl 63,ldl 131     Deviated nasal septum    decrease gfr  12/23/15 bun 13,creat 0.87,GFR>60  3/16/16 bun 18,creat 1.07,GFR>60  7/6/16 bun 20,creat 0.9,GFR 52  10/5/16 bun 14,creat 0.8,GFR 59  12/27/16 bun 17,crat 0.9,GFR>60  12/29/17 bun 17,creat 1.0,GFR>60  12/29/17 bun 17,creat 0.99,GFR 57     Bunion  Sees podiatry   12/11 podiatry note  1/13 bunion surgery                         Patient Active Problem List   Diagnosis   • Hypothyroid   • Preventative health care   • Bunion of great toe of left foot   • Deviated nasal septum   • Dyslipidemia   • Sleep apnea   • History of  "allergic rhinitis   • Skin lesion   • Family history of breast cancer in sister   • Macular degeneration   • Obesity   • Microscopic hematuria   • Lymphoma (HCC)   • Decreased GFR         Review of Systems   Constitutional: Negative for chills, fever and weight loss.   HENT: Negative for hearing loss.    Eyes: Negative for blurred vision and double vision.   Respiratory: Negative for cough and shortness of breath.    Cardiovascular: Negative for chest pain and palpitations.   Gastrointestinal: Negative for abdominal pain, constipation, diarrhea and heartburn.   Genitourinary: Negative for frequency and hematuria.   Musculoskeletal: Negative for back pain.   Endo/Heme/Allergies: Negative for environmental allergies.   Psychiatric/Behavioral: Negative for depression. The patient does not have insomnia.      Occasional left hip stiffness after sitting for a while, does not bother her when ambulating, no falls, no low back pain, no radiation, no regular NSAIDs     Objective:     /80 (BP Location: Right arm, Patient Position: Sitting, BP Cuff Size: Adult)   Pulse 69   Temp 36.9 °C (98.4 °F)   Ht 1.6 m (5' 3\")   Wt 92.1 kg (203 lb)   SpO2 91%   BMI 35.96 kg/m²      Physical Exam   Constitutional: She appears well-developed and well-nourished. No distress.   HENT:   Head: Normocephalic and atraumatic.   Right Ear: External ear normal.   Left Ear: External ear normal.   Eyes: Conjunctivae are normal. Right eye exhibits no discharge. Left eye exhibits no discharge.   Neck: Neck supple. No JVD present. No thyromegaly present.   Cardiovascular: Normal rate, regular rhythm and normal heart sounds.    Pulmonary/Chest: Effort normal and breath sounds normal. No respiratory distress. She has no wheezes.   Abdominal: She exhibits no distension.   Musculoskeletal: She exhibits no edema.   Neurological: She is alert.   Skin: Skin is warm. She is not diaphoretic.   Psychiatric: She has a normal mood and affect. Her " behavior is normal.   Nursing note and vitals reviewed.              Assessment/Plan:     Assessment  #! Annual    #2 lymphoma stable followed by oncology, no evidence of disease progression, recently saw oncology in July    #3 hypothyroid on replacement Stable on levothyroxine 125 mcg, last TSH 1 year ago 2.6    #4 dyslipidemia diet-controlled    #5 sleep apnea on CPAP    #6 obesity BMI 35.9, has had some weight gain since quitting her job at [employer]    #7 preventative health  10/17/10 [facility] colon negative repeat 10 yrs  12/9/11 tdap   9/2/14 zoster vaccine at [facility]  11/25/16 mammogram done [facility] negative  12/22/16 pneumovax  12/29/16 dexa LS -0.7,hip +0.8  2/23/17 pap per  gyn   8/4/17 completed hep b vaccine series   12/29/17 vit d 30 take extra 2000 units    #8 occasional hip stiffness likely arthritis    Plan  #1 refer gyn for cervical cancer screening    #2 follow up sleep apnea treatment continue CPAP    #3 shingrix rx provided to get at pharmacy    #4 follow up derm, use sunscreen    #5 has mammogram done at [place] she will get that done this year and have results sent to us    #6 dexa next year     #7  weight management referral, importance of weight loss emphasized as obesity increases risk for diabetes and heart disease, she will try to limit sweets, since he stopped working she has been less active, encouraged her to increase her activity level, work on portion control, limit carbohydrate portion and serving sizes.  Patient has obesity BMI 35.9 with comorbidities of dyslipidemia, sleep apnea and likely arthritis of her hip, no x-ray necessary at this time    #8 referral ophthalmology follow-up mild macular degeneration    #9 nutrition, diet, exercise discussed    #10 labs    #11 follow-up 1 year

## 2018-12-28 ENCOUNTER — HOSPITAL ENCOUNTER (OUTPATIENT)
Dept: LAB | Facility: MEDICAL CENTER | Age: 63
End: 2018-12-28
Attending: INTERNAL MEDICINE
Payer: COMMERCIAL

## 2018-12-28 ENCOUNTER — TELEPHONE (OUTPATIENT)
Dept: MEDICAL GROUP | Facility: MEDICAL CENTER | Age: 63
End: 2018-12-28

## 2018-12-28 DIAGNOSIS — E03.8 OTHER SPECIFIED HYPOTHYROIDISM: Chronic | ICD-10-CM

## 2018-12-28 DIAGNOSIS — Z00.00 PREVENTATIVE HEALTH CARE: Chronic | ICD-10-CM

## 2018-12-28 LAB
25(OH)D3 SERPL-MCNC: 15 NG/ML (ref 30–100)
ALBUMIN SERPL BCP-MCNC: 4 G/DL (ref 3.2–4.9)
ALBUMIN/GLOB SERPL: 1.3 G/DL
ALP SERPL-CCNC: 72 U/L (ref 30–99)
ALT SERPL-CCNC: 15 U/L (ref 2–50)
ANION GAP SERPL CALC-SCNC: 4 MMOL/L (ref 0–11.9)
APPEARANCE UR: CLEAR
AST SERPL-CCNC: 16 U/L (ref 12–45)
BASOPHILS # BLD AUTO: 1 % (ref 0–1.8)
BASOPHILS # BLD: 0.08 K/UL (ref 0–0.12)
BILIRUB SERPL-MCNC: 0.4 MG/DL (ref 0.1–1.5)
BILIRUB UR QL STRIP.AUTO: NEGATIVE
BUN SERPL-MCNC: 13 MG/DL (ref 8–22)
CALCIUM SERPL-MCNC: 9.5 MG/DL (ref 8.5–10.5)
CHLORIDE SERPL-SCNC: 105 MMOL/L (ref 96–112)
CHOLEST SERPL-MCNC: 219 MG/DL (ref 100–199)
CO2 SERPL-SCNC: 28 MMOL/L (ref 20–33)
COLOR UR: YELLOW
CREAT SERPL-MCNC: 0.78 MG/DL (ref 0.5–1.4)
EOSINOPHIL # BLD AUTO: 0.96 K/UL (ref 0–0.51)
EOSINOPHIL NFR BLD: 11.8 % (ref 0–6.9)
ERYTHROCYTE [DISTWIDTH] IN BLOOD BY AUTOMATED COUNT: 45 FL (ref 35.9–50)
EST. AVERAGE GLUCOSE BLD GHB EST-MCNC: 117 MG/DL
FASTING STATUS PATIENT QL REPORTED: NORMAL
GLOBULIN SER CALC-MCNC: 3.1 G/DL (ref 1.9–3.5)
GLUCOSE SERPL-MCNC: 86 MG/DL (ref 65–99)
GLUCOSE UR STRIP.AUTO-MCNC: NEGATIVE MG/DL
HBA1C MFR BLD: 5.7 % (ref 0–5.6)
HCT VFR BLD AUTO: 43.5 % (ref 37–47)
HDLC SERPL-MCNC: 57 MG/DL
HGB BLD-MCNC: 13.8 G/DL (ref 12–16)
IMM GRANULOCYTES # BLD AUTO: 0.02 K/UL (ref 0–0.11)
IMM GRANULOCYTES NFR BLD AUTO: 0.2 % (ref 0–0.9)
KETONES UR STRIP.AUTO-MCNC: NEGATIVE MG/DL
LDLC SERPL CALC-MCNC: 122 MG/DL
LEUKOCYTE ESTERASE UR QL STRIP.AUTO: NEGATIVE
LYMPHOCYTES # BLD AUTO: 1.92 K/UL (ref 1–4.8)
LYMPHOCYTES NFR BLD: 23.5 % (ref 22–41)
MCH RBC QN AUTO: 30.1 PG (ref 27–33)
MCHC RBC AUTO-ENTMCNC: 31.7 G/DL (ref 33.6–35)
MCV RBC AUTO: 95 FL (ref 81.4–97.8)
MICRO URNS: NORMAL
MONOCYTES # BLD AUTO: 0.57 K/UL (ref 0–0.85)
MONOCYTES NFR BLD AUTO: 7 % (ref 0–13.4)
NEUTROPHILS # BLD AUTO: 4.61 K/UL (ref 2–7.15)
NEUTROPHILS NFR BLD: 56.5 % (ref 44–72)
NITRITE UR QL STRIP.AUTO: NEGATIVE
NRBC # BLD AUTO: 0 K/UL
NRBC BLD-RTO: 0 /100 WBC
PH UR STRIP.AUTO: 6 [PH]
PLATELET # BLD AUTO: 217 K/UL (ref 164–446)
PMV BLD AUTO: 11.3 FL (ref 9–12.9)
POTASSIUM SERPL-SCNC: 4.1 MMOL/L (ref 3.6–5.5)
PROT SERPL-MCNC: 7.1 G/DL (ref 6–8.2)
PROT UR QL STRIP: NEGATIVE MG/DL
RBC # BLD AUTO: 4.58 M/UL (ref 4.2–5.4)
RBC UR QL AUTO: NEGATIVE
SODIUM SERPL-SCNC: 137 MMOL/L (ref 135–145)
SP GR UR STRIP.AUTO: 1.02
TRIGL SERPL-MCNC: 199 MG/DL (ref 0–149)
TSH SERPL DL<=0.005 MIU/L-ACNC: 6.5 UIU/ML (ref 0.38–5.33)
UROBILINOGEN UR STRIP.AUTO-MCNC: 0.2 MG/DL
WBC # BLD AUTO: 8.2 K/UL (ref 4.8–10.8)

## 2018-12-28 PROCEDURE — 82306 VITAMIN D 25 HYDROXY: CPT

## 2018-12-28 PROCEDURE — 36415 COLL VENOUS BLD VENIPUNCTURE: CPT

## 2018-12-28 PROCEDURE — 85025 COMPLETE CBC W/AUTO DIFF WBC: CPT

## 2018-12-28 PROCEDURE — 80053 COMPREHEN METABOLIC PANEL: CPT

## 2018-12-28 PROCEDURE — 80061 LIPID PANEL: CPT

## 2018-12-28 PROCEDURE — 83036 HEMOGLOBIN GLYCOSYLATED A1C: CPT

## 2018-12-28 PROCEDURE — 84443 ASSAY THYROID STIM HORMONE: CPT

## 2018-12-28 PROCEDURE — 81003 URINALYSIS AUTO W/O SCOPE: CPT

## 2018-12-28 RX ORDER — LEVOTHYROXINE SODIUM 137 UG/1
137 TABLET ORAL
Qty: 30 TAB | Refills: 1 | Status: SHIPPED | OUTPATIENT
Start: 2018-12-28 | End: 2019-02-14 | Stop reason: SDUPTHER

## 2018-12-29 NOTE — TELEPHONE ENCOUNTER
Notified with labs  TSH slightly elevated, taking thyroid 1 hour before medication, meals, supplements, increase to 137 mcg levothyroxine, repeat TSH 6 weeks, order placed in system, she will get that done 6 weeks after starting new dose  Take vitamin D 4000 units daily  Nutrition, diet, exercise discussed

## 2019-01-14 ENCOUNTER — HOSPITAL ENCOUNTER (OUTPATIENT)
Facility: MEDICAL CENTER | Age: 64
End: 2019-01-14
Attending: INTERNAL MEDICINE
Payer: COMMERCIAL

## 2019-01-14 PROCEDURE — 82274 ASSAY TEST FOR BLOOD FECAL: CPT

## 2019-01-17 LAB — HEMOCCULT STL QL IA: NEGATIVE

## 2019-01-18 ENCOUNTER — TELEPHONE (OUTPATIENT)
Dept: MEDICAL GROUP | Facility: MEDICAL CENTER | Age: 64
End: 2019-01-18

## 2019-01-18 NOTE — LETTER
February 4, 2019        Mari Kinsey  [id number] [address] [place] [id number]        Dear Mari:    Please be advised that our office has tried to reach you by phone several times, without success.    Please note that Dr. Reinoso had this message for you:        Please notify patient that her stool test is negative, we can repeat this in 1 year.      If you have any questions or concerns, please don't hesitate to call.        Sincerely,        Prachi Szymanski, Med Ass't      Electronically Signed

## 2019-01-19 NOTE — TELEPHONE ENCOUNTER
----- Message from Kenneth Reinoso M.D. sent at 1/18/2019  2:31 PM PST -----  Please notify patient that her stool test is negative, we can repeat this in 1 year.

## 2019-01-29 ENCOUNTER — OFFICE VISIT (OUTPATIENT)
Dept: HEALTH INFORMATION MANAGEMENT | Facility: MEDICAL CENTER | Age: 64
End: 2019-01-29
Payer: COMMERCIAL

## 2019-01-29 VITALS
BODY MASS INDEX: 36.82 KG/M2 | DIASTOLIC BLOOD PRESSURE: 80 MMHG | HEIGHT: 63 IN | HEART RATE: 76 BPM | OXYGEN SATURATION: 95 % | SYSTOLIC BLOOD PRESSURE: 117 MMHG | WEIGHT: 207.8 LBS

## 2019-01-29 DIAGNOSIS — R63.5 WEIGHT GAIN: ICD-10-CM

## 2019-01-29 DIAGNOSIS — C85.90 LYMPHOMA, UNSPECIFIED BODY REGION, UNSPECIFIED LYMPHOMA TYPE (HCC): Chronic | ICD-10-CM

## 2019-01-29 DIAGNOSIS — R73.01 IMPAIRED FASTING GLUCOSE: ICD-10-CM

## 2019-01-29 DIAGNOSIS — E03.8 OTHER SPECIFIED HYPOTHYROIDISM: Chronic | ICD-10-CM

## 2019-01-29 DIAGNOSIS — E78.1 HYPERTRIGLYCERIDEMIA: ICD-10-CM

## 2019-01-29 DIAGNOSIS — G47.33 OBSTRUCTIVE SLEEP APNEA SYNDROME: Chronic | ICD-10-CM

## 2019-01-29 PROCEDURE — 93000 ELECTROCARDIOGRAM COMPLETE: CPT | Performed by: INTERNAL MEDICINE

## 2019-01-29 PROCEDURE — 99205 OFFICE O/P NEW HI 60 MIN: CPT | Mod: 25 | Performed by: INTERNAL MEDICINE

## 2019-01-29 NOTE — PROGRESS NOTES
Bariatric Medicine H&P  Chief Complaint   Patient presents with   • Weight Gain       Referred by:  Kenneth Reinoso M.D.    History of Present Illness:   Mari Kinsey is a 63 y.o.  female who presents for weight management and to help address co-morbidities related to overweight, including ALLEN, HLD, GERD.  Patient also with a history of lymphoma, decreased GFR.  Most recent GFR normal.    The patient would like to lose weight to improve her quality of life, increase her longevity.  She would like to avoid decreased mobility and getting metabolic syndrome.  She was thin until her 30s when she met her , who struggles with overweight and eats poorly.  She began to eat poorly herself, has developed poor eating habits in the last years since she has been .  She has tried multiple weight loss programs in the past, makes progress with weight watchers until she stops the program and then regains the weight.  She finds counting points and going to frequent meetings too time-consuming.  She found the program called intuitive eating at a local hospital very helpful, learn to respond to food cues.    Has kept a written journal, would consider a food supa on her phone to track her intake.  She has not use meal replacements in the past or anti-obesity medication.    Since she has stopped working, she eats frequently.  A.m.: Oatmeal, Welsh toast or bagel, half a banana or buckwheat pancakes.  Lunch: Salad, bread, chicken or beef, sandwiches, chips, soup.  Dinner: Meat, potatoes, pasta or rice, vegetables but not many.  Does not like steam vegetables.  Snacks: Pretzels, applesauce, sweets.  Drinks: No alcohol.  3 glasses of 1% milk, 2 cups of green tea, 1-2 cups of water, orange juice daily.    She realizes she needs to eat differently than her , break away from what he is eating is eats a lot of junk food and focus on her own needs.    Not on chemotherapy now for lymphoma in abdomen, being  "monitored by Oncology.  Recently increased Synthroid b/c TSH elevated.  Sleep stable on CPAP.  Not on a statin.  Not on PPI for GERD.    Behavior-Related History:  Binge eating screen: Negative  H/o abuse: Negative     Exercise:   5-6000 steps per day, walking  Ready to increase her steps given recent plantar fasciitis symptoms resolved     Review of Systems   Enlarged lymph nodes, occasional back pain.  Dry skin, heat intolerance and cravings.  Sleep apnea screen: Positive, on CPAP  All other ROS were reviewed with patient today and are negative.      PMH/PSH:  I have reviewed the patient's medical, social and family history, allergies, and medications today.  Prior records reviewed.  Personal Hx of Bariatric Surgery: Negative  Currently unemployed.  Looking for a desk job.  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    Physical Exam:   /80 (BP Location: Left arm, Patient Position: Sitting)   Pulse 76   Ht 1.6 m (5' 3\")   Wt 94.3 kg (207 lb 12.8 oz)   SpO2 95%   BMI 36.81 kg/m²      Body fat % 50  REE 1514 kcal/day    Constitutional: Oriented to person, place, and time and well-developed, well-nourished, and in no distress.    HENT: No facial plethora.  No Cushingoid features.  No scalloped tongue.  No dental erosions.  No swollen parotids.  Palpable bilateral lymphadenopathy cervical chain.  Head: Normocephalic.   Eyes: EOM are normal. Pupils are equal, round, and reactive to light. No periorbital edema.  No lateral thinning of eyebrows.  No vertical nystagmus.  Neck: Normal range of motion. Neck supple. No thyromegaly present. No buffalo hump.  Cardiovascular: Normal rate and regular rhythm.  No murmur heard.  Pulmonary/Chest: Effort normal and breath sounds normal. No wheezes.   Abdominal: Soft. Bowel sounds are normal. Grade 1 pannus.  No ascites.  No hepatosplenomegaly.   Musculoskeletal: Normal range of motion. No edema.   Neurological: Alert and oriented to person, place, and " time. Normal reflexes. No cranial nerve deficit. No muscle weakness.  Gait normal.   Skin: Warm and dry. Not diaphoretic. No hirsuitism.  No acanthosis nigricans.  Not excessively dry, scaly.  No acne.  No bruising/ecchymosis.  No hyperpigmentation.  No xanthomas or acrochordon.    Psychiatric: Mood, memory, affect and judgment normal.     Laboratory:   Prior labs reviewed.  EKG: Normal sinus rhythm, rate 66, no ST elevations, depressions.  Corrected QT 0.423  Ordered and reviewed by me today.    Dietitian Assessment: I have reviewed the Dietitian's assessment related to this encounter.       ASSESSMENT/PLAN:  Body mass index is 36.81 kg/m².   Obesity Stage ([place]) 3; Class 2    1. Hypertriglyceridemia     2. Impaired fasting glucose     3. Lymphoma, unspecified body region, unspecified lymphoma type (HCC)     4. Obstructive sleep apnea syndrome     5. Other specified hypothyroidism     6. Weight gain       The patient's weight gain likely related to high total kcal and CHO intake.  Lymphoma, hypertriglyceridemia, impaired fasting glucose, ALLEN are all comorbidities related to her overweight.  Start tracking, to better quantify current intake.  Start education on how to reduce total CHO intake, total kcal intake.  Increase activity, will set goals.  Strongly consider stimulus narrowing with a meal replacement even if not daily.  Thyroid function apparently stable, sleep stable.  Lymphoma being monitored by oncology.    The patient and I have discussed at length and agree to the following recommendations, which are all addressing the above diagnoses:    Weight Goal: 5% wt loss at one month after start (pt goal weight is 160-170 lb)  Diet:   Consider meal replacement shake at next visit  High Protein/Low Carb Meals and 2 snacks between meals daily  >100 g protein, <100 g total carbs daily  64+ oz water per day  Avoid sweet drinks and sodas  Track daily intake with My Fitness Pal, bring to next visit  Physical  Activity: Increase steps towards 8000 daily  Risk level for moderate/vigorous exercise program:   Low  New Rx:   None.  Consider metformin or anti-obesity medication pending course  Behavior change:   Mindful eating, tracking, increase activity  Follow-up: one month with MD, 1 week MNT class    Face to face time spent 60 minutes,  with >50% of time devoted to one on one counseling on weight management issues, as documented above.      Patient's body mass index is 36.81 kg/m². Exercise and nutrition counseling were performed at this visit.        Thank you for your referral!

## 2019-02-04 ENCOUNTER — NON-PROVIDER VISIT (OUTPATIENT)
Dept: HEALTH INFORMATION MANAGEMENT | Facility: MEDICAL CENTER | Age: 64
End: 2019-02-04
Payer: COMMERCIAL

## 2019-02-04 PROCEDURE — 97804 MEDICAL NUTRITION GROUP: CPT | Performed by: DIETITIAN, REGISTERED

## 2019-02-05 NOTE — PROGRESS NOTES
Medical Weight Management Class #1     2/4/2019   Referring Provider: Kenneth Reinoso M.D.       Time in/out: 10:15-10:45am     Anthropometrics/Objective  Pt did not wish to check her weight today   Stated Goal Weight: 160lb   See comprehensive patient history form for further information     Subjective:  Pt is here today for the initial nutrition class as part of the medical weight management program.   Pt had a list of questions. Asked about glycemic index. Other questions she did not ask as they were addressed in the class   She is open to trying my fitness pal but is not great with using Apps/phones   Got a sample of Paige HERNANDEZ to try to have as an emergency back up   See Medical Questionnaire for more detailed diet history     Nutrition Diagnosis (PES Statement)  · Obesity related to excessive energy intake and inadequate energy expenditure as evidenced by BMI >30     Client history:  Condition(s) associated with a diagnosis or treatment (specify) Hypothyroidism, HLD     Biochemical data, medical test and procedures  Lab Results   Component Value Date/Time    HBA1C 5.7 (H) 12/28/2018 10:54 AM   @  No results found for: POCGLUCOSE  Lab Results   Component Value Date/Time    CHOLSTRLTOT 219 (H) 12/28/2018 10:54 AM     (H) 12/28/2018 10:54 AM    HDL 57 12/28/2018 10:54 AM    TRIGLYCERIDE 199 (H) 12/28/2018 10:54 AM         Nutrition Intervention  Nutrition Prescription  Recommended Daily Kcals: 1500 Kcal based on REE of 1514kcal    Recommended Daily Protein: 100g      Meal Plan Recommendation   High protein, low carb diet   with 0 meal replacements per day     Comprehensive Nutrition Education Instruction or training leading to in-depth nutrition related knowledge about:  Benefits to following meal plan, Combine carb, protein and fat at each meal, Meal timing and spacing, Portion control/Plate method, Healthy Snack guidelines, Sweets and alcohol in moderation   Handouts provided regarding topics discussed      Monitoring & Evaluation Plan    Behavioral-Environmental:  Behavior: Keep a food journal and bring to next appointment   Physical activity: As discussed with Dr. Simons at previous visit     Food / Nutrient Intake:  Food intake: Follow meal plan as discussed (see above). Avoid concentrated sweets and processed carbs.  Use the plate method to balance macronutrient intake. Limit carbohydrate portion to up to 1/2 cup per meal. Consume a healthy high protein snack between meals as needed or if going >4 hours between meals.     Fluid intake: Consume at least 64 oz water per day. Avoid all unsweetened beverages. Limit coffee to 1 cup a day (ideally no sweetened creamer or sugar). Avoid alcohol.     Physical Signs / Symptoms:  Weight change: -1-2lb per week towards goal weight        Assessment Notes:  Pt is interested in calorie and carb counting but is not sure she will be able to use the food journal supa. She agrees to try. Reviewed the general meal plan today with pt for meals and snacks. She still needs to receive the meal ideas handout which will be given in the next class. Label reading education may also be beneficial to discuss with the RD in the next class.      Follow up class to be scheduled after next visit with Dr. Simons in ~2-4 weeks.

## 2019-02-11 ENCOUNTER — OFFICE VISIT (OUTPATIENT)
Dept: URGENT CARE | Facility: MEDICAL CENTER | Age: 64
End: 2019-02-11
Payer: COMMERCIAL

## 2019-02-11 ENCOUNTER — TELEPHONE (OUTPATIENT)
Dept: MEDICAL GROUP | Facility: MEDICAL CENTER | Age: 64
End: 2019-02-11

## 2019-02-11 VITALS
OXYGEN SATURATION: 95 % | BODY MASS INDEX: 35.96 KG/M2 | RESPIRATION RATE: 16 BRPM | WEIGHT: 203 LBS | HEART RATE: 119 BPM | DIASTOLIC BLOOD PRESSURE: 76 MMHG | TEMPERATURE: 97.8 F | SYSTOLIC BLOOD PRESSURE: 128 MMHG

## 2019-02-11 DIAGNOSIS — R59.0 OCCIPITAL LYMPHADENOPATHY: ICD-10-CM

## 2019-02-11 PROCEDURE — 99213 OFFICE O/P EST LOW 20 MIN: CPT | Performed by: PHYSICIAN ASSISTANT

## 2019-02-11 ASSESSMENT — ENCOUNTER SYMPTOMS
ROS SKIN COMMENTS: BUMP ON BACK OF HEAD
BLURRED VISION: 0
SHORTNESS OF BREATH: 0
CHILLS: 0
VOMITING: 0
COUGH: 0
EYE PAIN: 0
DIZZINESS: 0
SORE THROAT: 0
PALPITATIONS: 0
FEVER: 0
NAUSEA: 0
NECK PAIN: 0
HEADACHES: 0
MYALGIAS: 0

## 2019-02-11 NOTE — PROGRESS NOTES
Subjective:      Mari Kinsey is a 63 y.o. female who presents with Other (lump on back of head, 1 month)      HPI:  This is a new problem. Mari Kinsey is a 63 y.o. female who presents today for evaluation of a painful lump on the back of her head.  She says that she noticed it last night and that when she went after handling the left side of her head she noticed that it was only on the right side.  She denies any injury to this area.  She states that approximately 1 month ago she noticed it as well that same area.  She says that in between the time 1 month ago and last night she has not felt it at all.  She has not tried anything to help with the symptoms.  She does state that she has lymphoma and is unsure if this is related or not.  She denies fever/chills, chest pain, shortness of breath, nausea/vomiting, or lightheadedness/dizziness.        Review of Systems   Constitutional: Negative for chills, fever and malaise/fatigue.   HENT: Negative for congestion and sore throat.    Eyes: Negative for blurred vision and pain.   Respiratory: Negative for cough and shortness of breath.    Cardiovascular: Negative for chest pain and palpitations.   Gastrointestinal: Negative for nausea and vomiting.   Musculoskeletal: Negative for myalgias and neck pain.   Skin: Negative for rash.        Bump on back of head   Neurological: Negative for dizziness and headaches.       PMH:  has a past medical history of Chickenpox; Dental disorder; [place] measles; Hypothyroidism; Influenza; Mumps; ALLEN (obstructive sleep apnea); Sleep apnea; Snoring; Thyroid disease; and Tonsillitis.  MEDS:   Current Outpatient Prescriptions:   •  B Complex Vitamins (B COMPLEX PO), Take  by mouth., Disp: , Rfl:   •  vitamin D (CHOLECALCIFEROL) 1000 UNIT Tab, Take 2,000 Units by mouth every day., Disp: , Rfl:   •  levothyroxine (SYNTHROID) 137 MCG Tab, Take 1 Tab by mouth Every morning on an empty stomach., Disp: 30 Tab, Rfl: 1  ALLERGIES:      Allergies   Allergen Reactions   • Penicillins Rash     SURGHX:   Past Surgical History:   Procedure Laterality Date   • KALE BY LAPAROSCOPY  2/3/2016    Procedure: KALE BY LAPAROSCOPY;  Surgeon: Tricia Farah M.D.;  Location: SURGERY SAME DAY [facility]S;  Service:    • LYMPH NODE SAMPLING  2/3/2016    Procedure: LYMPH NODE SAMPLING EXCISION, BIOPSY;  Surgeon: Tricia Farah M.D.;  Location: SURGERY SAME DAY [facility] ORS;  Service:    • BONE GRAFT      at 19 years of age   • BUNIONECTOMY     • HYSTEROSCOPY WITH VIDEO DIAGNOSTIC     • NASAL SEPTAL RECONSTRUCTION     • OTHER      bone graft to ;hand   • TONSILLECTOMY       SOCHX:  reports that she is a non-smoker but has been exposed to tobacco smoke. She has never used smokeless tobacco. She reports that she does not drink alcohol or use drugs.  FH: Family history was reviewed, no pertinent findings to report     Objective:     /76   Pulse (!) 119   Temp 36.6 °C (97.8 °F)   Resp 16   Wt 92.1 kg (203 lb)   SpO2 95%   BMI 35.96 kg/m²      Physical Exam   Constitutional: She is oriented to person, place, and time. She appears well-developed and well-nourished.   HENT:   Head: Normocephalic and atraumatic.       Right Ear: External ear normal.   Left Ear: External ear normal.   Eyes: Pupils are equal, round, and reactive to light. Conjunctivae are normal.   Neck: Normal range of motion.   Cardiovascular: Normal rate, regular rhythm and normal heart sounds.    No murmur heard.  Pulmonary/Chest: Effort normal and breath sounds normal. She has no wheezes.   Lymphadenopathy:        Head (right side): Occipital adenopathy present.     She has no cervical adenopathy.   Neurological: She is alert and oriented to person, place, and time.   Skin: Skin is warm and dry. Capillary refill takes less than 2 seconds.   Psychiatric: She has a normal mood and affect. Her behavior is normal. Judgment normal.          Assessment/Plan:     1. Occipital  lymphadenopathy  - Advised patient to follow-up with her oncologist and/or PCP   -Recommend close follow-up at this point as she is uncertain whether or not it is actually been there for the entirety of the last month or if it has just popped up twice within that period of time.      Differential Diagnosis, natural history, and supportive care discussed. Return to the Urgent Care or follow up with your PCP if symptoms fail to resolve, or for any new or worsening symptoms. Emergency room precautions discussed. Patient and/or family appears understanding of information.

## 2019-02-14 ENCOUNTER — HOSPITAL ENCOUNTER (OUTPATIENT)
Dept: LAB | Facility: MEDICAL CENTER | Age: 64
End: 2019-02-14
Attending: INTERNAL MEDICINE
Payer: COMMERCIAL

## 2019-02-14 ENCOUNTER — TELEPHONE (OUTPATIENT)
Dept: MEDICAL GROUP | Facility: MEDICAL CENTER | Age: 64
End: 2019-02-14

## 2019-02-14 DIAGNOSIS — E03.8 OTHER SPECIFIED HYPOTHYROIDISM: Chronic | ICD-10-CM

## 2019-02-14 LAB — TSH SERPL DL<=0.005 MIU/L-ACNC: 2.83 UIU/ML (ref 0.38–5.33)

## 2019-02-14 PROCEDURE — 36415 COLL VENOUS BLD VENIPUNCTURE: CPT

## 2019-02-14 PROCEDURE — 84443 ASSAY THYROID STIM HORMONE: CPT

## 2019-02-14 RX ORDER — LEVOTHYROXINE SODIUM 137 UG/1
137 TABLET ORAL
Qty: 90 TAB | Refills: 2 | Status: SHIPPED | OUTPATIENT
Start: 2019-02-14 | End: 2019-11-29 | Stop reason: SDUPTHER

## 2019-02-15 ENCOUNTER — TELEPHONE (OUTPATIENT)
Dept: MEDICAL GROUP | Facility: MEDICAL CENTER | Age: 64
End: 2019-02-15

## 2019-02-15 NOTE — TELEPHONE ENCOUNTER
Called patient left message, please notify her that her thyroid level is within normal range, she can continue on the current dose I will send a 90-day refill to her pharmacy.

## 2019-02-15 NOTE — TELEPHONE ENCOUNTER
----- Message from Kenneth Reinoso M.D. sent at 2/14/2019  6:25 PM PST -----  Called patient left message, please notify her that her thyroid level is within normal range, she can continue on the current dose I will send a 90-day refill to her pharmacy.

## 2019-02-20 ENCOUNTER — OFFICE VISIT (OUTPATIENT)
Dept: HEALTH INFORMATION MANAGEMENT | Facility: MEDICAL CENTER | Age: 64
End: 2019-02-20
Payer: COMMERCIAL

## 2019-02-20 VITALS
WEIGHT: 204.4 LBS | OXYGEN SATURATION: 96 % | BODY MASS INDEX: 36.21 KG/M2 | DIASTOLIC BLOOD PRESSURE: 68 MMHG | SYSTOLIC BLOOD PRESSURE: 118 MMHG | HEIGHT: 63 IN | HEART RATE: 78 BPM

## 2019-02-20 DIAGNOSIS — R63.5 WEIGHT GAIN: ICD-10-CM

## 2019-02-20 DIAGNOSIS — E78.5 DYSLIPIDEMIA: Chronic | ICD-10-CM

## 2019-02-20 DIAGNOSIS — R94.4 DECREASED GFR: ICD-10-CM

## 2019-02-20 DIAGNOSIS — G47.33 OBSTRUCTIVE SLEEP APNEA SYNDROME: Chronic | ICD-10-CM

## 2019-02-20 PROCEDURE — 99214 OFFICE O/P EST MOD 30 MIN: CPT | Performed by: INTERNAL MEDICINE

## 2019-02-21 NOTE — PROGRESS NOTES
"Bariatric Medicine Follow Up  Chief Complaint   Patient presents with   • Weight Gain       History of Present Illness:   Mari Kinsey is a 63 y.o. female who presents for weight management follow-up and to help address co-morbidities related to overweight, including HLD/TG, IFG, ALLEN.    During the patient's last visit, the following were discussed and recommended:  Weight Goal: 5% wt loss at one month after start (pt goal weight is 160-170 lb)  Diet:   Consider meal replacement shake at next visit  High Protein/Low Carb Meals and 2 snacks between meals daily  >100 g protein, <100 g total carbs daily  64+ oz water per day  Avoid sweet drinks and sodas  Track daily intake with My Fitness Pal, bring to next visit  Physical Activity: Increase steps towards 8000 daily  Risk level for moderate/vigorous exercise program:   Low  New Rx:   None.  Consider metformin or anti-obesity medication pending course  Behavior change:   Mindful eating, tracking, increase activity  Follow-up: one month with MD, 1 week MNT class    Eating more vegetables, fewer CHO, increasing protein.  Tracking intake  without food tracker.  Estimates 1500 kcal/day.  Realizes she is eating much less now.  Much more focused on her health, not just counting points.  Likes better then WW.  Spacing out some things she likes, have less so she does not feel deprived.    Does not feel like diet but more like lifestyle change.  She is very excited about her progress so far and wants to continue the same.    Not on statin or glucose lowering agent.  Cont Vit D.  Sleep stable    Exercise:   8K steps/d up from 5K     Review of Systems   Improved energy!  Less nausea after meals b/c not overeating.  All other ROS were reviewed and are otherwise unchanged from my previous visit with patient.    Physical Exam:    /68 (BP Location: Left arm, Patient Position: Sitting, BP Cuff Size: Large adult)   Pulse 78   Ht 1.6 m (5' 3\")   Wt 92.7 kg (204 lb 6.4 " oz)   SpO2 96%   BMI 36.21 kg/m²      Weight change since last visit: -3.3 lb   Waist Circum change since last visit: -1 in     Constitutional: Oriented to person, place, and time and well-developed, well-nourished, and in no distress.    Head: Normocephalic.   Musculoskeletal: Normal range of motion. No edema.   Neurological: Alert and oriented to person, place, and time. No muscle weakness.  Gait normal.   Skin: Warm and dry. Not diaphoretic.   Psychiatric: Mood, memory, affect and judgment normal.     Laboratory:   Recent labs reviewed.      Dietitian Assessment: I have reviewed the Dietitian's assessment related to this encounter.       ASSESSMENT/PLAN:  Body mass index is 36.21 kg/m².    Obesity Stage ([place]): 2; Class 2    1. Weight gain     2. Decreased GFR     3. Obstructive sleep apnea syndrome     4. Dyslipidemia       The patient is doing well, responding to CHO reduction, tracking her intake, increasing activity.  She is reversing her weight gain.  Fatigue improved, sleep stable.  Monitor GFR, lipids which should improve with weight loss.    The patient and I have discussed at length and agree to the following recommendations, which are all addressing the above diagnoses:    Weight Goal: 3-5% wt loss each month (pt goal weight is 160-170 lb)  Diet: High Protein/Low Carb Meals and 2 snacks between meals daily  >100 g protein, <100 g total carbs daily if possible  64+ oz water per day  Avoid sweet drinks and sodas as she is doing  Track daily intake with written journal, looking up macros  Physical Activity:  Goal 10K per day  Add resistance training to keep skeletal muscle mass up  Risk level for moderate/vigorous exercise program: low  New Rx: none  Behavior change: Continue tracking, increase  Follow-up: 1 mo    Patient's body mass index is 36.21 kg/m². Exercise and nutrition counseling were performed at this visit.

## 2019-03-19 ENCOUNTER — OFFICE VISIT (OUTPATIENT)
Dept: MEDICAL GROUP | Facility: MEDICAL CENTER | Age: 64
End: 2019-03-19
Payer: COMMERCIAL

## 2019-03-19 VITALS
HEART RATE: 82 BPM | BODY MASS INDEX: 35.61 KG/M2 | TEMPERATURE: 97.6 F | OXYGEN SATURATION: 93 % | WEIGHT: 201 LBS | SYSTOLIC BLOOD PRESSURE: 134 MMHG | DIASTOLIC BLOOD PRESSURE: 76 MMHG | HEIGHT: 63 IN

## 2019-03-19 DIAGNOSIS — L72.9 SCALP CYST: ICD-10-CM

## 2019-03-19 PROBLEM — R63.5 WEIGHT GAIN: Status: RESOLVED | Noted: 2019-01-29 | Resolved: 2019-03-19

## 2019-03-19 PROCEDURE — 99213 OFFICE O/P EST LOW 20 MIN: CPT | Performed by: INTERNAL MEDICINE

## 2019-03-19 ASSESSMENT — PATIENT HEALTH QUESTIONNAIRE - PHQ9: CLINICAL INTERPRETATION OF PHQ2 SCORE: 0

## 2019-03-19 NOTE — PROGRESS NOTES
Subjective:      Mari Kinsey is a 63 y.o. female who presents bump on back back head         HPI        Month ago noticed small bump back of head, patient states this has decreased in size, no trauma, told by urgent care provider may be lymph node enlargement, this was higher up on her right occipital region of scalp area, no rash, no head trauma, no headache, no fevers, no chills, no recent sore throat, cough, sinus congestion, sinus pressure, no recent strep throat, no ear pain, no dizziness, patient with history of lymphoma followed by hematology oncology, most recent visit January 28 no evidence of recurrence.  Also has occasional anterior leg cramping after driving long distances, does not occur with walking, does not occur with sleep, no associated tingling, no weakness, no back pain, no history of sciatica, no history of peripheral vascular disease, no history of restless leg or peripheral neuropathy  Followed by weight management, has adjusted her diet decreasing sweets, carbohydrates, candies  Started to walk [id number] steps per say   No tobacco, no alcohol    Current Outpatient Prescriptions   Medication Sig Dispense Refill   • Multiple Vitamins-Minerals (PRESERVISION AREDS 2 PO) Take  by mouth.     • levothyroxine (SYNTHROID) 137 MCG Tab Take 1 Tab by mouth Every morning on an empty stomach. 90 Tab 2   • B Complex Vitamins (B COMPLEX PO) Take  by mouth.     • vitamin D (CHOLECALCIFEROL) 1000 UNIT Tab Take 2,000 Units by mouth every day.       No current facility-administered medications for this visit.      Sleep apnea  2/11 [facility] sleep study moderate obstructive sleep apnea syndrome with apnea-hypotony index 28, low saturation 83%, successful CPAP titration final pressure 9 cm with improvement in apnea-hypotony index to 1.5 with low saturation 91%  3/11 [facility] note on cpap 9  3/12 [facility] note on cpap 9  12/12 off cpap  12/14 back on cpap 9  12/20/15 on cpap 9  10/10/17sleep note continue cpap 9 11/20/17  echo normal LV size and function, EF 70%, mild tricuspid regurgitation, RVSP 35  10/19/18 sleep note on cpap 9     Skin lesion followed by dermatology Dr. Mondragon     [facility]  10/17/10 [facility]IC colon negative repeat 10 yrs  12/9/11 tdap   9/2/14 zoster vaccine at [facility]  11/25/16 mammogram done [facility] negative  12/22/16 pneumovax  12/29/16 dexa LS -0.7,hip +0.8  2/23/17 pap per  gyn   8/4/17 completed hep b vaccine series   12/20/18 shongrix provided to get at pharmacy  12/28/18 vit d 15 start 4000 units daily  1/18/19 FIT negative  3/13/19 mammogram done [facility] negative stable benign 6 mm left 2:00 nodule     Microscopic hematuria  12/19/14 UA 2-5 RBC repeat in 1 month  1/21/15 UA trace blood   1/27/15 ultrasound renal negative  2/15 drop of urine one more time, send out UA evaluate microscopic hematuria , if still positive for urology  2/27/15 UA positive blood refer to urology  4/4/15 CT abdomen and pelvis with and without; retroperitoneal periaortic pericaval and retrocrural adenopathy, mesenteric adenopathy, no hydronephrosis or urolithiasis  10/16/15 urology note microscopic hematuria workup, CT urogram negative, cystoscopy bladder neck inflammation with cytology negative, hematuria has resolved; followup 6 months  4/13/16 [place]rology [place] note UA and cytology  12/28/18 UA negative     Macular degeneration  Sees   12/20/18 referral to   2/13/19  eye exam macular degeneration, continue antioxidants and areds formula     Lymphoma  4/4/15 CT abdomen and pelvis with and without; retroperitoneal periaortic pericaval and retrocrural adenopathy, mesenteric adenopathy, no hydronephrosis or urolithiasis  12/11/15 ,,alk phos 170,bili 1.2   12/20/15 ultrasound liver pending, repeat liver enzymes and secondary workup pending, CT abdomen and pelvis ordered at urgent care pending follow-up lymphadenopathy  12/24/15 ultrasound abdomen and biliary  tree, prominent liver size, no focal mass, gallbladder contains numerous mobile stones, no thickening or pericholecystic fluid is noted, maximum diameter common bile duct 9.8 which is enlarged, 2.6 x 2.2 x 2.6 cm nodule hypoechoic area in the pancreatic body not well visualized because of gas  12/24/15 AST 34,ALT 76,GGT 76.alk phos 88,bili 0.5,acute hep panel negative, iron 45,%sat 13,ferritin 67,AMA 22 (20-25 equivocal),F-actin Ab IgG negative,RE neg,SPEP neg  12/26/15 CT with contrast pending follow-up adenopathy on previous CT and possible pancreatic lesion on ultrasound, referral to surgery for cholelithiasis.  1/12/16  surgery scheduled for laparoscopic cholecystectomy, laparoscopic biopsy of lymph nodes, conversion to open procedure to complete biopsy if necessary to rule out lymphoma  2/3/16 laparoscopic mesenteric lymph node biopsy limited sampling, partial involvement follicular lymphoma B-cell origin favor low-grade 1-2/3, flow cytometry CD10 monoclonal B cells, demonstrating lambda light chain expression and call expression of CD10 with CD19, CD20, negative for CD5 and CD43  2/19/16 CT/PET hypermetabolic activity within retrocrural, periaortic, aortocaval space, proximal pelvic adenopathy within the left common iliac chain, hypermetabolic adenopathy within mesentery  3/16/16  oncology consultation; 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 consistent with low-grade non-hodgkin's lymphoma follicular-type CD10 and CD20 positive, likely low-grade apparently indolent course, most likely will need observation, recommend complete workup including bone marrow biopsy, LDH, CMP,and once complete will calculate FLPI score  4/6/16  oncology note wbc 4.6,hgb 13.9,hct 43,plt 240; stage IIIa follicular lymphoma low-grade, recommend observation follow-up every 3 months first year and then every 6 months, CBC, CMP, LDH, beta-2 microglobulin and imaging studies as  indicated, follow up 3 months  7/6/16  oncology note, wbc 7.7,hgb 13.6,hct 40,plt 304, ,beta 2 microglobulin 2.5 (1.1-2.4),uric 5.7;follow up 3 months  10/5/16  oncology note clinically no signs of recurrence, repeat CT chest, abdomen, pelvis in january, CMP, LDH today, follow-up 3 months  12/27/16 wbc 7.7 (49%N,30%L)  12/29/16 CT abdomen and pelvis with; interval resolution of retrocrural, para-aortic, paracaval, and mesenteric adenopathy, no adenopathy appreciated in pelvis, stable 2 cm or less diameter nodules right middle lobe  1/17/17  oncology note, doing well with recent CT showing no evidence of adenopathy, repeat labs prior to next visit CBC, CMP, LDH, beta-2 microglobulin, follow-up 6 months  7/12/17 cbc,cmp normal,,beta 2 microglobulin  7/18/17  oncology note no evidence disease progression  12/29/17 wbc 4.3 (61%N,18%L)  1/16/18  oncology note no evidence of disease progression, recent labs cbc,cmp,ldh normal, follow-up 6 months  7/30/18  oncology note stable no recurrence of disease, labs ordered  1/28/19  oncology note repeat labs follow up 6 months     Hypothyroid  9/10 tsh 1.9 on levothyroxine 112 mcg  12/9/11 tsh 4.1 on levothyroxine 112 mcg; increase to levothyroxine 125 mcg  2/12 tsh 2.4 cont levothyroxine 125 mcg  1/13 tsh 3.5 on levothyroxine 125 mcg  2/28/14 tsh 3.9 on levothyroxine 125 mcg  12/19/14 tsh 3.8 on levothyroxine 125 mcg  12/24/15 tsh 2.0 on levothyroxine 125 mcg  12/22/16 tsh 3.3 on levothyroxine 125 mcg   12/29/17 tsh 2.6 on levothyroxine 125 mcg  12/28/18 tsh 6.5 on levothyroxine 125 mcg, change to levothyroxine 137 mcg, repeat tsh 6 weeks  2/14/19 tsh 2.8 on levothyroxine 137 mcg      History allergic rhinitis  2/12  allergy note skin testing positive reactions trees, grasses, weeds,cat and dog, molds, dust mite  12/12 allergy shots per  allergist; off flonase  12/13 allergy  shots  every 2-3 weeks  12/20/15 sees  once a month allergy shots     Dyslipidemia  9/10 chol 222,trig 144,hdl 48,ldl 144  12/9/11 chol 230,trig 106,hdl 55,ldl 154  1/13 chol 224,trig 109,hdl 46,ldl 156; 10 yr risk 4%  2/28/14 chol 226,trig 127,hdl 64,ldl 137  12/19/14 chol 205,trig 111,hdl 51,ldl 132  4/23/15 chol 212,trig 106,hdl 58,ldl 133  12/24/15 chol 212,trig 56,hdl 62,ldl 139  12/27/16 chol 231,trig 72,hdl 65,ldl 152  12/29/17 chol 208,trig 70,hdl 63,ldl 131  12/28/18 chol 219,trig 199,hdl 57,ldl 122     Deviated nasal septum     decrease gfr  12/23/15 bun 13,creat 0.87,GFR>60  3/16/16 bun 18,creat 1.07,GFR>60  7/6/16 bun 20,creat 0.9,GFR 52  10/5/16 bun 14,creat 0.8,GFR 59  12/27/16 bun 17,crat 0.9,GFR>60  12/29/17 bun 17,creat 1.0,GFR>60  12/29/17 bun 17,creat 0.99,GFR 57  12/28/18 bun 13,creat 0.7,GFR>60     Bunion  Sees podiatry   12/11 podiatry note  1/13 bunion surgery     Patient Active Problem List   Diagnosis   • Hypothyroid   • Preventative health care   • Bunion of great toe of left foot   • Deviated nasal septum   • Dyslipidemia   • Sleep apnea   • History of allergic rhinitis   • Skin lesion   • Family history of breast cancer in sister   • Macular degeneration   • Obesity   • Microscopic hematuria   • Lymphoma (HCC)   • Decreased GFR   • Weight gain     Depression Screening    Little interest or pleasure in doing things?  0 - not at all  Feeling down, depressed , or hopeless? 0 - not at all  Patient Health Questionnaire Score: 0            Health Maintenance Summary                IMM ZOSTER VACCINES Overdue 10/28/2014      Done 9/2/2014 Imm Admin: Zoster Vaccine Live (ZVL) (Zostavax)    IMM PNEUMOCOCCAL 19-64 (ADULT) HIGHEST RISK SERIES Overdue 12/22/2017      Done 12/22/2016 Imm Admin: Pneumococcal polysaccharide vaccine (PPSV-23)    PAP SMEAR Next Due 2/23/2020      Done 2/23/2017      Patient has more history with this topic...    MAMMOGRAM Next Due  "3/13/2020      Done 3/13/2019 [id number]     Patient has more history with this topic...    COLONOSCOPY Next Due 11/17/2020      Done 11/17/2010 AMB REFERRAL TO GI FOR COLONOSCOPY    IMM DTaP/Tdap/Td Vaccine Next Due 12/9/2021      Done 12/9/2011 Imm Admin: Tdap Vaccine          Patient Care Team:  Kenneth Reinoso M.D. as PCP - General  [employer] as Respiratory (DME Supplier)    ROS       Objective:     /76 (BP Location: Right arm, Patient Position: Sitting, BP Cuff Size: Adult)   Pulse 82   Temp 36.4 °C (97.6 °F)   Ht 1.6 m (5' 3\")   Wt 91.2 kg (201 lb)   SpO2 93%   BMI 35.61 kg/m²      Physical Exam   Constitutional: She appears well-developed and well-nourished. No distress.   HENT:   Head: Normocephalic and atraumatic.   Right Ear: External ear normal.   Left Ear: External ear normal.   Nose: Nose normal.   Mouth/Throat: Oropharynx is clear and moist. No oropharyngeal exudate.   Eyes: Conjunctivae are normal. Right eye exhibits no discharge. Left eye exhibits no discharge. No scleral icterus.   Neck: Neck supple. No JVD present. No thyromegaly present.   Cardiovascular: Normal rate and regular rhythm.    Pulmonary/Chest: Effort normal and breath sounds normal. No respiratory distress.   Abdominal: She exhibits no distension.   Musculoskeletal: She exhibits no edema.   Lymphadenopathy:     She has no cervical adenopathy.   Neurological: She is alert.   Skin: Skin is warm. She is not diaphoretic. No erythema.   Psychiatric: She has a normal mood and affect. Her behavior is normal.   Nursing note and vitals reviewed.    Cranial nerves intact  No cervical adenopathy, no supraclavicular adenopathy, no occipital adenopathy  Posterior scalp examined no evidence of cyst or nodule     No spinal tenderness  Normal strength lower extremities, no edema, normal range of motion lower extremities no pain with palpation to thigh muscles, calf muscles, normal range of motion knees and ankles bilateral   "   Assessment/Plan:     Assessment  #1 question resolved cyst posterior right occipital region, no evidence of lymphoma recurrence, no evidence of infectious process, no evidence of malignancy    #2 right thigh muscle spasms with prolonged driving likely positional, no evidence of sciatica, peripheral vascular disease, spinal stenosis normal strength on physical exam    #3 obesity BMI 35.6 followed by weight management program    #4 hypothyroid on replacement most recent TSH therapeutic    Plan  #1 monitor for cyst recurrence, reassurance provided    #2 follow-up oncology    #3 adjust seating arrangement in car, stretching, regular exercise recommended call if symptoms recur or persist    #4 follow weight management, nutrition, diet, exercise discussed, obesity increases risk of diabetes and heart disease    #5 follow-up 6 months

## 2019-08-07 ENCOUNTER — HOSPITAL ENCOUNTER (OUTPATIENT)
Dept: LAB | Facility: MEDICAL CENTER | Age: 64
End: 2019-08-07
Attending: INTERNAL MEDICINE
Payer: COMMERCIAL

## 2019-08-07 LAB
ALBUMIN SERPL BCP-MCNC: 3.8 G/DL (ref 3.2–4.9)
ALBUMIN/GLOB SERPL: 1.3 G/DL
ALP SERPL-CCNC: 71 U/L (ref 30–99)
ALT SERPL-CCNC: 14 U/L (ref 2–50)
ANION GAP SERPL CALC-SCNC: 6 MMOL/L (ref 0–11.9)
AST SERPL-CCNC: 16 U/L (ref 12–45)
BASOPHILS # BLD AUTO: 0.8 % (ref 0–1.8)
BASOPHILS # BLD: 0.06 K/UL (ref 0–0.12)
BILIRUB SERPL-MCNC: 0.4 MG/DL (ref 0.1–1.5)
BUN SERPL-MCNC: 13 MG/DL (ref 8–22)
CALCIUM SERPL-MCNC: 8.9 MG/DL (ref 8.5–10.5)
CHLORIDE SERPL-SCNC: 107 MMOL/L (ref 96–112)
CO2 SERPL-SCNC: 28 MMOL/L (ref 20–33)
CREAT SERPL-MCNC: 0.87 MG/DL (ref 0.5–1.4)
EOSINOPHIL # BLD AUTO: 0.92 K/UL (ref 0–0.51)
EOSINOPHIL NFR BLD: 11.5 % (ref 0–6.9)
ERYTHROCYTE [DISTWIDTH] IN BLOOD BY AUTOMATED COUNT: 46.5 FL (ref 35.9–50)
FASTING STATUS PATIENT QL REPORTED: NORMAL
GLOBULIN SER CALC-MCNC: 2.9 G/DL (ref 1.9–3.5)
GLUCOSE SERPL-MCNC: 95 MG/DL (ref 65–99)
HCT VFR BLD AUTO: 43.3 % (ref 37–47)
HGB BLD-MCNC: 13.2 G/DL (ref 12–16)
IMM GRANULOCYTES # BLD AUTO: 0.01 K/UL (ref 0–0.11)
IMM GRANULOCYTES NFR BLD AUTO: 0.1 % (ref 0–0.9)
LDH SERPL L TO P-CCNC: 172 U/L (ref 107–266)
LYMPHOCYTES # BLD AUTO: 2.09 K/UL (ref 1–4.8)
LYMPHOCYTES NFR BLD: 26.2 % (ref 22–41)
MCH RBC QN AUTO: 29.1 PG (ref 27–33)
MCHC RBC AUTO-ENTMCNC: 30.5 G/DL (ref 33.6–35)
MCV RBC AUTO: 95.6 FL (ref 81.4–97.8)
MONOCYTES # BLD AUTO: 0.55 K/UL (ref 0–0.85)
MONOCYTES NFR BLD AUTO: 6.9 % (ref 0–13.4)
NEUTROPHILS # BLD AUTO: 4.34 K/UL (ref 2–7.15)
NEUTROPHILS NFR BLD: 54.5 % (ref 44–72)
NRBC # BLD AUTO: 0 K/UL
NRBC BLD-RTO: 0 /100 WBC
PLATELET # BLD AUTO: 229 K/UL (ref 164–446)
PMV BLD AUTO: 11.1 FL (ref 9–12.9)
POTASSIUM SERPL-SCNC: 3.8 MMOL/L (ref 3.6–5.5)
PROT SERPL-MCNC: 6.7 G/DL (ref 6–8.2)
RBC # BLD AUTO: 4.53 M/UL (ref 4.2–5.4)
SODIUM SERPL-SCNC: 141 MMOL/L (ref 135–145)
WBC # BLD AUTO: 8 K/UL (ref 4.8–10.8)

## 2019-08-07 PROCEDURE — 85025 COMPLETE CBC W/AUTO DIFF WBC: CPT

## 2019-08-07 PROCEDURE — 80053 COMPREHEN METABOLIC PANEL: CPT

## 2019-08-07 PROCEDURE — 36415 COLL VENOUS BLD VENIPUNCTURE: CPT

## 2019-08-07 PROCEDURE — 83615 LACTATE (LD) (LDH) ENZYME: CPT

## 2019-09-03 ENCOUNTER — IMMUNIZATION (OUTPATIENT)
Dept: SOCIAL WORK | Facility: CLINIC | Age: 64
End: 2019-09-03
Payer: COMMERCIAL

## 2019-09-03 DIAGNOSIS — Z23 NEED FOR VACCINATION: ICD-10-CM

## 2019-09-03 PROCEDURE — 90686 IIV4 VACC NO PRSV 0.5 ML IM: CPT | Performed by: REGISTERED NURSE

## 2019-10-21 ENCOUNTER — SLEEP CENTER VISIT (OUTPATIENT)
Dept: SLEEP MEDICINE | Facility: MEDICAL CENTER | Age: 64
End: 2019-10-21
Payer: COMMERCIAL

## 2019-10-21 VITALS
HEART RATE: 78 BPM | WEIGHT: 207 LBS | RESPIRATION RATE: 15 BRPM | DIASTOLIC BLOOD PRESSURE: 80 MMHG | BODY MASS INDEX: 36.68 KG/M2 | OXYGEN SATURATION: 90 % | SYSTOLIC BLOOD PRESSURE: 122 MMHG | HEIGHT: 63 IN

## 2019-10-21 DIAGNOSIS — G47.33 OBSTRUCTIVE SLEEP APNEA SYNDROME: Chronic | ICD-10-CM

## 2019-10-21 PROCEDURE — 99213 OFFICE O/P EST LOW 20 MIN: CPT | Performed by: FAMILY MEDICINE

## 2019-10-21 NOTE — PROGRESS NOTES
[facility] Follow Up Note     Date: 10/21/2019 / Time: 2:20 PM    Patient ID:   Name:             Mari Kinsey   YOB: 1955  Age:                 63 y.o.  female   MRN:               [id number]      Thank you for requesting a sleep medicine consultation on Mari Kinsey at the sleep center. She presents today with the chief complaints of ALLEN follow up.     HISTORY OF PRESENT ILLNESS:       Pt is currently on CPAp 9 cm. She goes to sleep around 10:30 pm and wakes up around 6:30 am. She is getting about 8 hrs of sleep on a good night. Overall,  She does finds her sleep refreshing.  She occasionally has trouble maintaining sleep since her last visit.      She is using CPAP most days of the week. Pt reports 7 hrs of average nightly use of CPAP. Pt denies snoring, gasping,choking.Pt also denies significant mask leak that is interfering with sleep. The 30 day compliance was downloaded which shows adequate compliance with more that 4 hr usage about 97%. The AHI is has improved to 0.6/hr. The mask leak is normal. The symptoms of ALLEN has improved.         SLEEP HISTORY    PSG from 1/2011 indicated an AHI of 28.3 and low oxygen of 83%.       REVIEW OF SYSTEMS:       Constitutional: Denies fevers, Denies weight changes  Eyes: Denies changes in vision, no eye pain  Ears/Nose/Throat/Mouth: Denies nasal congestion or sore throat   Cardiovascular: Denies chest pain or palpitations   Respiratory: Denies shortness of breath , Denies cough  Gastrointestinal/Hepatic: Denies abdominal pain, nause  Musculoskeletal/Rheum: Denies  joint pain and swelling   Skin/Breast: Denies rash,   Neurological: Denies headache, confusion, memory loss or focal weakness/parasthesias  Psychiatric: denies mood disorder   Sleep: denies snoring     Comprehensive review of systems form is reviewed with the patient and is attached in the EMR.     PMH:  has a past medical history of Chickenpox, Dental disorder, Congolese  "measles, Hypothyroidism, Influenza, Mumps, ALLEN (obstructive sleep apnea), Sleep apnea, Snoring, Thyroid disease, and Tonsillitis.  MEDS:   Current Outpatient Medications:   •  Multiple Vitamins-Minerals (PRESERVISION AREDS 2 PO), Take  by mouth., Disp: , Rfl:   •  levothyroxine (SYNTHROID) 137 MCG Tab, Take 1 Tab by mouth Every morning on an empty stomach., Disp: 90 Tab, Rfl: 2  •  vitamin D (CHOLECALCIFEROL) 1000 UNIT Tab, Take 2,000 Units by mouth every day., Disp: , Rfl:   •  B Complex Vitamins (B COMPLEX PO), Take  by mouth., Disp: , Rfl:   ALLERGIES:   Allergies   Allergen Reactions   • Penicillins Rash     SURGHX:   Past Surgical History:   Procedure Laterality Date   • KALE BY LAPAROSCOPY  2/3/2016    Procedure: KALE BY LAPAROSCOPY;  Surgeon: Tricia Farah M.D.;  Location: SURGERY SAME DAY [facility] ORS;  Service:    • LYMPH NODE SAMPLING  2/3/2016    Procedure: LYMPH NODE SAMPLING EXCISION, BIOPSY;  Surgeon: Tricia Farah M.D.;  Location: SURGERY SAME DAY [facility] ORS;  Service:    • BONE GRAFT      at 19 years of age   • BUNIONECTOMY     • HYSTEROSCOPY WITH VIDEO DIAGNOSTIC     • NASAL SEPTAL RECONSTRUCTION     • OTHER      bone graft to ;hand   • TONSILLECTOMY       SOCHX:  reports that she is a non-smoker but has been exposed to tobacco smoke. She has never used smokeless tobacco. She reports that she does not drink alcohol or use drugs..  FH:   Family History   Problem Relation Age of Onset   • Heart Disease Father    • Arthritis Mother         osteoporosis   • Sleep Apnea Neg Hx          Physical Exam:  Vitals/ General Appearance:   Weight/BMI: Body mass index is 36.67 kg/m².  /80 (BP Location: Right arm, Patient Position: Sitting, BP Cuff Size: Adult)   Pulse 78   Resp 15   Ht 1.6 m (5' 3\")   Wt 93.9 kg (207 lb)   SpO2 90%   Vitals:    10/21/19 1411   BP: 122/80   BP Location: Right arm   Patient Position: Sitting   BP Cuff Size: Adult   Pulse: 78   Resp: 15   SpO2: 90%   Weight: 93.9 " "kg (207 lb)   Height: 1.6 m (5' 3\")       Pt. is alert and oriented to time, place and person. Cooperative and in no apparent distress.       -Head: Atraumatic, normocephalic.    -. Throat: Oropharynx appears crowded in that the palate is overhanging  pharynx not inflamed.   -. Neck: Supple. No thyromegaly  -. Chest: Trachea central, no spine deformity   -. Lungs auscultation: B/L good air entry, vesicular breath sounds, no adventitious sounds  -. Heart auscultation: 1st and 2nd heart sounds normal, regular rhythm. No appreciable murmur.  - Extremities: , no pedal edema.  - Skin: No rash  - NEUROLOGICAL EXAMINATION: On neurological exam, the patient was alert and oriented x3. speech was clear and fluent without dysarthria.      ASSESSMENT AND PLAN     1. Sleep Apnea    The pathophysiology of sleep anea and the increased risk of cardiovascular morbidity from untreated sleep apnea is discussed in detail with the patient.    She is urged to avoid supine sleep, weight gain and alcoholic beverages since all of these can worsen sleep apnea. She is cautioned against drowsy driving. If She feels sleepy while driving, She must pull over for a break/nap, rather than persist on the road, in the interest of She own safety and that of others on the road.   Plan   - Continue CPAP at 9 cm with FFM   - compliance download was reviewed and discussed with the pt   - compliance was reinforced     2. Regarding treatment of other past medical problems and general health maintenance,  She is urged to follow up with PCP.      "

## 2019-12-12 ENCOUNTER — APPOINTMENT (RX ONLY)
Dept: URBAN - METROPOLITAN AREA CLINIC 35 | Facility: CLINIC | Age: 64
Setting detail: DERMATOLOGY
End: 2019-12-12

## 2019-12-12 DIAGNOSIS — L81.4 OTHER MELANIN HYPERPIGMENTATION: ICD-10-CM

## 2019-12-12 DIAGNOSIS — D22 MELANOCYTIC NEVI: ICD-10-CM

## 2019-12-12 DIAGNOSIS — L82.1 OTHER SEBORRHEIC KERATOSIS: ICD-10-CM

## 2019-12-12 DIAGNOSIS — Z71.89 OTHER SPECIFIED COUNSELING: ICD-10-CM

## 2019-12-12 DIAGNOSIS — Z87.2 PERSONAL HISTORY OF DISEASES OF THE SKIN AND SUBCUTANEOUS TISSUE: ICD-10-CM

## 2019-12-12 PROBLEM — D22.5 MELANOCYTIC NEVI OF TRUNK: Status: ACTIVE | Noted: 2019-12-12

## 2019-12-12 PROCEDURE — 99213 OFFICE O/P EST LOW 20 MIN: CPT

## 2019-12-12 PROCEDURE — ? COUNSELING

## 2019-12-12 ASSESSMENT — LOCATION SIMPLE DESCRIPTION DERM
LOCATION SIMPLE: RIGHT FOREARM
LOCATION SIMPLE: RIGHT UPPER BACK
LOCATION SIMPLE: ABDOMEN
LOCATION SIMPLE: RIGHT SHOULDER
LOCATION SIMPLE: RIGHT PRETIBIAL REGION
LOCATION SIMPLE: LEFT UPPER BACK
LOCATION SIMPLE: LEFT FOREARM
LOCATION SIMPLE: LEFT PRETIBIAL REGION
LOCATION SIMPLE: LEFT LOWER BACK
LOCATION SIMPLE: LEFT SHOULDER

## 2019-12-12 ASSESSMENT — LOCATION DETAILED DESCRIPTION DERM
LOCATION DETAILED: LEFT SUPERIOR MEDIAL MIDBACK
LOCATION DETAILED: RIGHT DISTAL DORSAL FOREARM
LOCATION DETAILED: RIGHT DISTAL PRETIBIAL REGION
LOCATION DETAILED: RIGHT SUPERIOR UPPER BACK
LOCATION DETAILED: LEFT DISTAL DORSAL FOREARM
LOCATION DETAILED: LEFT MEDIAL UPPER BACK
LOCATION DETAILED: EPIGASTRIC SKIN
LOCATION DETAILED: LEFT DISTAL PRETIBIAL REGION
LOCATION DETAILED: RIGHT POSTERIOR SHOULDER
LOCATION DETAILED: LEFT POSTERIOR SHOULDER

## 2019-12-12 ASSESSMENT — LOCATION ZONE DERM
LOCATION ZONE: ARM
LOCATION ZONE: TRUNK
LOCATION ZONE: LEG

## 2019-12-23 ENCOUNTER — TELEPHONE (OUTPATIENT)
Dept: MEDICAL GROUP | Facility: MEDICAL CENTER | Age: 64
End: 2019-12-23

## 2019-12-23 ENCOUNTER — OFFICE VISIT (OUTPATIENT)
Dept: MEDICAL GROUP | Facility: MEDICAL CENTER | Age: 64
End: 2019-12-23
Payer: COMMERCIAL

## 2019-12-23 VITALS
HEIGHT: 63 IN | HEART RATE: 69 BPM | TEMPERATURE: 97.6 F | WEIGHT: 207.8 LBS | SYSTOLIC BLOOD PRESSURE: 136 MMHG | BODY MASS INDEX: 36.82 KG/M2 | DIASTOLIC BLOOD PRESSURE: 84 MMHG | OXYGEN SATURATION: 95 %

## 2019-12-23 DIAGNOSIS — Z00.00 PREVENTATIVE HEALTH CARE: Chronic | ICD-10-CM

## 2019-12-23 DIAGNOSIS — E66.9 CLASS 1 OBESITY WITHOUT SERIOUS COMORBIDITY IN ADULT, UNSPECIFIED BMI, UNSPECIFIED OBESITY TYPE: ICD-10-CM

## 2019-12-23 DIAGNOSIS — Z87.09 HISTORY OF ALLERGIC RHINITIS: Chronic | ICD-10-CM

## 2019-12-23 DIAGNOSIS — Z12.11 COLON CANCER SCREENING: ICD-10-CM

## 2019-12-23 PROCEDURE — 99396 PREV VISIT EST AGE 40-64: CPT | Performed by: INTERNAL MEDICINE

## 2019-12-23 NOTE — PROGRESS NOTES
Subjective:      Mari Kinsey is a 64 y.o. female who presents annual         HPI   Here for annual medication, allergies, medical history, surgical history, social history, family history reviewed and updated  Sees pulmonary for sleep apnea, remains on CPAP most recent pulmonary visit 10/21/19  Sees oncology history of lymphoma  Sees gynecology for Pap smear  Sees eye [facility] once per year   Sees dermatology for annual skin check, uses sunscreen spf 30  Teeth cleaning twice per year  Joined diabetes prevention program   SH , goes to [employer], drinks water, green tea, no sodas, does walking as well 7 to 8000 steps per day, unemployed          Current Outpatient Medications   Medication Sig Dispense Refill   • levothyroxine (SYNTHROID) 137 MCG Tab Take 1 Tab by mouth Every morning on an empty stomach. 90 Tab 0   • Multiple Vitamins-Minerals (PRESERVISION AREDS 2 PO) Take  by mouth.     • B Complex Vitamins (B COMPLEX PO) Take  by mouth.     • vitamin D (CHOLECALCIFEROL) 1000 UNIT Tab Take 2,000 Units by mouth every day.       No current facility-administered medications for this visit.           Sleep apnea  2/11 PMA sleep study moderate obstructive sleep apnea syndrome with apnea-hypotony index 28, low saturation 83%, successful CPAP titration final pressure 9 cm with improvement in apnea-hypotony index to 1.5 with low saturation 91%  3/11 PMA note on cpap 9  3/12 PMA note on cpap 9  12/12 off cpap  12/14 back on cpap 9  12/20/15 on cpap 9  10/10/17sleep note continue cpap 9  11/20/17 echo normal LV size and function, EF 70%, mild tricuspid regurgitation, RVSP 35  10/19/18 sleep note on cpap 9  10/21/19 sleep note on cpap 9      Skin lesion followed by dermatology Dr. Mondragon     Preventative health  10/17/10 GIC colon negative repeat 10 yrs  12/9/11 tdap   9/2/14 zoster vaccine at [email or web address]  11/25/16 mammogram done [facility] negative  12/22/16 pneumovax  12/29/16 dexa LS  -0.7,hip +0.8  2/23/17 pap per  gyn   8/4/17 completed hep b vaccine series   12/20/18 shongrix provided to get at pharmacy  12/28/18 vit d 15 start 4000 units daily  1/18/19 FIT negative  3/13/19 mammogram done [facility] negative stable benign 6 mm left 2:00 nodule     Microscopic hematuria  12/19/14 UA 2-5 RBC repeat in 1 month  1/21/15 UA trace blood   1/27/15 ultrasound renal negative  2/15 drop of urine one more time, send out UA evaluate microscopic hematuria , if still positive for urology  2/27/15 UA positive blood refer to urology  4/4/15 CT abdomen and pelvis with and without; retroperitoneal periaortic pericaval and retrocrural adenopathy, mesenteric adenopathy, no hydronephrosis or urolithiasis  10/16/15 urology note microscopic hematuria workup, CT urogram negative, cystoscopy bladder neck inflammation with cytology negative, hematuria has resolved; followup 6 months  4/13/16 [place]rology [place] note UA and cytology  12/28/18 UA negative     Macular degeneration  Sees   12/20/18 referral to   2/13/19  eye exam macular degeneration, continue antioxidants and areds formula     Lymphoma  4/4/15 CT abdomen and pelvis with and without; retroperitoneal periaortic pericaval and retrocrural adenopathy, mesenteric adenopathy, no hydronephrosis or urolithiasis  12/11/15 ,,alk phos 170,bili 1.2   12/20/15 ultrasound liver pending, repeat liver enzymes and secondary workup pending, CT abdomen and pelvis ordered at urgent care pending follow-up lymphadenopathy  12/24/15 ultrasound abdomen and biliary tree, prominent liver size, no focal mass, gallbladder contains numerous mobile stones, no thickening or pericholecystic fluid is noted, maximum diameter common bile duct 9.8 which is enlarged, 2.6 x 2.2 x 2.6 cm nodule hypoechoic area in the pancreatic body not well visualized because of gas  12/24/15 AST 34,ALT 76,GGT 76.alk phos 88,bili 0.5,acute hep panel negative,  iron 45,%sat 13,ferritin 67,AMA 22 (20-25 equivocal),F-actin Ab IgG negative,RE neg,SPEP neg  12/26/15 CT with contrast pending follow-up adenopathy on previous CT and possible pancreatic lesion on ultrasound, referral to surgery for cholelithiasis.  1/12/16  surgery scheduled for laparoscopic cholecystectomy, laparoscopic biopsy of lymph nodes, conversion to open procedure to complete biopsy if necessary to rule out lymphoma  2/3/16 laparoscopic mesenteric lymph node biopsy limited sampling, partial involvement follicular lymphoma B-cell origin favor low-grade 1-2/3, flow cytometry CD10 monoclonal B cells, demonstrating lambda light chain expression and call expression of CD10 with CD19, CD20, negative for CD5 and CD43  2/19/16 CT/PET hypermetabolic activity within retrocrural, periaortic, aortocaval space, proximal pelvic adenopathy within the left common iliac chain, hypermetabolic adenopathy within mesentery  3/16/16  oncology consultation; 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 consistent with low-grade non-hodgkin's lymphoma follicular-type CD10 and CD20 positive, likely low-grade apparently indolent course, most likely will need observation, recommend complete workup including bone marrow biopsy, LDH, CMP,and once complete will calculate FLPI score  4/6/16  oncology note wbc 4.6,hgb 13.9,hct 43,plt 240; stage IIIa follicular lymphoma low-grade, recommend observation follow-up every 3 months first year and then every 6 months, CBC, CMP, LDH, beta-2 microglobulin and imaging studies as indicated, follow up 3 months  7/6/16  oncology note, wbc 7.7,hgb 13.6,hct 40,plt 304, ,beta 2 microglobulin 2.5 (1.1-2.4),uric 5.7;follow up 3 months  10/5/16  oncology note clinically no signs of recurrence, repeat CT chest, abdomen, pelvis in january, CMP, LDH today, follow-up 3 months  12/27/16 wbc 7.7 (49%N,30%L)  12/29/16 CT abdomen and pelvis  with; interval resolution of retrocrural, para-aortic, paracaval, and mesenteric adenopathy, no adenopathy appreciated in pelvis, stable 2 cm or less diameter nodules right middle lobe  1/17/17  oncology note, doing well with recent CT showing no evidence of adenopathy, repeat labs prior to next visit CBC, CMP, LDH, beta-2 microglobulin, follow-up 6 months  7/12/17 cbc,cmp normal,,beta 2 microglobulin  7/18/17  oncology note no evidence disease progression  12/29/17 wbc 4.3 (61%N,18%L)  1/16/18  oncology note no evidence of disease progression, recent labs cbc,cmp,ldh normal, follow-up 6 months  7/30/18  oncology note stable no recurrence of disease, labs ordered  1/28/19  oncology note repeat labs follow up 6 months  8/7/19 wbc 8.0,hgb 13,hct 43,,cmp negative  8/14/19  oncology note no clinical symptoms, laboratory work reviewed, follow-up 1 year     Hypothyroid  9/10 tsh 1.9 on levothyroxine 112 mcg  12/9/11 tsh 4.1 on levothyroxine 112 mcg; increase to levothyroxine 125 mcg  2/12 tsh 2.4 cont levothyroxine 125 mcg  1/13 tsh 3.5 on levothyroxine 125 mcg  2/28/14 tsh 3.9 on levothyroxine 125 mcg  12/19/14 tsh 3.8 on levothyroxine 125 mcg  12/24/15 tsh 2.0 on levothyroxine 125 mcg  12/22/16 tsh 3.3 on levothyroxine 125 mcg   12/29/17 tsh 2.6 on levothyroxine 125 mcg  12/28/18 tsh 6.5 on levothyroxine 125 mcg, change to levothyroxine 137 mcg, repeat tsh 6 weeks  2/14/19 tsh 2.8 on levothyroxine 137 mcg      History allergic rhinitis  2/12  allergy note skin testing positive reactions trees, grasses, weeds,cat and dog, molds, dust mite  12/12 allergy shots per  allergist; off flonase  12/13 allergy shots  every 2-3 weeks  12/20/15 sees  once a month allergy shots  12/23/19 monthly shots per      Dyslipidemia  9/10 chol 222,trig 144,hdl 48,ldl 144  12/9/11 chol 230,trig 106,hdl 55,ldl 154  1/13 chol  224,trig 109,hdl 46,ldl 156; 10 yr risk 4%  2/28/14 chol 226,trig 127,hdl 64,ldl 137  12/19/14 chol 205,trig 111,hdl 51,ldl 132  4/23/15 chol 212,trig 106,hdl 58,ldl 133  12/24/15 chol 212,trig 56,hdl 62,ldl 139  12/27/16 chol 231,trig 72,hdl 65,ldl 152  12/29/17 chol 208,trig 70,hdl 63,ldl 131  12/28/18 chol 219,trig 199,hdl 57,ldl 122     Deviated nasal septum     decrease gfr  12/23/15 bun 13,creat 0.87,GFR>60  3/16/16 bun 18,creat 1.07,GFR>60  7/6/16 bun 20,creat 0.9,GFR 52  10/5/16 bun 14,creat 0.8,GFR 59  12/27/16 bun 17,crat 0.9,GFR>60  12/29/17 bun 17,creat 1.0,GFR>60  12/29/17 bun 17,creat 0.99,GFR 57  12/28/18 bun 13,creat 0.7,GFR>60     Bunion  Sees podiatry   12/11 podiatry note  1/13 bunion surgery           Patient Active Problem List   Diagnosis   • Hypothyroid   • Preventative health care   • Bunion of great toe of left foot   • Deviated nasal septum   • Dyslipidemia   • Sleep apnea   • History of allergic rhinitis   • Skin lesion   • Family history of breast cancer in sister   • Macular degeneration   • Obesity   • Microscopic hematuria   • Lymphoma (HCC)   • Decreased GFR              Health Maintenance Summary                PAP SMEAR Next Due 2/23/2020      Done 2/23/2017      Patient has more history with this topic...    MAMMOGRAM Next Due 3/13/2020      Done 3/13/2019 [id number]     Patient has more history with this topic...    COLONOSCOPY Next Due 11/17/2020      Done 11/17/2010 AMB REFERRAL TO GI FOR COLONOSCOPY    IMM DTaP/Tdap/Td Vaccine Next Due 12/9/2021      Done 12/9/2011 Imm Admin: Tdap Vaccine          Patient Care Team:  Kenneth Reinoso M.D. as PCP - General  [employer] (DME Supplier)    ROS   No fevers, chills, night sweats, weight loss  No chest pain, palpitation, shortness of breath with activity  No reflux and change in eating habits  No joint pains with activity       Objective:         Physical Exam  Vitals signs and nursing note  reviewed.   Constitutional:       Appearance: Normal appearance.   HENT:      Head: Normocephalic and atraumatic.      Right Ear: Tympanic membrane, ear canal and external ear normal.      Left Ear: Tympanic membrane, ear canal and external ear normal.      Nose: No congestion.      Mouth/Throat:      Pharynx: No oropharyngeal exudate or posterior oropharyngeal erythema.   Eyes:      General:         Right eye: No discharge.         Left eye: No discharge.   Neck:      Musculoskeletal: Neck supple.   Cardiovascular:      Rate and Rhythm: Normal rate and regular rhythm.      Heart sounds: Normal heart sounds. No murmur.   Pulmonary:      Effort: Pulmonary effort is normal. No respiratory distress.      Breath sounds: Normal breath sounds.   Abdominal:      General: There is no distension.      Palpations: Abdomen is soft. There is no mass.   Musculoskeletal:         General: No swelling.   Skin:     General: Skin is warm.   Neurological:      General: No focal deficit present.      Mental Status: She is alert.   Psychiatric:         Mood and Affect: Mood normal.                 Assessment/Plan:     Assessment  #! Annual    #2 lymphoma stable, no clinical evidence of disease 8/14/19  oncology note no clinical symptoms, laboratory work reviewed, follow-up 1 year     #3 hypothyroid 2/14/19 tsh 2.8 on levothyroxine 137 mcg      #4 history allergic rhinitis still does allergy shots      #5 dyslipidemia 12/28/18 chol 219,trig 199,hdl 57,ldl 122 diet controlled    #6 sLeep apnea 10/21/19 sleep note on cpap 9      #7 macular degeneration followed by Dr. Malave ophthalmology     #8 skin lesion followed by dermatology Dr. Mondragon uses SPF     #9 preventative health  10/17/10 [facility]IC colon negative repeat 10 yrs  12/9/11 tdap   9/2/14 [facility]  11/25/16 mammogram done [facility] negative  12/22/16 pneumovax  12/29/16 dexa LS -0.7,hip +0.8  2/23/17 pap per  gyn   8/4/17 completed hep b  vaccine series   12/28/18 vit d 15 start 4000 units daily  1/18/19 FIT negative  3/13/19 mammogram done [facility] negative stable benign 6 mm left 2:00 nodule     #10 BMI 36.8 sees nutritionist program once per week and goes to [employer]        Plan  #1 health maintenance reviewed and updated    #2 labs    #3 follow-up oncology    #4 follow-up sleep apnea clinic continue CPAP, using at least 6 hours at night, with weight loss she may be able to discontinue the CPAP    #5 follow dermatology use sunscreen    #6 Pap per gynecology      #7 had shingrix at [facility] first and second    #8 mammogram at [place]     #9 prevnar at age 66    #!0 follow up diabetes prevention program, continue to work on nutrition, diet, exercise, importance of weight loss emphasized as obesity increases risk for diabetes and heart disease    #11 follow-up 1 year    #12 FIT, due for colonoscopy in October

## 2019-12-31 ENCOUNTER — HOSPITAL ENCOUNTER (OUTPATIENT)
Dept: LAB | Facility: MEDICAL CENTER | Age: 64
End: 2019-12-31
Attending: INTERNAL MEDICINE
Payer: COMMERCIAL

## 2019-12-31 DIAGNOSIS — Z00.00 PREVENTATIVE HEALTH CARE: Chronic | ICD-10-CM

## 2019-12-31 LAB
25(OH)D3 SERPL-MCNC: 25 NG/ML (ref 30–100)
ALBUMIN SERPL BCP-MCNC: 4 G/DL (ref 3.2–4.9)
ALBUMIN/GLOB SERPL: 1.2 G/DL
ALP SERPL-CCNC: 70 U/L (ref 30–99)
ALT SERPL-CCNC: 19 U/L (ref 2–50)
ANION GAP SERPL CALC-SCNC: 5 MMOL/L (ref 0–11.9)
APPEARANCE UR: CLEAR
AST SERPL-CCNC: 19 U/L (ref 12–45)
BACTERIA #/AREA URNS HPF: NEGATIVE /HPF
BASOPHILS # BLD AUTO: 0.5 % (ref 0–1.8)
BASOPHILS # BLD: 0.04 K/UL (ref 0–0.12)
BILIRUB SERPL-MCNC: 0.5 MG/DL (ref 0.1–1.5)
BILIRUB UR QL STRIP.AUTO: NEGATIVE
BUN SERPL-MCNC: 15 MG/DL (ref 8–22)
CALCIUM SERPL-MCNC: 9.3 MG/DL (ref 8.5–10.5)
CHLORIDE SERPL-SCNC: 103 MMOL/L (ref 96–112)
CHOLEST SERPL-MCNC: 221 MG/DL (ref 100–199)
CO2 SERPL-SCNC: 29 MMOL/L (ref 20–33)
COLOR UR: YELLOW
CREAT SERPL-MCNC: 0.93 MG/DL (ref 0.5–1.4)
EOSINOPHIL # BLD AUTO: 0.77 K/UL (ref 0–0.51)
EOSINOPHIL NFR BLD: 9.6 % (ref 0–6.9)
EPI CELLS #/AREA URNS HPF: NEGATIVE /HPF
ERYTHROCYTE [DISTWIDTH] IN BLOOD BY AUTOMATED COUNT: 46.8 FL (ref 35.9–50)
EST. AVERAGE GLUCOSE BLD GHB EST-MCNC: 117 MG/DL
FASTING STATUS PATIENT QL REPORTED: NORMAL
GLOBULIN SER CALC-MCNC: 3.4 G/DL (ref 1.9–3.5)
GLUCOSE SERPL-MCNC: 89 MG/DL (ref 65–99)
GLUCOSE UR STRIP.AUTO-MCNC: NEGATIVE MG/DL
HBA1C MFR BLD: 5.7 % (ref 0–5.6)
HCT VFR BLD AUTO: 43 % (ref 37–47)
HDLC SERPL-MCNC: 57 MG/DL
HGB BLD-MCNC: 13.7 G/DL (ref 12–16)
HYALINE CASTS #/AREA URNS LPF: NORMAL /LPF
IMM GRANULOCYTES # BLD AUTO: 0.02 K/UL (ref 0–0.11)
IMM GRANULOCYTES NFR BLD AUTO: 0.2 % (ref 0–0.9)
KETONES UR STRIP.AUTO-MCNC: NEGATIVE MG/DL
LDLC SERPL CALC-MCNC: 128 MG/DL
LEUKOCYTE ESTERASE UR QL STRIP.AUTO: ABNORMAL
LYMPHOCYTES # BLD AUTO: 1.98 K/UL (ref 1–4.8)
LYMPHOCYTES NFR BLD: 24.7 % (ref 22–41)
MCH RBC QN AUTO: 30.6 PG (ref 27–33)
MCHC RBC AUTO-ENTMCNC: 31.9 G/DL (ref 33.6–35)
MCV RBC AUTO: 96 FL (ref 81.4–97.8)
MICRO URNS: ABNORMAL
MONOCYTES # BLD AUTO: 0.67 K/UL (ref 0–0.85)
MONOCYTES NFR BLD AUTO: 8.4 % (ref 0–13.4)
NEUTROPHILS # BLD AUTO: 4.53 K/UL (ref 2–7.15)
NEUTROPHILS NFR BLD: 56.6 % (ref 44–72)
NITRITE UR QL STRIP.AUTO: NEGATIVE
NRBC # BLD AUTO: 0 K/UL
NRBC BLD-RTO: 0 /100 WBC
PH UR STRIP.AUTO: 5.5 [PH] (ref 5–8)
PLATELET # BLD AUTO: 227 K/UL (ref 164–446)
PMV BLD AUTO: 11.2 FL (ref 9–12.9)
POTASSIUM SERPL-SCNC: 4.3 MMOL/L (ref 3.6–5.5)
PROT SERPL-MCNC: 7.4 G/DL (ref 6–8.2)
PROT UR QL STRIP: NEGATIVE MG/DL
RBC # BLD AUTO: 4.48 M/UL (ref 4.2–5.4)
RBC # URNS HPF: NORMAL /HPF
RBC UR QL AUTO: NEGATIVE
SODIUM SERPL-SCNC: 137 MMOL/L (ref 135–145)
SP GR UR STRIP.AUTO: 1.02
TRIGL SERPL-MCNC: 178 MG/DL (ref 0–149)
TSH SERPL DL<=0.005 MIU/L-ACNC: 9.36 UIU/ML (ref 0.38–5.33)
UROBILINOGEN UR STRIP.AUTO-MCNC: 0.2 MG/DL
WBC # BLD AUTO: 8 K/UL (ref 4.8–10.8)
WBC #/AREA URNS HPF: NORMAL /HPF

## 2019-12-31 PROCEDURE — 83036 HEMOGLOBIN GLYCOSYLATED A1C: CPT

## 2019-12-31 PROCEDURE — 36415 COLL VENOUS BLD VENIPUNCTURE: CPT

## 2019-12-31 PROCEDURE — 82306 VITAMIN D 25 HYDROXY: CPT

## 2019-12-31 PROCEDURE — 84443 ASSAY THYROID STIM HORMONE: CPT

## 2019-12-31 PROCEDURE — 80053 COMPREHEN METABOLIC PANEL: CPT

## 2019-12-31 PROCEDURE — 80061 LIPID PANEL: CPT

## 2019-12-31 PROCEDURE — 81001 URINALYSIS AUTO W/SCOPE: CPT

## 2019-12-31 PROCEDURE — 85025 COMPLETE CBC W/AUTO DIFF WBC: CPT

## 2020-01-02 ENCOUNTER — TELEPHONE (OUTPATIENT)
Dept: MEDICAL GROUP | Facility: MEDICAL CENTER | Age: 65
End: 2020-01-02

## 2020-01-03 ENCOUNTER — TELEPHONE (OUTPATIENT)
Dept: MEDICAL GROUP | Facility: MEDICAL CENTER | Age: 65
End: 2020-01-03

## 2020-01-03 NOTE — TELEPHONE ENCOUNTER
----- Message from Kenneth Reinoso M.D. sent at 1/2/2020  7:00 PM PST -----  Called patient and left message, please notify her that her tests show:  (1) total cholesterol is 221, good cholesterol is 57 (goal is above 40), bad cholesterol is 128 (goal is less than 100) no medications are necessary but have her continue to optimize her nutrition and exercise program  (2) her liver function, kidney function, blood sugar test are normal   (3) her vitamin D level is slightly low, vitamin D is important for good bone health and bone strength, have her take an extra 4000 units of vitamin D daily  (4) her thyroid function test is low, is she taking her thyroid medication daily?  If so we will need to increase her dose, please let me know.

## 2020-01-03 NOTE — TELEPHONE ENCOUNTER
Called patient and left message, please notify her that her tests show:  (1) total cholesterol is 221, good cholesterol is 57 (goal is above 40), bad cholesterol is 128 (goal is less than 100) no medications are necessary but have her continue to optimize her nutrition and exercise program  (2) her liver function, kidney function, blood sugar test are normal   (3) her vitamin D level is slightly low, vitamin D is important for good bone health and bone strength, have her take an extra 4000 units of vitamin D daily  (4) her thyroid function test is low, is she taking her thyroid medication daily?  If so we will need to increase her dose, please let me know.

## 2020-01-06 ENCOUNTER — TELEPHONE (OUTPATIENT)
Dept: MEDICAL GROUP | Facility: MEDICAL CENTER | Age: 65
End: 2020-01-06

## 2020-01-06 DIAGNOSIS — E03.8 OTHER SPECIFIED HYPOTHYROIDISM: Chronic | ICD-10-CM

## 2020-01-06 RX ORDER — LEVOTHYROXINE SODIUM 0.15 MG/1
150 TABLET ORAL
Qty: 45 TAB | Refills: 1 | Status: SHIPPED | OUTPATIENT
Start: 2020-01-06 | End: 2020-02-15 | Stop reason: SDUPTHER

## 2020-01-06 NOTE — TELEPHONE ENCOUNTER
Pt notified of results and recommendations.     Pt states that she never misses taking her thyroid medication and will await a call notifying her what you have changed her dose to.

## 2020-01-06 NOTE — TELEPHONE ENCOUNTER
Please notify the patient I will change her to Synthroid 150 mcg, take that once a day, prescription sent to [facility].      We will need to repeat her TSH blood test nonfasting in 6 weeks, that order has been placed.

## 2020-01-28 ENCOUNTER — HOSPITAL ENCOUNTER (OUTPATIENT)
Facility: MEDICAL CENTER | Age: 65
End: 2020-01-28
Attending: INTERNAL MEDICINE
Payer: COMMERCIAL

## 2020-01-28 PROCEDURE — 82274 ASSAY TEST FOR BLOOD FECAL: CPT

## 2020-01-30 ENCOUNTER — TELEPHONE (OUTPATIENT)
Dept: MEDICAL GROUP | Facility: MEDICAL CENTER | Age: 65
End: 2020-01-30

## 2020-01-30 LAB — HEMOCCULT STL QL IA: NEGATIVE

## 2020-01-30 NOTE — TELEPHONE ENCOUNTER
----- Message from Kenneth Reinoso M.D. sent at 1/30/2020 12:53 PM PST -----  Please notify patient that her stool test is negative

## 2020-02-14 ENCOUNTER — HOSPITAL ENCOUNTER (OUTPATIENT)
Dept: LAB | Facility: MEDICAL CENTER | Age: 65
End: 2020-02-14
Attending: INTERNAL MEDICINE
Payer: COMMERCIAL

## 2020-02-14 DIAGNOSIS — E03.8 OTHER SPECIFIED HYPOTHYROIDISM: Chronic | ICD-10-CM

## 2020-02-14 LAB — TSH SERPL DL<=0.005 MIU/L-ACNC: 2.41 UIU/ML (ref 0.38–5.33)

## 2020-02-14 PROCEDURE — 84443 ASSAY THYROID STIM HORMONE: CPT

## 2020-02-14 PROCEDURE — 36415 COLL VENOUS BLD VENIPUNCTURE: CPT

## 2020-02-15 ENCOUNTER — TELEPHONE (OUTPATIENT)
Dept: MEDICAL GROUP | Facility: MEDICAL CENTER | Age: 65
End: 2020-02-15

## 2020-02-15 DIAGNOSIS — E03.8 OTHER SPECIFIED HYPOTHYROIDISM: Chronic | ICD-10-CM

## 2020-02-15 RX ORDER — LEVOTHYROXINE SODIUM 0.15 MG/1
150 TABLET ORAL
Qty: 90 TAB | Refills: 1 | Status: SHIPPED | OUTPATIENT
Start: 2020-02-15 | End: 2020-09-15

## 2020-02-15 NOTE — TELEPHONE ENCOUNTER
Called patient and left message.  Please notify the patient that her thyroid blood test is within range, I will send a prescription refill to her pharmacy.

## 2020-02-18 ENCOUNTER — TELEPHONE (OUTPATIENT)
Dept: MEDICAL GROUP | Facility: MEDICAL CENTER | Age: 65
End: 2020-02-18

## 2020-02-18 NOTE — TELEPHONE ENCOUNTER
----- Message from Kenneth Reinoso M.D. sent at 2/15/2020  1:29 PM PST -----  Called patient and left message.  Please notify the patient that her thyroid blood test is within range, I will send a prescription refill to her pharmacy.

## 2020-03-05 ENCOUNTER — TELEPHONE (OUTPATIENT)
Dept: URGENT CARE | Facility: MEDICAL CENTER | Age: 65
End: 2020-03-05

## 2020-03-05 DIAGNOSIS — J30.1 ALLERGIC RHINITIS DUE TO POLLEN, UNSPECIFIED SEASONALITY: ICD-10-CM

## 2020-03-05 NOTE — LETTER
[facility]  Kenneth Reinoso M.D.  [address]  [place] [address]  Fax: [phone]   Authorization for Release/Disclosure of   Protected Health Information   Name: FLORENCIO HAMILTON : 1955 SSN: [id number]   Address: [address] [place] [address]  [place] [address] Phone:    [phone] (home)    I authorize the entity listed below to release/disclose the PHI below to:   [facility]/Kenneth Reinoso M.D. and Kenneth Reinoso M.D.   Provider or Entity Name:                                                       Dr Metcalf ([facility])   Address   City, State, [facility]   Phone:      Fax:     Reason for request: continuity of care   Information to be released: PAST 1 YR OF RECORDS   [  ] LAST COLONOSCOPY,  including any PATH REPORT and follow-up  [  ] LAST FIT/COLOGUARD RESULT [  ] LAST DEXA  [  ] LAST MAMMOGRAM  [  ] LAST PAP  [  ] LAST LABS [  ] RETINA EXAM REPORT  [  ] IMMUNIZATION RECORDS  [ xx ] Release all info      [  ] Check here and initial the line next to each item to release ALL health information INCLUDING  _____ Care and treatment for drug and / or alcohol abuse  _____ HIV testing, infection status, or AIDS  _____ Genetic Testing    DATES OF SERVICE OR TIME PERIOD TO BE DISCLOSED: _____________  I understand and acknowledge that:  * This Authorization may be revoked at any time by you in writing, except if your health information has already been used or disclosed.  * Your health information that will be used or disclosed as a result of you signing this authorization could be re-disclosed by the recipient. If this occurs, your re-disclosed health information may no longer be protected by State or Federal laws.  * You may refuse to sign this Authorization. Your refusal will not affect your ability to obtain treatment.  * This Authorization becomes effective upon signing and will  on (date) __________.      If no date is indicated, this Authorization will  one (1) year from the  signature date.    Name: Mari Kinsey    Signature:   Date:     3/9/2020       PLEASE FAX REQUESTED RECORDS BACK TO: [phone]

## 2020-03-09 PROBLEM — J30.9 ALLERGIC RHINITIS: Status: ACTIVE | Noted: 2020-03-09

## 2020-03-09 NOTE — TELEPHONE ENCOUNTER
Thank you please notify the patient that I will place the referral to allergy    please call 's office and request last 12 months of medical records.

## 2020-03-31 ENCOUNTER — TELEPHONE (OUTPATIENT)
Dept: MEDICAL GROUP | Facility: MEDICAL CENTER | Age: 65
End: 2020-03-31

## 2020-03-31 ENCOUNTER — TELEPHONE (OUTPATIENT)
Dept: HEALTH INFORMATION MANAGEMENT | Facility: OTHER | Age: 65
End: 2020-03-31

## 2020-03-31 NOTE — TELEPHONE ENCOUNTER
1. Caller Name: Mari                     Call Back Number: [phone]  [facility] PCP or Specialty Provider: Yes         2.  Does patient have any active symptoms of respiratory illness (fever OR cough OR shortness of breath OR sore throat)? Yes, the patient reports the following respiratory symptoms: cough and SOB with activity.  Started about 5 days ago.  Cough is getting better.  Has an pulse oximeter, showing 91-93%.  NO fever, so sore throat.      3.  Does patient have any comoribidities? None     4.  Has the patient traveled in the last 14 days OR had any known contact with someone who is suspected or confirmed to have COVID-19?  Yes, CHASITY Stovall 3/14/2020.  No exposure to anyone at this time.     5. POTENTIAL PUI (LOW): Advised to perform self care, monitor for worsening symptoms and to call back in 3 days if no improvement. Instructed to self isolate and contact [facility] at [phone] Offered TeleMedicine and Virtual  appointment, patient would like to self monitor for the next 24 hours and reassess at that time.         Note routed to [facility] Provider: LEE only.

## 2020-03-31 NOTE — TELEPHONE ENCOUNTER
Mari Kinsey  [phone]    Pt called and wanted to sched an appt. Describes SOB x 1 week and a cough. Called pt and instructed her to call Nurse line and get screened.

## 2020-07-07 ENCOUNTER — TELEPHONE (OUTPATIENT)
Dept: MEDICAL GROUP | Facility: MEDICAL CENTER | Age: 65
End: 2020-07-07

## 2020-07-07 DIAGNOSIS — H35.30 MACULAR DEGENERATION, UNSPECIFIED LATERALITY, UNSPECIFIED TYPE: ICD-10-CM

## 2020-07-09 ENCOUNTER — TELEPHONE (OUTPATIENT)
Dept: MEDICAL GROUP | Facility: MEDICAL CENTER | Age: 65
End: 2020-07-09

## 2020-07-09 ENCOUNTER — OFFICE VISIT (OUTPATIENT)
Dept: MEDICAL GROUP | Facility: MEDICAL CENTER | Age: 65
End: 2020-07-09
Payer: COMMERCIAL

## 2020-07-09 VITALS
WEIGHT: 204 LBS | DIASTOLIC BLOOD PRESSURE: 86 MMHG | BODY MASS INDEX: 36.14 KG/M2 | HEIGHT: 63 IN | SYSTOLIC BLOOD PRESSURE: 134 MMHG | OXYGEN SATURATION: 93 % | HEART RATE: 81 BPM | TEMPERATURE: 98.1 F

## 2020-07-09 DIAGNOSIS — I10 HYPERTENSION, UNSPECIFIED TYPE: ICD-10-CM

## 2020-07-09 DIAGNOSIS — E78.5 DYSLIPIDEMIA: Chronic | ICD-10-CM

## 2020-07-09 DIAGNOSIS — Z20.822 EXPOSURE TO COVID-19 VIRUS: ICD-10-CM

## 2020-07-09 DIAGNOSIS — E03.8 OTHER SPECIFIED HYPOTHYROIDISM: Chronic | ICD-10-CM

## 2020-07-09 DIAGNOSIS — I10 ESSENTIAL HYPERTENSION: ICD-10-CM

## 2020-07-09 DIAGNOSIS — E66.9 CLASS 1 OBESITY WITHOUT SERIOUS COMORBIDITY IN ADULT, UNSPECIFIED BMI, UNSPECIFIED OBESITY TYPE: ICD-10-CM

## 2020-07-09 DIAGNOSIS — R73.09 IMPAIRED GLUCOSE METABOLISM: ICD-10-CM

## 2020-07-09 PROCEDURE — 99213 OFFICE O/P EST LOW 20 MIN: CPT | Performed by: INTERNAL MEDICINE

## 2020-07-09 RX ORDER — LISINOPRIL 10 MG/1
10 TABLET ORAL DAILY
Qty: 30 TAB | Refills: 3 | Status: SHIPPED | OUTPATIENT
Start: 2020-07-09 | End: 2020-11-02

## 2020-07-09 ASSESSMENT — FIBROSIS 4 INDEX: FIB4 SCORE: 1.21

## 2020-07-09 ASSESSMENT — PATIENT HEALTH QUESTIONNAIRE - PHQ9: CLINICAL INTERPRETATION OF PHQ2 SCORE: 0

## 2020-07-09 NOTE — PROGRESS NOTES
Subjective:      Mari Kinsey is a 64 y.o. female who presents with eye Follow-Up            HPI     Recently saw dr.mills and told on eye exam had micro hemorrhage and told might be related to blood sugar or blood pressure, has home blood pressure cuff. Had been having floaters this week, left eye, no migraine in the past, no loss vision, no prior history of hypertension or diabetes, last a1c 5.7%. doing power walk every evening so walking 8000 steps per day, has been trying to improve and has seen weight management, trying to avoid sweets and processed foods. No prior history of hypertension, mother did have hypertension.   Sleep apnea on cpap  Hypothyroid on replacement       Current Outpatient Medications   Medication Sig Dispense Refill   • levothyroxine (SYNTHROID) 150 MCG Tab Take 1 Tab by mouth Every morning on an empty stomach. 90 Tab 1   • Multiple Vitamins-Minerals (PRESERVISION AREDS 2 PO) Take  by mouth.     • B Complex Vitamins (B COMPLEX PO) Take  by mouth.     • vitamin D (CHOLECALCIFEROL) 1000 UNIT Tab Take 2,000 Units by mouth every day.       No current facility-administered medications for this visit.      Patient Active Problem List   Diagnosis   • Hypothyroid   • Preventative health care   • Bunion of great toe of left foot   • Deviated nasal septum   • Dyslipidemia   • Sleep apnea   • History of allergic rhinitis   • Skin lesion   • Family history of breast cancer in sister   • Macular degeneration   • Obesity   • Microscopic hematuria   • Lymphoma (HCC)   • Decreased GFR   • Allergic rhinitis       Depression Screening    Little interest or pleasure in doing things?  0 - not at all  Feeling down, depressed , or hopeless? 0 - not at all  Patient Health Questionnaire Score: 0            Health Maintenance Summary                PAP SMEAR Overdue 2/23/2020      Done 2/23/2017      Patient has more history with this topic...    MAMMOGRAM Overdue 3/13/2020      Done 3/13/2019  "[id number]     Patient has more history with this topic...    IMM INFLUENZA Next Due 9/1/2020      Done 9/3/2019 Imm Admin: Influenza Vaccine Quad Inj (Pf)     Patient has more history with this topic...    COLONOSCOPY Next Due 11/17/2020      Done 11/17/2010 AMB REFERRAL TO GI FOR COLONOSCOPY    IMM DTaP/Tdap/Td Vaccine Next Due 12/9/2021      Done 12/9/2011 Imm Admin: Tdap Vaccine          Patient Care Team:  Kenneth Reinoso M.D. as PCP - General  [employer] as Res[employer] (DME Supplier)      ROS       Objective:     /86 (BP Location: Left arm, Patient Position: Sitting, BP Cuff Size: Large adult)   Pulse 81   Temp 36.7 °C (98.1 °F)   Ht 1.6 m (5' 3\")   Wt 92.5 kg (204 lb)   SpO2 93%   BMI 36.14 kg/m²      Physical Exam  Vitals signs and nursing note reviewed.   HENT:      Head: Normocephalic and atraumatic.      Mouth/Throat:      Pharynx: No oropharyngeal exudate.   Eyes:      Conjunctiva/sclera: Conjunctivae normal.   Neck:      Musculoskeletal: Neck supple.   Cardiovascular:      Rate and Rhythm: Normal rate and regular rhythm.   Pulmonary:      Effort: Pulmonary effort is normal.      Breath sounds: Normal breath sounds.   Abdominal:      General: There is no distension.   Skin:     General: Skin is warm.   Neurological:      General: No focal deficit present.   Psychiatric:         Mood and Affect: Mood normal.     unable to view fundi right not dilated, no obvious hemorrhage            Assessment/Plan:       Assessment  #! Borderline blood pressure but saw  for eye exam ?micro hemorrhage retina, we have no records, no diabetes    #2 sleep apnea on cpap    #3 hypothyroid on replacement    #4 BMI 36.1 has seen weight management     Plan  #1 labs in 4 weeks     #2 Need old records  eye    #3 start lisinopril 10 mg can cause cough, lightheadedness    #4 check blood pressure daily and record    #5 nutrition, diet, exercise discussed    #6 follow up 2 months    #7 continue " cpap

## 2020-08-10 ENCOUNTER — OFFICE VISIT (OUTPATIENT)
Dept: MEDICAL GROUP | Facility: MEDICAL CENTER | Age: 65
End: 2020-08-10
Payer: COMMERCIAL

## 2020-08-10 VITALS
TEMPERATURE: 98.6 F | WEIGHT: 196 LBS | BODY MASS INDEX: 34.73 KG/M2 | DIASTOLIC BLOOD PRESSURE: 70 MMHG | HEART RATE: 89 BPM | SYSTOLIC BLOOD PRESSURE: 112 MMHG | OXYGEN SATURATION: 92 % | HEIGHT: 63 IN

## 2020-08-10 DIAGNOSIS — E66.9 CLASS 1 OBESITY WITHOUT SERIOUS COMORBIDITY IN ADULT, UNSPECIFIED BMI, UNSPECIFIED OBESITY TYPE: ICD-10-CM

## 2020-08-10 DIAGNOSIS — R31.29 MICROSCOPIC HEMATURIA: ICD-10-CM

## 2020-08-10 DIAGNOSIS — I10 HYPERTENSION, UNSPECIFIED TYPE: ICD-10-CM

## 2020-08-10 DIAGNOSIS — R94.4 DECREASED GFR: ICD-10-CM

## 2020-08-10 DIAGNOSIS — C85.90 LYMPHOMA, UNSPECIFIED BODY REGION, UNSPECIFIED LYMPHOMA TYPE (HCC): ICD-10-CM

## 2020-08-10 DIAGNOSIS — Z00.00 PREVENTATIVE HEALTH CARE: Chronic | ICD-10-CM

## 2020-08-10 DIAGNOSIS — G47.33 OBSTRUCTIVE SLEEP APNEA SYNDROME: ICD-10-CM

## 2020-08-10 DIAGNOSIS — E03.8 OTHER SPECIFIED HYPOTHYROIDISM: ICD-10-CM

## 2020-08-10 DIAGNOSIS — E78.5 DYSLIPIDEMIA: ICD-10-CM

## 2020-08-10 DIAGNOSIS — Z12.11 COLON CANCER SCREENING: ICD-10-CM

## 2020-08-10 PROCEDURE — 99213 OFFICE O/P EST LOW 20 MIN: CPT | Performed by: INTERNAL MEDICINE

## 2020-08-10 ASSESSMENT — FIBROSIS 4 INDEX: FIB4 SCORE: 1.21

## 2020-08-10 NOTE — PROGRESS NOTES
Subjective:      Mari Kinsey is a 64 y.o. female who presents with blood pressure check            HPI     Here follow-up blood pressure on lisinopril 10 mg daily.  Patient did have a blood pressure cuff and has been checking her blood pressure but the cuff has not been working recently.  She has been exercising 5 to 7 days per week, swimming at pool at home 5 days per week, and walking daily for 30 minutes and has lost weight, tries to limit sweets and processed foods, and has cut back on bread. Has been on lisinopril and no cough, blood pressure has improved at home until blood pressure cuff malfunctioned. Has cut out salt and popcorn, no soda. Drinks tea and water, no soda, no alcohol.  Sleep apnea on CPAP.  Gets mammogram in [place].  Hypothyroid on replacement  History lymphoma followed by hematology oncology  Labs done at [facility] diagnostic  8/7/20 urine mac<30 done at [facility]  8/7/20 chol 233,trig 143,hdl 56,ldl 150 done at [facility]  8/7/20 tsh 2.9 done at [facility]  8/7/20 covid igg done at [facility]  8/7/20 A1c 5.6% done at [facility]  8/7/20 BUN 14, creatinine 0.88, GFR greater than 60        Current Outpatient Medications   Medication Sig Dispense Refill   • lisinopril (PRINIVIL) 10 MG Tab Take 1 Tab by mouth every day. 30 Tab 3   • levothyroxine (SYNTHROID) 150 MCG Tab Take 1 Tab by mouth Every morning on an empty stomach. 90 Tab 1   • Multiple Vitamins-Minerals (PRESERVISION AREDS 2 PO) Take  by mouth.     • B Complex Vitamins (B COMPLEX PO) Take  by mouth.     • vitamin D (CHOLECALCIFEROL) 1000 UNIT Tab Take 2,000 Units by mouth every day.       No current facility-administered medications for this visit.      Sleep apnea  2/11 [facility] sleep study moderate obstructive sleep apnea syndrome with apnea-hypotony index 28, low saturation 83%, successful CPAP titration final pressure 9 cm with improvement in apnea-hypotony index to 1.5 with low saturation 91%  3/11 [facility] note on cpap 9  3/12 [facility] note on cpap 9 12/12 off  cpap  12/14 back on cpap 9  12/20/15 on cpap 9  10/10/17sleep note continue cpap 9  11/20/17 echo normal LV size and function, EF 70%, mild tricuspid regurgitation, RVSP 35  10/19/18 sleep note on cpap 9  10/21/19 sleep note on cpap 9      Skin lesion followed by dermatology Dr. Mondragon     [facility]  10/17/10 [facility] colon negative repeat 10 yrs  12/9/11 tdap   9/2/14 zoster vaccine at [facility]  12/22/16 pneumovax  12/29/16 dexa LS -0.7,hip +0.8  2/23/17 pap per  gyn   8/4/17 completed hep b vaccine series   3/13/19 mammogram done [facility] negative stable benign 6 mm left 2:00 nodule  11/26/19 shingrix second at [facility]  12/31/19 vit d 25 start 4000 units   1/30/20 FIT negative  8/7/20 covid igg done at [facility]     Microscopic hematuria  12/19/14 UA 2-5 RBC repeat in 1 month  1/21/15 UA trace blood   1/27/15 ultrasound renal negative  2/15 drop of urine one more time, send out UA evaluate microscopic hematuria , if still positive for urology  2/27/15 UA positive blood refer to urology  4/4/15 CT abdomen and pelvis with and without; retroperitoneal periaortic pericaval and retrocrural adenopathy, mesenteric adenopathy, no hydronephrosis or urolithiasis  10/16/15 urology note microscopic hematuria workup, CT urogram negative, cystoscopy bladder neck inflammation with cytology negative, hematuria has resolved; followup 6 months  4/13/16 [place]rology [place] note UA and cytology  12/28/18 UA   8/10/20 UA negative done at [facility]     Macular degeneration  Sees   12/20/18 referral to   2/13/19  eye exam macular degeneration, continue antioxidants and areds formula  7/7/20  eye exam     Lymphoma  4/4/15 CT abdomen and pelvis with and without; retroperitoneal periaortic pericaval and retrocrural adenopathy, mesenteric adenopathy, no hydronephrosis or urolithiasis  12/11/15 ,,alk phos 170,bili 1.2   12/20/15 ultrasound liver pending, repeat liver enzymes and secondary  workup pending, CT abdomen and pelvis ordered at urgent care pending follow-up lymphadenopathy  12/24/15 ultrasound abdomen and biliary tree, prominent liver size, no focal mass, gallbladder contains numerous mobile stones, no thickening or pericholecystic fluid is noted, maximum diameter common bile duct 9.8 which is enlarged, 2.6 x 2.2 x 2.6 cm nodule hypoechoic area in the pancreatic body not well visualized because of gas  12/24/15 AST 34,ALT 76,GGT 76.alk phos 88,bili 0.5,acute hep panel negative, iron 45,%sat 13,ferritin 67,AMA 22 (20-25 equivocal),F-actin Ab IgG negative,RE neg,SPEP neg  12/26/15 CT with contrast pending follow-up adenopathy on previous CT and possible pancreatic lesion on ultrasound, referral to surgery for cholelithiasis.  1/12/16  surgery scheduled for laparoscopic cholecystectomy, laparoscopic biopsy of lymph nodes, conversion to open procedure to complete biopsy if necessary to rule out lymphoma  2/3/16 laparoscopic mesenteric lymph node biopsy limited sampling, partial involvement follicular lymphoma B-cell origin favor low-grade 1-2/3, flow cytometry CD10 monoclonal B cells, demonstrating lambda light chain expression and call expression of CD10 with CD19, CD20, negative for CD5 and CD43  2/19/16 CT/PET hypermetabolic activity within retrocrural, periaortic, aortocaval space, proximal pelvic adenopathy within the left common iliac chain, hypermetabolic adenopathy within mesentery  3/16/16  oncology consultation; 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 consistent with low-grade non-hodgkin's lymphoma follicular-type CD10 and CD20 positive, likely low-grade apparently indolent course, most likely will need observation, recommend complete workup including bone marrow biopsy, LDH, CMP,and once complete will calculate FLPI score  4/6/16  oncology note wbc 4.6,hgb 13.9,hct 43,plt 240; stage IIIa follicular lymphoma low-grade, recommend  observation follow-up every 3 months first year and then every 6 months, CBC, CMP, LDH, beta-2 microglobulin and imaging studies as indicated, follow up 3 months  7/6/16 dr.reganti chairez note, wbc 7.7,hgb 13.6,hct 40,plt 304, ,beta 2 microglobulin 2.5 (1.1-2.4),uric 5.7;follow up 3 months  10/5/16 dr.reganti chairez note clinically no signs of recurrence, repeat CT chest, abdomen, pelvis in january, CMP, LDH today, follow-up 3 months  12/27/16 wbc 7.7 (49%N,30%L)  12/29/16 CT abdomen and pelvis with; interval resolution of retrocrural, para-aortic, paracaval, and mesenteric adenopathy, no adenopathy appreciated in pelvis, stable 2 cm or less diameter nodules right middle lobe  1/17/17 dr.reganti chairez note, doing well with recent CT showing no evidence of adenopathy, repeat labs prior to next visit CBC, CMP, LDH, beta-2 microglobulin, follow-up 6 months  7/12/17 cbc,cmp normal,,beta 2 microglobulin  7/18/17 dr.reganti chairez note no evidence disease progression  12/29/17 wbc 4.3 (61%N,18%L)  1/16/18 dr.reganti chairez note no evidence of disease progression, recent labs cbc,cmp,ldh normal, follow-up 6 months  7/30/18 dr.reganti chairez note stable no recurrence of disease, labs ordered  1/28/19 dr.reganti chairez note repeat labs follow up 6 months  8/7/19 wbc 8.0,hgb 13,hct 43,,cmp negative  8/14/19 dr.reganti chairez note no clinical symptoms, laboratory work reviewed, follow-up 1 year     Hypothyroid  9/10 tsh 1.9 on levothyroxine 112 mcg  12/9/11 tsh 4.1 on levothyroxine 112 mcg; increase to levothyroxine 125 mcg  2/12 tsh 2.4 cont levothyroxine 125 mcg  1/13 tsh 3.5 on levothyroxine 125 mcg  2/28/14 tsh 3.9 on levothyroxine 125 mcg  12/19/14 tsh 3.8 on levothyroxine 125 mcg  12/24/15 tsh 2.0 on levothyroxine 125 mcg  12/22/16 tsh 3.3 on levothyroxine 125 mcg   12/29/17 tsh 2.6 on levothyroxine 125 mcg  12/28/18 tsh 6.5 on levothyroxine 125 mcg, change to levothyroxine 137 mcg,  repeat tsh 6 weeks  2/14/19 tsh 2.8 on levothyroxine 137 mcg   12/31/19 tsh 9.3 on levothyroxine 137 mcg taking daily, change to 150 mcg daily repeat tsh in 6 weeks ordered  2/4/20 tsh 2.4 on levothyroxine 150 mcg  8/7/20 tsh 2.9 on levothyroxine 150 mcg done at [facility]    hypertension  7/9/20 start lisinopril 10 mg     Dyslipidemia  9/10 chol 222,trig 144,hdl 48,ldl 144  12/9/11 chol 230,trig 106,hdl 55,ldl 154  1/13 chol 224,trig 109,hdl 46,ldl 156; 10 yr risk 4%  2/28/14 chol 226,trig 127,hdl 64,ldl 137  12/19/14 chol 205,trig 111,hdl 51,ldl 132  4/23/15 chol 212,trig 106,hdl 58,ldl 133  12/24/15 chol 212,trig 56,hdl 62,ldl 139  12/27/16 chol 231,trig 72,hdl 65,ldl 152  12/29/17 chol 208,trig 70,hdl 63,ldl 131  12/28/18 chol 219,trig 199,hdl 57,ldl 122  12/31/19 chol 221,trig 178,hdl 57,ldl 128; 10 year risk 5.3%  8/7/20 chol 233,trig 143,hdl 56,ldl 150 done at [facility], declines statin       Deviated nasal septum     decrease gfr  12/23/15 bun 13,creat 0.87,GFR>60  3/16/16 bun 18,creat 1.07,GFR>60  7/6/16 bun 20,creat 0.9,GFR 52  10/5/16 bun 14,creat 0.8,GFR 59  12/27/16 bun 17,crat 0.9,GFR>60  12/29/17 bun 17,creat 1.0,GFR>60  12/29/17 bun 17,creat 0.99,GFR 57  12/28/18 bun 13,creat 0.7,GFR>60  12/31/19 bun 15,creat 0.9,GFR>60  8/10/20 bun 14,creat 0.88,GFR>60 done at [facility]     [facility]  Sees podiatry   12/11 podiatry note  1/13 bunion surgery                 Patient Active Problem List   Diagnosis   • Hypothyroid   • Preventative health care   • Bunion of great toe of left foot   • Deviated nasal septum   • Dyslipidemia   • Sleep apnea   • Skin lesion   • Family history of breast cancer in sister   • Macular degeneration   • Obesity   • Microscopic hematuria   • Lymphoma (HCC)   • Decreased GFR   • Allergic rhinitis   • Hypertension            Health Maintenance Summary                PAP SMEAR Overdue 2/23/2020      Done 2/23/2017      Patient has more history with this topic...    MAMMOGRAM  "Overdue 3/13/2020      Done 3/13/2019 [id number]     Patient has more history with this topic...    IMM INFLUENZA Next Due 9/1/2020      Done 9/3/2019 Imm Admin: Influenza Vaccine Quad Inj (Pf)     Patient has more history with this topic...    COLONOSCOPY Next Due 11/17/2020      Done 11/17/2010 AMB REFERRAL TO GI FOR COLONOSCOPY    IMM DTaP/Tdap/Td Vaccine Next Due 12/9/2021      Done 12/9/2011 Imm Admin: Tdap Vaccine          Patient Care Team:  Kenneth Reinoso M.D. as PCP - General  [employer] as [employer] (DME Supplier)      ROS       Objective:     /70 (BP Location: Right arm, Patient Position: Sitting, BP Cuff Size: Adult)   Pulse 89   Temp 37 °C (98.6 °F)   Ht 1.588 m (5' 2.5\")   Wt 88.9 kg (196 lb)   SpO2 92%   BMI 35.28 kg/m²      Physical Exam  Vitals signs and nursing note reviewed.   Constitutional:       Appearance: Normal appearance.   HENT:      Head: Normocephalic and atraumatic.      Right Ear: External ear normal.      Left Ear: External ear normal.      Mouth/Throat:      Pharynx: No oropharyngeal exudate.   Eyes:      Conjunctiva/sclera: Conjunctivae normal.   Neck:      Musculoskeletal: Neck supple.   Cardiovascular:      Rate and Rhythm: Normal rate and regular rhythm.      Heart sounds: Normal heart sounds. No murmur.   Pulmonary:      Effort: Pulmonary effort is normal.      Breath sounds: Normal breath sounds.   Abdominal:      General: There is no distension.   Musculoskeletal:         General: No swelling.   Skin:     General: Skin is warm.   Neurological:      General: No focal deficit present.      Mental Status: She is alert.   Psychiatric:         Mood and Affect: Mood normal.         Behavior: Behavior normal.                 Assessment/Plan:         Assessment   #1 hypertension improved on lisinopril 10 mg daily, patient has started a good exercise program and cut back on carbohydrates improving her nutrition intake.    #2 sleep apnea remains on CPAP    #3 " obesity BMI 35.2, standard excellent job with weight loss, has seen weight management program previously, cutting back on sweets, candies, sodas, limiting carbohydrate intake especially bread    #4 dyslipidemia diet controlled 8/7/20 chol 233,trig 143,hdl 56,ldl 150 done at [facility], ten-year cardiac risk 5%    #5 hypothyroid on replacement TSH 2.9 on levothyroxine 150 mcg    #6 history of lymphoma followed by hematology oncology, normal CBC on labs        Plan  #! Declines statin, since she has been doing such a wonderful job with nutrition, diet, exercise, weight loss with a 10-year cardiac risk 5%, I agree, she can continue to work on nutrition, diet, exercise, weight loss program    #2 due mammogram gets done in [place]    #3 dexa next year    #4 follow up sleep apnea     #5 check blood pressure daily record, as she continues to exercise, follow good diet, lose weight, blood pressures will decrease, monitor for lightheadedness, dizziness, should systolic blood pressures run less than 110 consistently let me know and we will be able to decrease her lisinopril    #6 follow-up oncology    #7 influenza vaccine when available    #8 continue practicing social distancing and mask wearing with COVID pandemic    #9 follow-up in December for annual exam    #10 reviewed labs from [facility] done on 8/7/20

## 2020-10-19 ENCOUNTER — TELEPHONE (OUTPATIENT)
Dept: MEDICAL GROUP | Facility: MEDICAL CENTER | Age: 65
End: 2020-10-19

## 2020-10-19 DIAGNOSIS — L98.9 SKIN LESION: ICD-10-CM

## 2020-10-19 DIAGNOSIS — G47.33 OBSTRUCTIVE SLEEP APNEA SYNDROME: Chronic | ICD-10-CM

## 2020-10-19 DIAGNOSIS — Z12.11 COLON CANCER SCREENING: ICD-10-CM

## 2020-10-19 NOTE — TELEPHONE ENCOUNTER
Please notify the patient referrals have been placed to sleep apnea, dermatology, gastroenterology

## 2020-10-19 NOTE — TELEPHONE ENCOUNTER
Mari Silva Tio  [phone]    Pt called and LM. Needs REF sent to Dr Benites (Sleep) has an appt on thur.   Dr Mondragon ([facility]) for annual burrwos check.   [facility] Dr Tran, has upcoming colonoscopy due.

## 2020-10-21 ENCOUNTER — SLEEP CENTER VISIT (OUTPATIENT)
Dept: SLEEP MEDICINE | Facility: MEDICAL CENTER | Age: 65
End: 2020-10-21
Payer: COMMERCIAL

## 2020-10-21 VITALS
SYSTOLIC BLOOD PRESSURE: 122 MMHG | DIASTOLIC BLOOD PRESSURE: 72 MMHG | WEIGHT: 186.7 LBS | HEART RATE: 73 BPM | BODY MASS INDEX: 33.08 KG/M2 | HEIGHT: 63 IN | OXYGEN SATURATION: 96 % | RESPIRATION RATE: 14 BRPM

## 2020-10-21 DIAGNOSIS — G47.33 OBSTRUCTIVE SLEEP APNEA SYNDROME: Chronic | ICD-10-CM

## 2020-10-21 PROCEDURE — 99213 OFFICE O/P EST LOW 20 MIN: CPT | Performed by: FAMILY MEDICINE

## 2020-10-21 ASSESSMENT — FIBROSIS 4 INDEX: FIB4 SCORE: 1.21

## 2020-10-21 NOTE — PATIENT INSTRUCTIONS
"CPAP and BPAP Information  CPAP and BPAP are methods of helping a person breathe with the use of air pressure. CPAP stands for \"continuous positive airway pressure.\" BPAP stands for \"bi-level positive airway pressure.\" In both methods, air is blown through your nose or mouth and into your air passages to help you breathe well.  CPAP and BPAP use different amounts of pressure to blow air. With CPAP, the amount of pressure stays the same while you breathe in and out. With BPAP, the amount of pressure is increased when you breathe in (inhale) so that you can take larger breaths. Your health care provider will recommend whether CPAP or BPAP would be more helpful for you.  Why are CPAP and BPAP treatments used?  CPAP or BPAP can be helpful if you have:  · Sleep apnea.  · Chronic obstructive pulmonary disease (COPD).  · Heart failure.  · Medical conditions that weaken the muscles of the chest including muscular dystrophy, or neurological diseases such as amyotrophic lateral sclerosis (ALS).  · Other problems that cause breathing to be weak, abnormal, or difficult.  CPAP is most commonly used for obstructive sleep apnea (ALLEN) to keep the airways from collapsing when the muscles relax during sleep.  How is CPAP or BPAP administered?  Both CPAP and BPAP are provided by a small machine with a flexible plastic tube that attaches to a plastic mask. You wear the mask. Air is blown through the mask into your nose or mouth. The amount of pressure that is used to blow the air can be adjusted on the machine. Your health care provider will determine the pressure setting that should be used based on your individual needs.  When should CPAP or BPAP be used?  In most cases, the mask only needs to be worn during sleep. Generally, the mask needs to be worn throughout the night and during any daytime naps. People with certain medical conditions may also need to wear the mask at other times when they are awake. Follow instructions from your " health care provider about when to use the machine.  What are some tips for using the mask?    · Because the mask needs to be snug, some people feel trapped or closed-in (claustrophobic) when first using the mask. If you feel this way, you may need to get used to the mask. One way to do this is by holding the mask loosely over your nose or mouth and then gradually applying the mask more snugly. You can also gradually increase the amount of time that you use the mask.  · Masks are available in various types and sizes. Some fit over your mouth and nose while others fit over just your nose. If your mask does not fit well, talk with your health care provider about getting a different one.  · If you are using a mask that fits over your nose and you tend to breathe through your mouth, a chin strap may be applied to help keep your mouth closed.  · The CPAP and BPAP machines have alarms that may sound if the mask comes off or develops a leak.  · If you have trouble with the mask, it is very important that you talk with your health care provider about finding a way to make the mask easier to tolerate. Do not stop using the mask. Stopping the use of the mask could have a negative impact on your health.  What are some tips for using the machine?  · Place your CPAP or BPAP machine on a secure table or stand near an electrical outlet.  · Know where the on/off switch is located on the machine.  · Follow instructions from your health care provider about how to set the pressure on your machine and when you should use it.  · Do not eat or drink while the CPAP or BPAP machine is on. Food or fluids could get pushed into your lungs by the pressure of the CPAP or BPAP.  · Do not smoke. Tobacco smoke residue can damage the machine.  · For home use, CPAP and BPAP machines can be rented or purchased through home health care companies. Many different brands of machines are available. Renting a machine before purchasing may help you find out  which particular machine works well for you.  · Keep the CPAP or BPAP machine and attachments clean. Ask your health care provider for specific instructions.  Get help right away if:  · You have redness or open areas around your nose or mouth where the mask fits.  · You have trouble using the CPAP or BPAP machine.  · You cannot tolerate wearing the CPAP or BPAP mask.  · You have pain, discomfort, and bloating in your abdomen.  Summary  · CPAP and BPAP are methods of helping a person breathe with the use of air pressure.  · Both CPAP and BPAP are provided by a small machine with a flexible plastic tube that attaches to a plastic mask.  · If you have trouble with the mask, it is very important that you talk with your health care provider about finding a way to make the mask easier to tolerate.  This information is not intended to replace advice given to you by your health care provider. Make sure you discuss any questions you have with your health care provider.  Document Released: 09/15/2005 Document Revised: 04/08/2020 Document Reviewed: 11/06/2017  Elsevier Patient Education © 2020 [employer]

## 2020-10-21 NOTE — PROGRESS NOTES
[facility] Follow Up Note     Date: 10/21/2020 / Time: 1:03 PM    Patient ID:   Name:             Mari Kinsey   YOB: 1955  Age:                 64 y.o.  female   MRN:               [id number]      Thank you for requesting a sleep medicine consultation on Mari Kinsey at the sleep center. She presents today with the chief complaints of ALLEN follow up.     HISTORY OF PRESENT ILLNESS:       Pt is currently on CPAP 9 cm. She is getting about 7-8 hrs of sleep on a good night and about 5 hr of sleep on a bad night. The bad nights are about 1-2 per week. Overall,  she does finds her sleep refreshing. She denies any symptoms of RLS, narcolepsy or any symptoms to suggest parasomnias such as nightmares, sleep walking or acting out of dreams.    She is using CPAP most days of the week. Pt reports 7+ hrs of average nightly use of CPAP. Pt denies snoring, gasping,choking.Pt also denies significant mask leak that is interfering with sleep. The 30 day compliance was downloaded which shows adequate compliance with more that 4 hr usage about 100%. The AHI is has improved to 0.5/hr. The mask leak is normal. The symptoms of excessive daytime, snoring and gasping has improved with PAP.         REVIEW OF SYSTEMS:       Constitutional: Denies fevers, Denies weight changes  Eyes: Denies changes in vision, no eye pain  Ears/Nose/Throat/Mouth: Denies nasal congestion or sore throat   Cardiovascular: Denies chest pain or palpitations   Respiratory: Denies shortness of breath , Denies cough  Gastrointestinal/Hepatic: Denies abdominal pain, nausea, vomiting, diarrhea, constipation or GI bleeding   Genitourinary: Deniesdysuria or frequency  Musculoskeletal/Rheum: Denies  joint pain and swelling   Skin/Breast: Denies rash,   Neurological: Denies headache, confusion, memory loss or focal weakness/parasthesias  Psychiatric: denies mood disorder   Sleep: denies snoring     Comprehensive review of systems  form is reviewed with the patient and is attached in the EMR.     PMH:  has a past medical history of Chickenpox, Dental disorder, [place] measles, Hypothyroidism, Influenza, Mumps, ALLEN (obstructive sleep apnea), Sleep apnea, Snoring, Thyroid disease, and Tonsillitis.  MEDS:   Current Outpatient Medications:   •  levothyroxine (SYNTHROID) 150 MCG Tab, TAKE 1 TAB BY MOUTH EVERY MORNING ON AN EMPTY STOMACH., Disp: 90 Tab, Rfl: 3  •  lisinopril (PRINIVIL) 10 MG Tab, Take 1 Tab by mouth every day., Disp: 30 Tab, Rfl: 3  •  Multiple Vitamins-Minerals (PRESERVISION AREDS 2 PO), Take  by mouth., Disp: , Rfl:   •  B Complex Vitamins (B COMPLEX PO), Take  by mouth., Disp: , Rfl:   •  vitamin D (CHOLECALCIFEROL) 1000 UNIT Tab, Take 2,000 Units by mouth every day., Disp: , Rfl:   ALLERGIES:   Allergies   Allergen Reactions   • Penicillins Rash     SURGHX:   Past Surgical History:   Procedure Laterality Date   • KALE BY LAPAROSCOPY  2/3/2016    Procedure: KALE BY LAPAROSCOPY;  Surgeon: Tricia Farah M.D.;  Location: SURGERY SAME DAY [facility] ORS;  Service:    • LYMPH NODE SAMPLING  2/3/2016    Procedure: LYMPH NODE SAMPLING EXCISION, BIOPSY;  Surgeon: Tricia Farah M.D.;  Location: SURGERY SAME DAY [facility] ORS;  Service:    • BONE GRAFT      at 19 years of age   • BUNIONECTOMY     • HYSTEROSCOPY WITH VIDEO DIAGNOSTIC     • NASAL SEPTAL RECONSTRUCTION     • OTHER      bone graft to ;hand   • TONSILLECTOMY       SOCHX:  reports that she is a non-smoker but has been exposed to tobacco smoke. She has never used smokeless tobacco. She reports that she does not drink alcohol or use drugs..  FH:   Family History   Problem Relation Age of Onset   • Heart Disease Father    • Arthritis Mother         osteoporosis   • Sleep Apnea Neg Hx          Physical Exam:  Vitals/ General Appearance:   Weight/BMI: Body mass index is 33.07 kg/m².  /72 (BP Location: Left arm, Patient Position: Sitting, BP Cuff Size: Adult)   Pulse 73    "Resp 14   Ht 1.6 m (5' 3\")   Wt 84.7 kg (186 lb 11.2 oz)   SpO2 96%   Vitals:    10/21/20 1256   BP: 122/72   BP Location: Left arm   Patient Position: Sitting   BP Cuff Size: Adult   Pulse: 73   Resp: 14   SpO2: 96%   Weight: 84.7 kg (186 lb 11.2 oz)   Height: 1.6 m (5' 3\")       Pt. is alert and oriented to time, place and person. Cooperative and in no apparent distress.       Constitutional: Alert, no distress, well-groomed.  Skin: No rashes in visible areas.  Eye: Round. Conjunctiva clear, lids normal. No icterus.   ENMT: Lips pink without lesions, good dentition, moist mucous membranes. Phonation normal.  Neck: No masses, no thyromegaly. Moves freely without pain.  CV: Pulse as reported by patient  Respiratory: Unlabored respiratory effort, no cough or audible wheeze  Psych: Alert and oriented x3, normal affect and mood.     ASSESSMENT AND PLAN     1. Sleep Apnea .   She is urged to avoid supine sleep, weight gain and alcoholic beverages since all of these can worsen sleep apnea. She is cautioned against drowsy driving. If She feels sleepy while driving, She must pull over for a break/nap, rather than persist on the road, in the interest of She own safety and that of others on the road.   Plan   - Continue CPAP 9 cm    - supplies are refilled    - compliance download was reviewed and discussed with the pt   - compliance was reinforced     2. Regarding treatment of other past medical problems and general health maintenance,  She is urged to follow up with PCP.    "

## 2020-12-16 ENCOUNTER — APPOINTMENT (RX ONLY)
Dept: URBAN - METROPOLITAN AREA CLINIC 35 | Facility: CLINIC | Age: 65
Setting detail: DERMATOLOGY
End: 2020-12-16

## 2020-12-16 DIAGNOSIS — L57.0 ACTINIC KERATOSIS: ICD-10-CM

## 2020-12-16 DIAGNOSIS — Z87.2 PERSONAL HISTORY OF DISEASES OF THE SKIN AND SUBCUTANEOUS TISSUE: ICD-10-CM

## 2020-12-16 DIAGNOSIS — L81.4 OTHER MELANIN HYPERPIGMENTATION: ICD-10-CM

## 2020-12-16 DIAGNOSIS — Z71.89 OTHER SPECIFIED COUNSELING: ICD-10-CM

## 2020-12-16 DIAGNOSIS — L82.1 OTHER SEBORRHEIC KERATOSIS: ICD-10-CM

## 2020-12-16 DIAGNOSIS — D22 MELANOCYTIC NEVI: ICD-10-CM

## 2020-12-16 DIAGNOSIS — L85.3 XEROSIS CUTIS: ICD-10-CM

## 2020-12-16 PROBLEM — D22.5 MELANOCYTIC NEVI OF TRUNK: Status: ACTIVE | Noted: 2020-12-16

## 2020-12-16 PROCEDURE — 17000 DESTRUCT PREMALG LESION: CPT

## 2020-12-16 PROCEDURE — ? LIQUID NITROGEN

## 2020-12-16 PROCEDURE — ? COUNSELING

## 2020-12-16 PROCEDURE — ? PRESCRIPTION

## 2020-12-16 PROCEDURE — 99214 OFFICE O/P EST MOD 30 MIN: CPT | Mod: 25

## 2020-12-16 RX ADMIN — TRIAMCINOLONE ACETONIDE THIN LAYER: 1 CREAM TOPICAL at 00:00

## 2020-12-16 ASSESSMENT — LOCATION DETAILED DESCRIPTION DERM
LOCATION DETAILED: RIGHT DISTAL PRETIBIAL REGION
LOCATION DETAILED: RIGHT SUPERIOR UPPER BACK
LOCATION DETAILED: LEFT MEDIAL UPPER BACK
LOCATION DETAILED: LEFT POSTERIOR SHOULDER
LOCATION DETAILED: LEFT DISTAL PRETIBIAL REGION
LOCATION DETAILED: LEFT PROXIMAL DORSAL FOREARM
LOCATION DETAILED: LEFT SUPERIOR MEDIAL MIDBACK

## 2020-12-16 ASSESSMENT — LOCATION SIMPLE DESCRIPTION DERM
LOCATION SIMPLE: LEFT PRETIBIAL REGION
LOCATION SIMPLE: RIGHT PRETIBIAL REGION
LOCATION SIMPLE: LEFT FOREARM
LOCATION SIMPLE: LEFT UPPER BACK
LOCATION SIMPLE: RIGHT UPPER BACK
LOCATION SIMPLE: LEFT SHOULDER
LOCATION SIMPLE: LEFT LOWER BACK

## 2020-12-16 ASSESSMENT — LOCATION ZONE DERM
LOCATION ZONE: ARM
LOCATION ZONE: LEG
LOCATION ZONE: TRUNK

## 2020-12-16 NOTE — PROCEDURE: LIQUID NITROGEN
Consent: The patient's consent was obtained including but not limited to risks of crusting, scabbing, blistering, scarring, darker or lighter pigmentary change, recurrence, incomplete removal and infection.
Post-Care Instructions: I reviewed with the patient in detail post-care instructions. Patient is to wear sunprotection, and avoid picking at any of the treated lesions. Pt may apply Vaseline to crusted or scabbing areas.
Detail Level: Detailed
Render Note In Bullet Format When Appropriate: No
Duration Of Freeze Thaw-Cycle (Seconds): 10
Number Of Freeze-Thaw Cycles: 1 freeze-thaw cycle

## 2020-12-17 RX ORDER — TRIAMCINOLONE ACETONIDE 1 MG/G
CREAM TOPICAL BID
Qty: 1 | Refills: 1 | Status: ERX | COMMUNITY
Start: 2020-12-16

## 2020-12-21 RX ORDER — TRIAMCINOLONE ACETONIDE 1 MG/G
CREAM TOPICAL BID
Qty: 1 | Refills: 1 | Status: ERX

## 2020-12-24 ENCOUNTER — OFFICE VISIT (OUTPATIENT)
Dept: MEDICAL GROUP | Facility: MEDICAL CENTER | Age: 65
End: 2020-12-24
Payer: COMMERCIAL

## 2020-12-24 VITALS
SYSTOLIC BLOOD PRESSURE: 128 MMHG | DIASTOLIC BLOOD PRESSURE: 78 MMHG | HEIGHT: 63 IN | BODY MASS INDEX: 32.6 KG/M2 | WEIGHT: 184 LBS | OXYGEN SATURATION: 96 % | TEMPERATURE: 97.2 F | HEART RATE: 73 BPM

## 2020-12-24 DIAGNOSIS — J30.1 ALLERGIC RHINITIS DUE TO POLLEN, UNSPECIFIED SEASONALITY: ICD-10-CM

## 2020-12-24 DIAGNOSIS — Z00.00 PREVENTATIVE HEALTH CARE: Chronic | ICD-10-CM

## 2020-12-24 DIAGNOSIS — E66.9 CLASS 1 OBESITY WITHOUT SERIOUS COMORBIDITY IN ADULT, UNSPECIFIED BMI, UNSPECIFIED OBESITY TYPE: ICD-10-CM

## 2020-12-24 DIAGNOSIS — Z12.11 COLON CANCER SCREENING: ICD-10-CM

## 2020-12-24 PROCEDURE — 99396 PREV VISIT EST AGE 40-64: CPT | Performed by: INTERNAL MEDICINE

## 2020-12-24 ASSESSMENT — FIBROSIS 4 INDEX: FIB4 SCORE: 1.23

## 2020-12-24 NOTE — PROGRESS NOTES
Subjective:      Mari Kinsey is a 65 y.o. female who presents annual         HPI   Here for annual exam  Exam, medication, allergies, medical history, surgical history, social history, family history reviewed and updated  Sleep apnea  Dermatology sees Dr. Mondragon  Ophthalmology followed by Dr. Malave  Sleep apnea followed by sleep clinic  Sees oncology    Has seen Dr. Rivera allergy  Blood pressure 120/80s on lisinopril no chest pain, or shortness of breath with activity, no knee pain  SH , had been exercising 2 hours this summer, walking and working in pool, has cut back on pool therapy during the winter, still walking 30 minutes per day, drinks tea and water during the day, no soda, no alcohol, no tobacco  Left shoulder pain since taking down halloween decorations, has good range of motion, but does have an ache with overhead lifting, also laying on the left side at night will bother her, left hand dominant female, no radiation of the ache, no sensory changes, no neck pain         Current Outpatient Medications   Medication Sig Dispense Refill   • lisinopril (PRINIVIL) 10 MG Tab TAKE 1 TABLET BY MOUTH EVERY DAY 90 Tab 2   • levothyroxine (SYNTHROID) 150 MCG Tab TAKE 1 TAB BY MOUTH EVERY MORNING ON AN EMPTY STOMACH. 90 Tab 3   • Multiple Vitamins-Minerals (PRESERVISION AREDS 2 PO) Take  by mouth.     • B Complex Vitamins (B COMPLEX PO) Take  by mouth.     • vitamin D (CHOLECALCIFEROL) 1000 UNIT Tab Take 2,000 Units by mouth every day.       No current facility-administered medications for this visit.        s/p bunion surgery left foot  Sees podiatry   12/11 podiatry note  1/13 bunion surgery     Sleep apnea  2/11 [facility] sleep study moderate obstructive sleep apnea syndrome with apnea-hypotony index 28, low saturation 83%, successful CPAP titration final pressure 9 cm with improvement in apnea-hypotony index to 1.5 with low saturation 91%  3/11 [facility] note on cpap 9  3/12 [facility]  note on cpap 9  12/12 off cpap  12/14 back on cpap 9  12/20/15 on cpap 9  10/10/17sleep note continue cpap 9  11/20/17 echo normal LV size and function, EF 70%, mild tricuspid regurgitation, RVSP 35  10/19/18 sleep note on cpap 9  10/21/19 sleep note on cpap 9   10/21/20 sleep note on cpap 9     Skin lesion followed by dermatology Dr. Mnodragon     [facility]  10/17/10 [facility] colon negative repeat 10 yrs  12/9/11 tdap   9/2/14 zoster vaccine at [facility]  12/22/16 pneumovax  12/29/16 dexa LS -0.7,hip +0.8  2/23/17 pap per  gyn   8/4/17 completed hep b vaccine series   3/13/19 mammogram done [facility] negative stable benign 6 mm left 2:00 nodule  11/26/19 shingrix second at [facility]  12/31/19 vit d 25 start 4000 units   1/30/20 FIT negative  8/7/20 covid igg done at [facility]     Microscopic hematuria  12/19/14 UA 2-5 RBC repeat in 1 month  1/21/15 UA trace blood   1/27/15 ultrasound renal negative  2/15 drop of urine one more time, send out UA evaluate microscopic hematuria , if still positive for urology  2/27/15 UA positive blood refer to urology  4/4/15 CT abdomen and pelvis with and without; retroperitoneal periaortic pericaval and retrocrural adenopathy, mesenteric adenopathy, no hydronephrosis or urolithiasis  10/16/15 urology note microscopic hematuria workup, CT urogram negative, cystoscopy bladder neck inflammation with cytology negative, hematuria has resolved; followup 6 months  4/13/16 urology [place] note UA and cytology  12/28/18 UA   8/10/20 UA negative done at [facility]     Macular degeneration  Sees   12/20/18 referral to   2/13/19  eye exam macular degeneration, continue antioxidants and areds formula  7/7/20  eye exam     Lymphoma  4/4/15 CT abdomen and pelvis with and without; retroperitoneal periaortic pericaval and retrocrural adenopathy, mesenteric adenopathy, no hydronephrosis or urolithiasis  12/11/15 ,,alk phos 170,bili 1.2   12/20/15  ultrasound liver pending, repeat liver enzymes and secondary workup pending, CT abdomen and pelvis ordered at urgent care pending follow-up lymphadenopathy  12/24/15 ultrasound abdomen and biliary tree, prominent liver size, no focal mass, gallbladder contains numerous mobile stones, no thickening or pericholecystic fluid is noted, maximum diameter common bile duct 9.8 which is enlarged, 2.6 x 2.2 x 2.6 cm nodule hypoechoic area in the pancreatic body not well visualized because of gas  12/24/15 AST 34,ALT 76,GGT 76.alk phos 88,bili 0.5,acute hep panel negative, iron 45,%sat 13,ferritin 67,AMA 22 (20-25 equivocal),F-actin Ab IgG negative,RE neg,SPEP neg  12/26/15 CT with contrast pending follow-up adenopathy on previous CT and possible pancreatic lesion on ultrasound, referral to surgery for cholelithiasis.  1/12/16  surgery scheduled for laparoscopic cholecystectomy, laparoscopic biopsy of lymph nodes, conversion to open procedure to complete biopsy if necessary to rule out lymphoma  2/3/16 laparoscopic mesenteric lymph node biopsy limited sampling, partial involvement follicular lymphoma B-cell origin favor low-grade 1-2/3, flow cytometry CD10 monoclonal B cells, demonstrating lambda light chain expression and call expression of CD10 with CD19, CD20, negative for CD5 and CD43  2/19/16 CT/PET hypermetabolic activity within retrocrural, periaortic, aortocaval space, proximal pelvic adenopathy within the left common iliac chain, hypermetabolic adenopathy within mesentery  3/16/16  oncology consultation; 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 consistent with low-grade non-hodgkin's lymphoma follicular-type CD10 and CD20 positive, likely low-grade apparently indolent course, most likely will need observation, recommend complete workup including bone marrow biopsy, LDH, CMP,and once complete will calculate FLPI score  4/6/16  oncology note wbc 4.6,hgb 13.9,hct 43,plt  240; stage IIIa follicular lymphoma low-grade, recommend observation follow-up every 3 months first year and then every 6 months, CBC, CMP, LDH, beta-2 microglobulin and imaging studies as indicated, follow up 3 months  7/6/16 dr.reganti chairez note, wbc 7.7,hgb 13.6,hct 40,plt 304, ,beta 2 microglobulin 2.5 (1.1-2.4),uric 5.7;follow up 3 months  10/5/16 dr.reganti chairez note clinically no signs of recurrence, repeat CT chest, abdomen, pelvis in january, CMP, LDH today, follow-up 3 months  12/27/16 wbc 7.7 (49%N,30%L)  12/29/16 CT abdomen and pelvis with; interval resolution of retrocrural, para-aortic, paracaval, and mesenteric adenopathy, no adenopathy appreciated in pelvis, stable 2 cm or less diameter nodules right middle lobe  1/17/17 dr.reganti chairez note, doing well with recent CT showing no evidence of adenopathy, repeat labs prior to next visit CBC, CMP, LDH, beta-2 microglobulin, follow-up 6 months  7/12/17 cbc,cmp normal,,beta 2 microglobulin  7/18/17 dr.reganti chairez note no evidence disease progression  12/29/17 wbc 4.3 (61%N,18%L)  1/16/18 dr.reganti chairez note no evidence of disease progression, recent labs cbc,cmp,ldh normal, follow-up 6 months  7/30/18 dr.reganti chairez note stable no recurrence of disease, labs ordered  1/28/19 dr.reganti chairez note repeat labs follow up 6 months  8/7/19 wbc 8.0,hgb 13,hct 43,,cmp negative  8/14/19 dr.reganti chairez note no clinical symptoms, laboratory work reviewed, follow-up 1 year  8/14/20 dr.reganti chairez note no signs of disease recurrence or progression, continue observation     Hypothyroid  9/10 tsh 1.9 on levothyroxine 112 mcg  12/9/11 tsh 4.1 on levothyroxine 112 mcg; increase to levothyroxine 125 mcg  2/12 tsh 2.4 cont levothyroxine 125 mcg  1/13 tsh 3.5 on levothyroxine 125 mcg  2/28/14 tsh 3.9 on levothyroxine 125 mcg  12/19/14 tsh 3.8 on levothyroxine 125 mcg  12/24/15 tsh 2.0 on levothyroxine 125  mcg  12/22/16 tsh 3.3 on levothyroxine 125 mcg   12/29/17 tsh 2.6 on levothyroxine 125 mcg  12/28/18 tsh 6.5 on levothyroxine 125 mcg, change to levothyroxine 137 mcg, repeat tsh 6 weeks  2/14/19 tsh 2.8 on levothyroxine 137 mcg   12/31/19 tsh 9.3 on levothyroxine 137 mcg taking daily, change to 150 mcg daily repeat tsh in 6 weeks ordered  2/4/20 tsh 2.4 on levothyroxine 150 mcg  8/7/20 tsh 2.9 on levothyroxine 150 mcg done at [facility]     hypertension  7/9/20 start lisinopril 10 mg  8/7/20 on lisinopril 10 mg urine mac<30 done at [facility]     Dyslipidemia  9/10 chol 222,trig 144,hdl 48,ldl 144  12/9/11 chol 230,trig 106,hdl 55,ldl 154  1/13 chol 224,trig 109,hdl 46,ldl 156; 10 yr risk 4%  2/28/14 chol 226,trig 127,hdl 64,ldl 137  12/19/14 chol 205,trig 111,hdl 51,ldl 132  4/23/15 chol 212,trig 106,hdl 58,ldl 133  12/24/15 chol 212,trig 56,hdl 62,ldl 139  12/27/16 chol 231,trig 72,hdl 65,ldl 152  12/29/17 chol 208,trig 70,hdl 63,ldl 131  12/28/18 chol 219,trig 199,hdl 57,ldl 122  12/31/19 chol 221,trig 178,hdl 57,ldl 128; 10 year risk 5.3%  8/7/20 chol 233,trig 143,hdl 56,ldl 150 done at [facility], declines statin        Deviated nasal septum     decrease gfr  12/23/15 bun 13,creat 0.87,GFR>60  3/16/16 bun 18,creat 1.07,GFR>60  7/6/16 bun 20,creat 0.9,GFR 52  10/5/16 bun 14,creat 0.8,GFR 59  12/27/16 bun 17,crat 0.9,GFR>60  12/29/17 bun 17,creat 1.0,GFR>60  12/29/17 bun 17,creat 0.99,GFR 57  12/28/18 bun 13,creat 0.7,GFR>60  12/31/19 bun 15,creat 0.9,GFR>60  8/10/20 bun 14,creat 0.88,GFR>60 done at [facility]     Allergic rhinitis  5/14/14  allergy note tolerating immunotherapy  8/1/15  allergy note on immunotherapy  9/6/16  allergy note allergic rhinitis tolerating immunotherapy  3/19/18  allergy note off immunotherapy x1 year, symptoms restarted this past winter, patient would like to restart immunotherapy  10/30/18  allergy note stable on immunotherapy  10/22/19  allergy  note continue immunotherapy  12/23/19 monthly shots per            Patient Active Problem List   Diagnosis   • Hypothyroid   • Preventative health care   • S/p bunion surgery left foot   • Deviated nasal septum   • Dyslipidemia   • Sleep apnea   • Skin lesion   • Family history of breast cancer in sister   • Macular degeneration   • Obesity   • Microscopic hematuria   • Lymphoma (HCC)   • Decreased GFR   • Allergic rhinitis   • Hypertension          Health Maintenance Summary                PAP SMEAR Overdue 2/23/2020      Done 2/23/2017 THINPREP PAP WITH HPV     Patient has more history with this topic...    MAMMOGRAM Overdue 3/13/2020      Done 3/13/2019 [id number]     Patient has more history with this topic...    COLONOSCOPY Overdue 11/17/2020      Done 11/17/2010 AMB REFERRAL TO GI FOR COLONOSCOPY    IMM DTaP/Tdap/Td Vaccine Next Due 12/9/2021      Done 12/9/2011 Imm Admin: Tdap Vaccine          Patient Care Team:  Kenneth Reinoso M.D. as PCP - General Fried as Respiratory Therapist (DME Supplier)      ROS       Objective:          Physical Exam  Vitals signs and nursing note reviewed.   Constitutional:       Appearance: Normal appearance.   HENT:      Head: Normocephalic and atraumatic.      Right Ear: External ear normal.      Left Ear: External ear normal.   Eyes:      Conjunctiva/sclera: Conjunctivae normal.   Neck:      Musculoskeletal: Neck supple.   Cardiovascular:      Rate and Rhythm: Normal rate and regular rhythm.      Heart sounds: Normal heart sounds. No murmur.   Pulmonary:      Effort: No respiratory distress.      Breath sounds: Normal breath sounds.   Abdominal:      General: There is no distension.   Musculoskeletal:         General: No swelling.   Skin:     General: Skin is warm.   Neurological:      General: No focal deficit present.      Mental Status: She is alert.   Psychiatric:         Mood and Affect: Mood normal.         Behavior: Behavior normal.                  Assessment/Plan:          Assessment  #1 annual exam    #2 hypertension stable controlled on lisinopril    #3 hypothyroid on replacement 8/7/20 tsh 2.9 on levothyroxine 150 mcg done at [facility]    #4 sleep apnea 10/21/20 sleep note on cpap 9 followed by sleep clinic    #5 dyslipidemia 8/7/20 chol 233,trig 143,hdl 56,ldl 150 done at [facility], 10 year risk 5.7% declines statin    #6 allergic rhinitis followed by allergy and shots once per month     #7 history of lymphoma followed by hematology oncology no evidence of recurrence or progression    #8 macular degeneration followed by Dr. Malave    #9 skin lesions followed by dermatology    #10 BMI 32.9, has done excellent job with weight loss over the past few months especially during the summer months with swimming and walking but due to the winter months she is not able to swim and is walking 30 minutes a day and has been eating a bit more rich foods during the winter months    #11 preventative health  10/17/10 [facility] colon negative repeat 10 yrs  12/9/11 tdap   9/2/14 zoster vaccine at [facility]  12/22/16 pneumovax  12/29/16 dexa LS -0.7,hip +0.8  2/23/17 pap per  gyn   8/4/17 completed hep b vaccine series   3/13/19 mammogram done [facility] negative stable benign 6 mm left 2:00 nodule  11/26/19 shingrix second at [facility]  12/31/19 vit d 25 start 4000 units   1/30/20 FIT negative  8/7/20 covid igg done at [facility]        Plan  #1 health maintenance reviewed and updated    #2 due for mammogram and bone density    #3 follow-up colonoscopy    #4 old records      #5 has appt upcoming  gyn and she will order mammogram and advised patient to ask gynecology to order a bone density as well at the same time as the mammogram    #6 take vit d 4000 units     #7 colon cancer screening referral    #8 continue work on nutrition, diet, exercise, weight loss as obesity increases risk for diabetes and heart disease she has done an outstanding job losing weight  with exercise, she will try to decrease her processed food and sweets    #9 continue social distancing and mask wearing during Covid pandemic    #10 follow-up 1 year    #11 follow-up with specialists    #12 check blood pressure periodically and record continue lisinopril with weight loss, blood pressure may decrease, if blood pressure runs less than 120 consistently we may be able to cut back on her lisinopril

## 2021-02-11 ENCOUNTER — TELEPHONE (OUTPATIENT)
Dept: SLEEP MEDICINE | Facility: MEDICAL CENTER | Age: 66
End: 2021-02-11

## 2021-02-11 DIAGNOSIS — G47.33 OSA (OBSTRUCTIVE SLEEP APNEA): ICD-10-CM

## 2021-02-11 NOTE — TELEPHONE ENCOUNTER
Pt was told by [facility] that she is eligible for a new CPAP due to hers being 5+ years old.    She was last seen 10/2020.    FV was made assuming she gets a new CPAP.    Please sign order.    DME: [facility] P: [phone] F: [phone]

## 2021-02-21 ENCOUNTER — TELEPHONE (OUTPATIENT)
Dept: MEDICAL GROUP | Facility: MEDICAL CENTER | Age: 66
End: 2021-02-21

## 2021-02-21 DIAGNOSIS — M25.519 SHOULDER PAIN, UNSPECIFIED CHRONICITY, UNSPECIFIED LATERALITY: ICD-10-CM

## 2021-02-22 NOTE — TELEPHONE ENCOUNTER
Please call the patient, she dropped off a letter requesting physical therapy from 1 of these 4 therapists:    Roseanne Livingston    I just need to know what the diagnosis is, or why she would like to see physical therapy?  Please let me know.

## 2021-02-23 NOTE — TELEPHONE ENCOUNTER
Please notify the patient I will place a referral, I believe the names on the list she provided are [facility] physical therapists, I will submit the referral to [facility].

## 2021-02-23 NOTE — TELEPHONE ENCOUNTER
Pt states that when she saw you last, she c/o upper arm pain/poss tear in shoulder. You suggested PT at that time. Now pain is getting worse so she would like to pursue PT.

## 2021-03-16 ENCOUNTER — TELEPHONE (OUTPATIENT)
Dept: MEDICAL GROUP | Facility: MEDICAL CENTER | Age: 66
End: 2021-03-16

## 2021-03-16 DIAGNOSIS — E78.5 DYSLIPIDEMIA: ICD-10-CM

## 2021-03-16 NOTE — TELEPHONE ENCOUNTER
I called the patient and left a message, please notify her that her tests done at [facility] shows:  (1) her total cholesterol is still high at 254, however her good cholesterol is above normal at 63, her bad cholesterol is 166, our goal is closer to 100, if she would like to start cholesterol medication to decrease her cholesterol and decrease her risk of heart disease we can start a low-dose cholesterol medication. If she declines, then have her continue to work on a good exercise and nutrition program  (2) her liver function, kidney function tests are normal  (3) her thyroid level is normal, we can continue on the same thyroid dose  (4) her vitamin D level is normal she can continue on the same vitamin D dose over-the-counter

## 2021-03-17 NOTE — TELEPHONE ENCOUNTER
Patient notified and agreeable with plan. Patient would like to try good exercise and nutrition, then retest labs in 6 months. Patient reports that she is getting back into her exercise regimens since the weather is improving and believes her number will be better then. Please order lab work for 6-months. Patient would like these mailed.

## 2021-03-19 ENCOUNTER — PHYSICAL THERAPY (OUTPATIENT)
Dept: PHYSICAL THERAPY | Facility: MEDICAL CENTER | Age: 66
End: 2021-03-19
Attending: INTERNAL MEDICINE
Payer: COMMERCIAL

## 2021-03-19 DIAGNOSIS — M25.519 SHOULDER PAIN, UNSPECIFIED CHRONICITY, UNSPECIFIED LATERALITY: ICD-10-CM

## 2021-03-19 PROCEDURE — 97110 THERAPEUTIC EXERCISES: CPT

## 2021-03-19 PROCEDURE — 97161 PT EVAL LOW COMPLEX 20 MIN: CPT

## 2021-03-19 SDOH — ECONOMIC STABILITY: GENERAL: QUALITY OF LIFE: GOOD

## 2021-03-19 ASSESSMENT — ENCOUNTER SYMPTOMS
PAIN SCALE AT HIGHEST: 6
QUALITY: SHARP
PAIN SCALE AT LOWEST: 0
EXACERBATED BY: LIFTING
PAIN TIMING: WHEN ACTIVE
PAIN SCALE: 2
QUALITY: THROBBING

## 2021-03-19 NOTE — OP THERAPY EVALUATION
Outpatient Physical Therapy  INITIAL EVALUATION    [facility] Outpatient Physical Therapy  [address]  [place] [address]  Phone:  [phone]  Fax:  [phone]    Date of Evaluation: 2021    Patient: Mari Kinsey  YOB: 1955  MRN: [id number]     Referring Provider: Kenneth Reinoso M.D.  [address]  [address]  [place] [address]   Referring Diagnosis Shoulder pain, unspecified chronicity, unspecified laterality [M25.519]     Time Calculation    Start time: 0800  Stop time: 0850 Time Calculation (min): 50 minutes         Chief Complaint: Shoulder Problem    Visit Diagnoses     ICD-10-CM   1. Shoulder pain, unspecified chronicity, unspecified laterality  M25.519       No data found  Subjective:   History of Present Illness:     Date of onset:  10/23/2020    Mechanism of injury:  Was up on a ladder getting Halloween decorations off a high shelf and the box shifted and she heard a pop and had L shoulder pain in his shoulder. She has had pain since the incidence that has stayed the same, but pain seems to be intermittent.     Putting jacket on, clasping bra, brushing hair, etc all cause pain; patient is L handed and had to use her R hand for most overhead/behind back activity.   Quality of life:  Good  Sleep disturbance:  Non-restful sleep  Pain:     Current pain ratin    At best pain ratin    At worst pain ratin    Location:  Lateral/posterior L shoulder    Quality:  Sharp and throbbing    Pain timing:  When active    Alleviating factors: hasn't tried anything.    Aggravating factors:  Lifting (reaching behind back, IR/ER )    Progression:  Stable  Social Support:     Lives in:  One-story house    Lives with:  Spouse  Hand dominance:  Left  Diagnostic Tests:     None    Treatments:     None    Patient Goals:     Patient goals for therapy:  Decreased pain, increased motion, independence with ADLs/IADLs and return to sport/leisure  activities      Past Medical History:   Diagnosis Date   • Chickenpox    • Dental disorder     upper bridge   • Romanian measles    • Hypothyroidism    • Influenza    • Mumps    • ALLEN (obstructive sleep apnea)    • Sleep apnea     cpap   • Snoring     sleep study done   • Thyroid disease    • Tonsillitis      Past Surgical History:   Procedure Laterality Date   • KALE BY LAPAROSCOPY  2/3/2016    Procedure: KALE BY LAPAROSCOPY;  Surgeon: Tricia Farah M.D.;  Location: SURGERY SAME DAY [facility] ORS;  Service:    • LYMPH NODE SAMPLING  2/3/2016    Procedure: LYMPH NODE SAMPLING EXCISION, BIOPSY;  Surgeon: Tricia Farah M.D.;  Location: SURGERY SAME DAY [facility] ORS;  Service:    • BONE GRAFT      at 19 years of age   • BUNIONECTOMY     • HYSTEROSCOPY WITH VIDEO DIAGNOSTIC     • NASAL SEPTAL RECONSTRUCTION     • OTHER      bone graft to ;hand   • TONSILLECTOMY       Social History     Tobacco Use   • Smoking status: Passive Smoke Exposure - Never Smoker   • Smokeless tobacco: Never Used   • Tobacco comment: parents smoke when she was a child   Substance Use Topics   • Alcohol use: No     Alcohol/week: 0.0 oz     Family and Occupational History     Socioeconomic History   • Marital status:      Spouse name: Not on file   • Number of children: Not on file   • Years of education: Not on file   • Highest education level: Not on file   Occupational History   • Not on file       Objective     Postural Observations  Seated posture: fair  Standing posture: fair        Cervical Screen    Cervical range of motion within normal limits  Thoracic Screen    Thoracic range of motion within normal limits    Neurological Testing     Sensation     Shoulder   Left Shoulder   Intact: light touch, pin prick and sharp/dull discrimination    Right Shoulder   Intact: light touch, pin prick and sharp/dull discrimination    Palpation   Left   No palpable tenderness to the biceps, infraspinatus, middle deltoid and supraspinatus.      Tenderness     Left Shoulder   No tenderness in the subacromial bursa, subscapularis tendon and supraspinatus tendon.     Right Shoulder  No tenderness     Active Range of Motion   Left Shoulder   Flexion: 148 degrees   Abduction: 145 degrees   External rotation BTH: T2   Internal rotation BTB: T8     Right Shoulder   Flexion: 161 degrees   External rotation BTH: C7   Internal rotation BTB: L5     Scapular Mobility   Left Shoulder   Scapular mobility: good  Scapular Mobility with Shoulder to 90° FF   Scapular winging: minimal    Scapular Mobility beyond 90° FF   Scapular winging: minimal    Right Shoulder   Scapular mobility: good  Scapular Mobility with Shoulder to 90° FF   Scapular winging: minimal    Scapular Mobility beyond 90° FF   Scapular winging: minimal    Strength:      Left Shoulder   Planes of Motion   Flexion: 4+   Extension: 5   Abduction: 4   Adduction: 4   External rotation at 0°: 4+   External rotation at 45°: 4+   External rotation at 90°: 3+   Internal rotation at 0°: 4+   Internal rotation at 45°: 4   Internal rotation at 90°: 3+     Right Shoulder   Normal muscle strength    Tests     Left Shoulder   Positive crank, full can, Speed's and Yi's.   Negative apprehension, clunk, empty can and Hawkin's.         Therapeutic Exercises (CPT 45505):     2. Scap retractions, x10; 3 second hold    3. TB rows, orange TB, x10    4. TB ER/IRq, orange TB, x10 each, towel roll under arm    14. UPOC:     15. Progress note:       Therapeutic Exercise Summary: HEP: Patient provided handout (created in HEP2go), to be uploaded, exercises include:   Scap Retractions, TB rows, TB ER/IR        Time-based treatments/modalities:    Physical Therapy Timed Treatment Charges  Therapeutic exercise minutes (CPT 82202): 18 minutes      Assessment, Response and Plan:   Impairments: abnormal or restricted ROM, activity intolerance, lacks appropriate home exercise program, limited ADL's, limited mobility and pain with  function    Assessment details:  The patient is a 65 year old female who presents to PT evaluation with complaints of L shoulder pain following an injury while getting a box of Halloween decorations down from a shelf in October 2020. The patient presents with decreased ROM, positive special tests for shoulder (Crank, Full Can, Yi, and Speeds), fair seated/standing posture, grossly 4/5 L shoulder strength, and no significant tenderness to palpation. Evaluation is consistent with possible labral injury. She would benefit from skilled PT in order to improve shoulder pain and functional mobility.   Barriers to therapy:  None  Prognosis: good    Goals:   Short Term Goals:   1. The patient will demonstrate HEP independently  2.  The patient will be able to wash/brush her hair with her L hadn without pain.   Short term goal time span:  2-4 weeks      Long Term Goals:    1. The patient will be able to clasp bra behind her back.   2. The patient will be able to place a gallon of milk back into the fridge without increased pain.   Long term goal time span:  4-6 weeks    Plan:   Therapy options:  Physical therapy treatment to continue  Planned therapy interventions:  E Stim Unattended (CPT 97689), Manual Therapy (CPT 79532), Neuromuscular Re-education (CPT 33956), Therapeutic Activities (CPT 62278) and Therapeutic Exercise (CPT 31286)  Frequency:  2x week  Duration in weeks:  6  Duration in visits:  12  Discussed with:  Patient      Functional Assessment Used        Referring provider co-signature:  I have reviewed this plan of care and my co-signature certifies the need for services.    Certification Period: 03/19/2021 to  04/30/21    Physician Signature: ________________________________ Date: ______________

## 2021-03-23 ENCOUNTER — TELEPHONE (OUTPATIENT)
Dept: PHYSICAL THERAPY | Facility: MEDICAL CENTER | Age: 66
End: 2021-03-23

## 2021-03-23 ENCOUNTER — PHYSICAL THERAPY (OUTPATIENT)
Dept: PHYSICAL THERAPY | Facility: MEDICAL CENTER | Age: 66
End: 2021-03-23
Attending: INTERNAL MEDICINE
Payer: COMMERCIAL

## 2021-03-23 DIAGNOSIS — M25.512 ACUTE PAIN OF LEFT SHOULDER: ICD-10-CM

## 2021-03-23 PROCEDURE — 97110 THERAPEUTIC EXERCISES: CPT

## 2021-03-23 NOTE — OP THERAPY DAILY TREATMENT
Outpatient Physical Therapy  DAILY TREATMENT     [facility] Outpatient Physical Therapy  [address]  [place] [address]  Phone:  [phone]  Fax:  [phone]    Date: 03/23/2021    Patient: Mari Kinsey  YOB: 1955  MRN: [id number]     Time Calculation    Start time: 0800  Stop time: 0828 Time Calculation (min): 28 minutes         Chief Complaint: Shoulder Injury    Visit #: 2    SUBJECTIVE:  The patient reports that some of the exercises were uncomfortable (in particular the ER with the band); but she was prepping for a colonoscopy the whole weekend so she didn't really stick to the exercises to save her energy.     OBJECTIVE:  Current objective measures: fwd shoulder posture, fair postural strength.     BP: 90/52  92/54           Therapeutic Exercises (CPT 96356):     1. UBE, 2min fwd/2 min bkwd    2. Scap retractions, x10; 3 second hold    3. Rows, 5# each side; 2x15    4. ER/IR, 5#; x15 each, towel roll under arm    5. Wall washers, x20 each side    14. UPOC:     15. Progress note:       Therapeutic Exercise Summary: HEP: Patient provided handout (created in HEP2go), to be uploaded, exercises include:   Scap Retractions, TB rows, TB ER/IR        Time-based treatments/modalities:    Physical Therapy Timed Treatment Charges  Therapeutic exercise minutes (CPT 44573): 23 minutes      ASSESSMENT:   Response to treatment: during treatment, the patient reported feeling faint while doing wall washers; patient as immediately sat in a chair and her blood pressure was taken; blood pressure was 90/52 when first taken at 0823 and then taken again at 0826 and was 92/54. The patient was offered water when first sat down and she declined but when offered again after first blood pressure read, she accepted and was able to drink the cup of water without difficulty. It was recommended to the patient that she rest and that if possible, someone come get her and she not  drive, patient was agreeable to that plan. She was able to call her daughter who was able to come get her. She also reports that the colonoscopy place was supposed to follow up with her so she would let them know what happened.     Will follow up with the patient later today to see how she is feeling and if she is still feeling unwell, discussed possible urgent care visit or call with her PCP.     PLAN/RECOMMENDATIONS:   Plan for treatment: therapy treatment to continue next visit.  Planned interventions for next visit: continue with current treatment.

## 2021-03-23 NOTE — OP THERAPY DAILY TREATMENT
Called the patient at 1117 to check and see how they were feeling; while in PT this morning patient had a drop in blood pressure while doing a standing exercise at the wall and reported feeling faint.     The patient reports she is feeling much better and doesn't feel faint, she thinks its a combination of not having enough on her stomach and not being as well hydrated (she had a colonoscopy yesterday). She does have a BP monitor at home and she is going to be checking it and she reports that if she has any issues with her blood pressure she will go to urgent care.

## 2021-03-26 ENCOUNTER — PHYSICAL THERAPY (OUTPATIENT)
Dept: PHYSICAL THERAPY | Facility: MEDICAL CENTER | Age: 66
End: 2021-03-26
Attending: INTERNAL MEDICINE
Payer: COMMERCIAL

## 2021-03-26 DIAGNOSIS — M25.512 CHRONIC LEFT SHOULDER PAIN: ICD-10-CM

## 2021-03-26 DIAGNOSIS — G89.29 CHRONIC LEFT SHOULDER PAIN: ICD-10-CM

## 2021-03-26 PROCEDURE — 97110 THERAPEUTIC EXERCISES: CPT

## 2021-03-26 PROCEDURE — 97014 ELECTRIC STIMULATION THERAPY: CPT

## 2021-03-26 NOTE — OP THERAPY DAILY TREATMENT
Outpatient Physical Therapy  DAILY TREATMENT     [facility] Outpatient Physical Therapy  [address]  [place] [address]  Phone:  [phone]  Fax:  [phone]    Date: 03/26/2021    Patient: Mari Kinsey  YOB: 1955  MRN: [id number]     Time Calculation    Start time: 0900  Stop time: 0940 Time Calculation (min): 40 minutes         Chief Complaint: Shoulder Injury    Visit #: 3    SUBJECTIVE:  The patient reports she called her PCP for an appointment but was unable to get one until 12APR; her BP stayed low all day on Tuesday. She did check it before coming today and it was 110/64. Reports her shoulder is feeling better and feels the exercises are helping.     OBJECTIVE:  Current objective measures:   Blood Pressure:  First reading (0900):  174/74 HR: 68 --high for the patient  Second reading with cuff placement adjustment (0903): 144/71 HR 68  Posture: WFL today, no fwd head/shoulder.             Therapeutic Exercises (CPT 49768):     1. UBE, 2min fwd/2 min bkwd    2. Scap retractions, x10; 3 second hold    3. Rows, 5# each side; 2x15    4. ER/IR, pink TB x15 each, towel roll under arm    5. Wall washers, x20 each side    6. Flashers, pink TB, x20    14. UPOC:     15. Progress note:       Therapeutic Exercise Summary: HEP: Patient provided handout (created in HEP2go), to be uploaded, exercises include:   Scap Retractions, TB rows, TB ER/IR      Therapeutic Treatments and Modalities:     1. E Stim Unattended (CPT 52198), IFC and MHP to L shoulder    Time-based treatments/modalities:    Physical Therapy Timed Treatment Charges  Therapeutic exercise minutes (CPT 36797): 24 minutes      ASSESSMENT:   Response to treatment: the patient demonstrates improving tolerance to exercises; she shows improvement in ROM and overall mechanics with exercises. Blood pressure maintained much better today and she consistently reports feeling fine without lightheadedness. Will  continue to work on overall posture and strength to continue to improve functional mobility.     PLAN/RECOMMENDATIONS:   Plan for treatment: therapy treatment to continue next visit.  Planned interventions for next visit: continue with current treatment.

## 2021-03-30 ENCOUNTER — PHYSICAL THERAPY (OUTPATIENT)
Dept: PHYSICAL THERAPY | Facility: MEDICAL CENTER | Age: 66
End: 2021-03-30
Attending: INTERNAL MEDICINE
Payer: COMMERCIAL

## 2021-03-30 DIAGNOSIS — M25.512 CHRONIC LEFT SHOULDER PAIN: ICD-10-CM

## 2021-03-30 DIAGNOSIS — G89.29 CHRONIC LEFT SHOULDER PAIN: ICD-10-CM

## 2021-03-30 PROCEDURE — 97110 THERAPEUTIC EXERCISES: CPT

## 2021-03-30 NOTE — OP THERAPY DAILY TREATMENT
Outpatient Physical Therapy  DAILY TREATMENT     [facility] Outpatient Physical Therapy  [address]  [place] [address]  Phone:  [phone]  Fax:  [phone]    Date: 03/30/2021    Patient: Mari Kinsey  YOB: 1955  MRN: [id number]     Time Calculation    Start time: 0900  Stop time: 0940 Time Calculation (min): 40 minutes         Chief Complaint: Shoulder Problem    Visit #: 4    SUBJECTIVE:  The patient reports she is feeling better, she has been able to use her L arm more and has even been waking up on the L arm without as much pain.     OBJECTIVE:  Current objective measures: improving L shoulder ROM; still with a slight over activity of B UT; good scapular mechanics.           Therapeutic Exercises (CPT 97200):     1. UBE, 2min fwd/2 min bkwd    2. Scap retractions, x10; 3 second hold    3. Rows, 5# each side; 2x15    4. ER/IR, pink TB x15 each, towel roll under arm    5. Wall washers, 2x15 each side    6. Flashers, pink TB, x20    7. D1/2 pattern, 5# x10 each    8. Wall push ups, x10    14. UPOC:     15. Progress note:       Therapeutic Exercise Summary: HEP: Patient provided handout (created in HEP2go), to be uploaded, exercises include:   Scap Retractions, TB rows, TB ER/IR      Therapeutic Treatments and Modalities:     1. E Stim Unattended (CPT 99582), IFC and MHP to L shoulder    Time-based treatments/modalities:    Physical Therapy Timed Treatment Charges  Therapeutic exercise minutes (CPT 57241): 25 minutes    ASSESSMENT:   Response to treatment: the patient demonstrates good progress with good tolerance to treatment. She has improvements in ROM and requires less breaks with exercises. She also has better quality of motion without verbal ques for corrections. Will continue to improve ROM and strength/stability.     PLAN/RECOMMENDATIONS:   Plan for treatment: therapy treatment to continue next visit.  Planned interventions for next visit:  continue with current treatment.

## 2021-04-02 ENCOUNTER — APPOINTMENT (OUTPATIENT)
Dept: PHYSICAL THERAPY | Facility: MEDICAL CENTER | Age: 66
End: 2021-04-02
Attending: INTERNAL MEDICINE
Payer: COMMERCIAL

## 2021-04-05 ENCOUNTER — TELEPHONE (OUTPATIENT)
Dept: MEDICAL GROUP | Facility: MEDICAL CENTER | Age: 66
End: 2021-04-05

## 2021-04-05 NOTE — TELEPHONE ENCOUNTER
ESTABLISHED PATIENT PRE-VISIT PLANNING     Patient was NOT contacted to complete PVP.  ----------------------------------------------------------------------------------------------------    1.  Reviewed notes from the last few office visits within the medical group: Yes    2.  If any orders were placed at last visit or intended to be done for this visit (i.e. 6 mos follow-up), do we have Results/Consult Notes?        •  Labs - Labs ordered, NOT completed. Patient advised to complete prior to next appointment.       •  Imaging - Imaging ordered, completed and results are in chart.       •  Referrals - Referral ordered, patient was seen and consult notes are in chart. Care Teams updated  YES.    3. Is this appointment scheduled as a Hospital Follow-Up? No    4.  Immunizations were updated in Epic using Reconcile Outside Information activity? Yes. Immunizations reconciled through Web-IZ and outside sources. Immunization records are up to date.     5.  Patient is due for the following Health Maintenance Topics:   Health Maintenance Due   Topic Date Due   • PAP SMEAR  02/23/2020   • MAMMOGRAM  03/13/2020           ----------------------------------------------------------------------------------------------------  Pre-Visit Planning note has been created for the Provider and Medical Assistant to review prior to the patient's office appointment. Patient is NOT REQUIRED to follow-up on this note.   Outside information NOT reconciled using the [employer] feature. Per Selina May, the [employer] feature is down as of 02/09/2021 at 2:00pm. Will reconcile outside information at a later date.

## 2021-04-06 ENCOUNTER — PHYSICAL THERAPY (OUTPATIENT)
Dept: PHYSICAL THERAPY | Facility: MEDICAL CENTER | Age: 66
End: 2021-04-06
Attending: INTERNAL MEDICINE
Payer: COMMERCIAL

## 2021-04-06 DIAGNOSIS — M54.2 CERVICALGIA: ICD-10-CM

## 2021-04-06 PROCEDURE — 97110 THERAPEUTIC EXERCISES: CPT

## 2021-04-06 NOTE — OP THERAPY DAILY TREATMENT
Outpatient Physical Therapy  DAILY TREATMENT     [facility] Outpatient Physical Therapy  [address]  [place] [address]  Phone:  [phone]  Fax:  [phone]    Date: 04/06/2021    Patient: Mari Kinsey  YOB: 1955  MRN: [id number]     Time Calculation    Start time: 0900  Stop time: 0926 Time Calculation (min): 26 minutes         Chief Complaint: Shoulder Injury    Visit #: 5    SUBJECTIVE:  The patient reports she has been feeling a lot better, she has better ROM and doing the exercises and stretches. Still having a little trouble brushing hair and at night if she wants to sleep with her arm up or sleep on the L side.     OBJECTIVE:  Current objective measures: decreased L IR (L3); fair to good postural strength (improving),           Therapeutic Exercises (CPT 73753):     1. UBE, 2min fwd/2 min bkwd    2. Scap retractions, x10; 3 second hold    3. Rows, 10# each side; 2x15    4. ER/IR, 5# x15 each, towel roll under arm    5. Wall washers, 2x15 each side    6. Flashers, pink TB, x20    7. D1/2 pattern, 5# x10 each    8. Wall push ups, x10    9. Pec stretch, 2x30 seconds each time    10. B punch throughs (4KOR), x15 (7lb band)    14. UPOC:     15. Progress note:       Therapeutic Exercise Summary: HEP: Patient provided handout (created in HEP2go), to be uploaded, exercises include:   Scap Retractions, TB rows, TB ER/IR      Therapeutic Treatments and Modalities:     1. E Stim Unattended (CPT 52179), IFC and MHP to L shoulder    Time-based treatments/modalities:    Physical Therapy Timed Treatment Charges  Therapeutic exercise minutes (CPT 60153): 26 minutes        ASSESSMENT:   Response to treatment: the patient tolerated treatment well today, she demonstrates improvements in strength and endurance tolerated progressions across the board in exercises today. She has better standing posture without verbal cues and requires less cues for UT use today. Will  continue to strengthen and improve overall ROM to further improve functional mobility and completion of functional activities.     PLAN/RECOMMENDATIONS:   Plan for treatment: therapy treatment to continue next visit.  Planned interventions for next visit: continue with current treatment.

## 2021-04-09 ENCOUNTER — PHYSICAL THERAPY (OUTPATIENT)
Dept: PHYSICAL THERAPY | Facility: MEDICAL CENTER | Age: 66
End: 2021-04-09
Attending: INTERNAL MEDICINE
Payer: COMMERCIAL

## 2021-04-09 DIAGNOSIS — M25.512 CHRONIC LEFT SHOULDER PAIN: ICD-10-CM

## 2021-04-09 DIAGNOSIS — G89.29 CHRONIC LEFT SHOULDER PAIN: ICD-10-CM

## 2021-04-09 PROCEDURE — 97014 ELECTRIC STIMULATION THERAPY: CPT

## 2021-04-09 PROCEDURE — 97110 THERAPEUTIC EXERCISES: CPT

## 2021-04-09 NOTE — OP THERAPY DAILY TREATMENT
Outpatient Physical Therapy  DAILY TREATMENT     [facility] Outpatient Physical Therapy  [address]  [place] [address]  Phone:  [phone]  Fax:  [phone]    Date: 04/09/2021    Patient: Mari Kinsey  YOB: 1955  MRN: [id number]     Time Calculation    Start time: 0900  Stop time: 0942 Time Calculation (min): 42 minutes         Chief Complaint: Shoulder Injury    Visit #: 6    SUBJECTIVE:  The patient reports she has been doing really well, she went to put on a sweatshirt this morning and realized she could do so without any pain.     OBJECTIVE:  Current objective measures: decreased ER/IR on L shoulder compared to R, improving posture without cues          Therapeutic Exercises (CPT 40578):     1. UBE, 2min fwd/2 min bkwd    2. Scap retractions, x10; 3 second hold    3. Rows, 10# each side; 2x15    4. ER/IR, 5# x15 each, towel roll under arm    5. Wall washers, 2x15 each side    6. Flashers, pink TB, x20    7. D1/2 pattern, 5# x10 each    8. Wall push ups, x10    9. Pec stretch, 2x30 seconds each time    10. B punch throughs (4KOR), x15 (7lb band)    14. UPOC:     15. Progress note:       Therapeutic Exercise Summary: HEP: Patient provided handout (created in HEP2go), to be uploaded, exercises include:   Scap Retractions, TB rows, TB ER/IR      Therapeutic Treatments and Modalities:     1. E Stim Unattended (CPT 51174), IFC and MHP to L shoulder    Time-based treatments/modalities:    Physical Therapy Timed Treatment Charges  Therapeutic exercise minutes (CPT 65974): 26 minutes        ASSESSMENT:   Response to treatment: the patient tolerated treatment very well, she was able to complete increase repetitions without needing a break or having a decline in mechanics. She has made steady consistent progress and reports improvement in dressing, washing, and brushing hair. She reports no major complaints at home at this time but still has some limitations  with ER/IR. Will continue to strengthen and improve ROM as tolerated.     PLAN/RECOMMENDATIONS:   Plan for treatment: therapy treatment to continue next visit.  Planned interventions for next visit: continue with current treatment.

## 2021-04-12 ENCOUNTER — TELEPHONE (OUTPATIENT)
Dept: MEDICAL GROUP | Facility: MEDICAL CENTER | Age: 66
End: 2021-04-12

## 2021-04-12 ENCOUNTER — OFFICE VISIT (OUTPATIENT)
Dept: MEDICAL GROUP | Facility: MEDICAL CENTER | Age: 66
End: 2021-04-12
Payer: COMMERCIAL

## 2021-04-12 VITALS
HEIGHT: 62 IN | OXYGEN SATURATION: 95 % | SYSTOLIC BLOOD PRESSURE: 128 MMHG | BODY MASS INDEX: 33.13 KG/M2 | TEMPERATURE: 98.6 F | WEIGHT: 180 LBS | HEART RATE: 74 BPM | DIASTOLIC BLOOD PRESSURE: 78 MMHG

## 2021-04-12 DIAGNOSIS — E66.9 CLASS 1 OBESITY WITHOUT SERIOUS COMORBIDITY IN ADULT, UNSPECIFIED BMI, UNSPECIFIED OBESITY TYPE: ICD-10-CM

## 2021-04-12 DIAGNOSIS — I10 HYPERTENSION, UNSPECIFIED TYPE: ICD-10-CM

## 2021-04-12 PROCEDURE — 99213 OFFICE O/P EST LOW 20 MIN: CPT | Performed by: INTERNAL MEDICINE

## 2021-04-12 ASSESSMENT — PATIENT HEALTH QUESTIONNAIRE - PHQ9: CLINICAL INTERPRETATION OF PHQ2 SCORE: 0

## 2021-04-12 ASSESSMENT — FIBROSIS 4 INDEX: FIB4 SCORE: 1.23

## 2021-04-12 NOTE — PROGRESS NOTES
Subjective:      Mari Kinsey is a 65 y.o. female who presents with follow-up blood pressure          HPI     Hypertension follow-up blood pressure, off  lisinopril 10 mg since march 22 and blood pressure at home had been running lower using arm monitor   Has been working on her diet and eating healthier and has been able to lose weight, her  is also following a healthy nutrition program, avoiding processed foods, sweets, sodas, eating healthier natural foods.  Also keeping active with regular exercise and walking.  Shoulder PT going well, left shoulder pain improving going to physical therapy, improved range of motion with the left shoulder  Has had her Covid vaccine series  Dyslipidemia diet controlled  Hypothyroid on replacement most recent TSH 3.4 on levothyroxine 150 mcg  History of lymphoma followed by oncology, recent colonoscopy ileum biopsy follicular lymphoma has not had any diarrhea, change in bowel habits, blood in her stools, no fevers.  Weight loss related to change in nutrition and regular exercise    Current Outpatient Medications   Medication Sig Dispense Refill   • lisinopril (PRINIVIL) 10 MG Tab TAKE 1 TABLET BY MOUTH EVERY DAY 90 Tab 2   • levothyroxine (SYNTHROID) 150 MCG Tab TAKE 1 TAB BY MOUTH EVERY MORNING ON AN EMPTY STOMACH. 90 Tab 3   • Multiple Vitamins-Minerals (PRESERVISION AREDS 2 PO) Take  by mouth.     • B Complex Vitamins (B COMPLEX PO) Take  by mouth.     • vitamin D (CHOLECALCIFEROL) 1000 UNIT Tab Take 2,000 Units by mouth every day.       No current facility-administered medications for this visit.         s/p bunion surgery left foot  Sees podiatry   12/11 podiatry note  1/13 bunion surgery      Sleep apnea  2/11 PMA sleep study moderate obstructive sleep apnea syndrome with apnea-hypotony index 28, low saturation 83%, successful CPAP titration final pressure 9 cm with improvement in apnea-hypotony index to 1.5 with low saturation 91%  3/11  PMA note on cpap 9  3/12 PMA note on cpap 9  12/12 off cpap  12/14 back on cpap 9  12/20/15 on cpap 9  10/10/17sleep note continue cpap 9  11/20/17 echo normal LV size and function, EF 70%, mild tricuspid regurgitation, RVSP 35  10/19/18 sleep note on cpap 9  10/21/19 sleep note on cpap 9   10/21/20 sleep note on cpap 9     Skin lesion followed by dermatology Dr. Mondragon     Preventative health  12/9/11 tdap   9/2/14 zoster vaccine at [facility]  12/22/16 pneumovax  12/29/16 dexa LS -0.7,hip +0.8  2/23/17 pap per  gyn   8/4/17 completed hep b vaccine series   3/13/19 mammogram done [facility] negative stable benign 6 mm left 2:00 nodule  11/26/19 shingrix second at [facility]  12/31/19 vit d 25 start 4000 units   1/30/20 FIT negative  8/7/20 covid igg done at [facility]  3/22/21 colonoscopy per [facility] 3 mm benign-appearing sessile polyp pathology pending, pathology from the terminal ileum and 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, 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, will follow up with    3/28/21 covid moderna first     Microscopic hematuria  12/19/14 UA 2-5 RBC repeat in 1 month  1/21/15 UA trace blood   1/27/15 ultrasound renal negative  2/15 drop of urine one more time, send out UA evaluate microscopic hematuria , if still positive for urology  2/27/15 UA positive blood refer to urology  4/4/15 CT abdomen and pelvis with and without; retroperitoneal periaortic pericaval and retrocrural adenopathy, mesenteric adenopathy, no hydronephrosis or urolithiasis  10/16/15 urology note microscopic hematuria workup, CT urogram negative, cystoscopy bladder neck inflammation with cytology negative, hematuria has resolved; followup 6 months  4/13/16 [place]rology [place] note UA and cytology  12/28/18 UA   8/10/20 UA negative done at [facility]     Macular degeneration  Sees   12/20/18 referral to   2/13/19  eye exam macular degeneration, continue antioxidants and areds formula  7/7/20   eye exam     Lymphoma  4/4/15 CT abdomen and pelvis with and without; retroperitoneal periaortic pericaval and retrocrural adenopathy, mesenteric adenopathy, no hydronephrosis or urolithiasis  12/11/15 ,,alk phos 170,bili 1.2   12/20/15 ultrasound liver pending, repeat liver enzymes and secondary workup pending, CT abdomen and pelvis ordered at urgent care pending follow-up lymphadenopathy  12/24/15 ultrasound abdomen and biliary tree, prominent liver size, no focal mass, gallbladder contains numerous mobile stones, no thickening or pericholecystic fluid is noted, maximum diameter common bile duct 9.8 which is enlarged, 2.6 x 2.2 x 2.6 cm nodule hypoechoic area in the pancreatic body not well visualized because of gas  12/24/15 AST 34,ALT 76,GGT 76.alk phos 88,bili 0.5,acute hep panel negative, iron 45,%sat 13,ferritin 67,AMA 22 (20-25 equivocal),F-actin Ab IgG negative,RE neg,SPEP neg  12/26/15 CT with contrast pending follow-up adenopathy on previous CT and possible pancreatic lesion on ultrasound, referral to surgery for cholelithiasis.  1/12/16  surgery scheduled for laparoscopic cholecystectomy, laparoscopic biopsy of lymph nodes, conversion to open procedure to complete biopsy if necessary to rule out lymphoma  2/3/16 laparoscopic mesenteric lymph node biopsy limited sampling, partial involvement follicular lymphoma B-cell origin favor low-grade 1-2/3, flow cytometry CD10 monoclonal B cells, demonstrating lambda light chain expression and call expression of CD10 with CD19, CD20, negative for CD5 and CD43  2/19/16 CT/PET hypermetabolic activity within retrocrural, periaortic, aortocaval space, proximal pelvic adenopathy within the left common iliac chain, hypermetabolic adenopathy within mesentery  3/16/16  oncology consultation; 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 consistent with low-grade non-hodgkin's lymphoma follicular-type CD10 and CD20  positive, likely low-grade apparently indolent course, most likely will need observation, recommend complete workup including bone marrow biopsy, LDH, CMP,and once complete will calculate FLPI score  4/6/16 dr.reganti chairez note wbc 4.6,hgb 13.9,hct 43,plt 240; stage IIIa follicular lymphoma low-grade, recommend observation follow-up every 3 months first year and then every 6 months, CBC, CMP, LDH, beta-2 microglobulin and imaging studies as indicated, follow up 3 months  7/6/16 dr.reganti chairez note, wbc 7.7,hgb 13.6,hct 40,plt 304, ,beta 2 microglobulin 2.5 (1.1-2.4),uric 5.7;follow up 3 months  10/5/16 dr.reganti chairez note clinically no signs of recurrence, repeat CT chest, abdomen, pelvis in january, CMP, LDH today, follow-up 3 months  12/27/16 wbc 7.7 (49%N,30%L)  12/29/16 CT abdomen and pelvis with; interval resolution of retrocrural, para-aortic, paracaval, and mesenteric adenopathy, no adenopathy appreciated in pelvis, stable 2 cm or less diameter nodules right middle lobe  1/17/17 dr.reganti chairez note, doing well with recent CT showing no evidence of adenopathy, repeat labs prior to next visit CBC, CMP, LDH, beta-2 microglobulin, follow-up 6 months  7/12/17 cbc,cmp normal,,beta 2 microglobulin  7/18/17 dr.reganti chairez note no evidence disease progression  12/29/17 wbc 4.3 (61%N,18%L)  1/16/18 dr.reganti chairez note no evidence of disease progression, recent labs cbc,cmp,ldh normal, follow-up 6 months  7/30/18 dr.reganti chairez note stable no recurrence of disease, labs ordered  1/28/19 dr.reganti chairez note repeat labs follow up 6 months  8/7/19 wbc 8.0,hgb 13,hct 43,,cmp negative  8/14/19 dr.reganti chairez note no clinical symptoms, laboratory work reviewed, follow-up 1 year  8/14/20 dr.reganti chairez note no signs of disease recurrence or progression, continue observation  3/22/21 colonoscopy per [facility] 3 mm benign-appearing sessile polyp pathology pending,  pathology from the terminal ileum and 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, 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, will follow up with       Hypothyroid  9/10 tsh 1.9 on levothyroxine 112 mcg  12/9/11 tsh 4.1 on levothyroxine 112 mcg; increase to levothyroxine 125 mcg  2/12 tsh 2.4 cont levothyroxine 125 mcg  1/13 tsh 3.5 on levothyroxine 125 mcg  2/28/14 tsh 3.9 on levothyroxine 125 mcg  12/19/14 tsh 3.8 on levothyroxine 125 mcg  12/24/15 tsh 2.0 on levothyroxine 125 mcg  12/22/16 tsh 3.3 on levothyroxine 125 mcg   12/29/17 tsh 2.6 on levothyroxine 125 mcg  12/28/18 tsh 6.5 on levothyroxine 125 mcg, change to levothyroxine 137 mcg, repeat tsh 6 weeks  2/14/19 tsh 2.8 on levothyroxine 137 mcg   12/31/19 tsh 9.3 on levothyroxine 137 mcg taking daily, change to 150 mcg daily repeat tsh in 6 weeks ordered  2/4/20 tsh 2.4 on levothyroxine 150 mcg  8/7/20 tsh 2.9 on levothyroxine 150 mcg done at [facility]  3/12/21 tsh 3.4 on levothyroxine 150 mcg done at [facility]     hypertension  7/9/20 start lisinopril 10 mg  8/7/20 on lisinopril 10 mg urine mac<30 done at [facility]     Dyslipidemia  9/10 chol 222,trig 144,hdl 48,ldl 144  12/9/11 chol 230,trig 106,hdl 55,ldl 154  1/13 chol 224,trig 109,hdl 46,ldl 156; 10 yr risk 4%  2/28/14 chol 226,trig 127,hdl 64,ldl 137  12/19/14 chol 205,trig 111,hdl 51,ldl 132  4/23/15 chol 212,trig 106,hdl 58,ldl 133  12/24/15 chol 212,trig 56,hdl 62,ldl 139  12/27/16 chol 231,trig 72,hdl 65,ldl 152  12/29/17 chol 208,trig 70,hdl 63,ldl 131  12/28/18 chol 219,trig 199,hdl 57,ldl 122  12/31/19 chol 221,trig 178,hdl 57,ldl 128; 10 year risk 5.3%  8/7/20 chol 233,trig 143,hdl 56,ldl 150 done at [facility], declines statin  3/12/21 chol 254,trig 121,hdl 63,ldl 166 done at [facility]; 10 year risk 5.4%, declined statin, will work on diet, exercise, repeat labs 6 months printed to mail to her      Deviated nasal septum     decrease gfr  12/23/15 bun 13,creat 0.87,GFR>60  3/16/16 bun 18,creat  1.07,GFR>60  7/6/16 bun 20,creat 0.9,GFR 52  10/5/16 bun 14,creat 0.8,GFR 59  12/27/16 bun 17,crat 0.9,GFR>60  12/29/17 bun 17,creat 1.0,GFR>60  12/29/17 bun 17,creat 0.99,GFR 57  12/28/18 bun 13,creat 0.7,GFR>60  12/31/19 bun 15,creat 0.9,GFR>60  8/10/20 bun 14,creat 0.88,GFR>60 done at [facility]  3/12/21 bun 18,creat 0.86 done at [facility]     Allergic rhinitis  5/14/14  allergy note tolerating immunotherapy  8/1/15  allergy note on immunotherapy  9/6/16  allergy note allergic rhinitis tolerating immunotherapy  3/19/18  allergy note off immunotherapy x1 year, symptoms restarted this past winter, patient would like to restart immunotherapy  10/30/18  allergy note stable on immunotherapy  10/22/19  allergy note continue immunotherapy  12/23/19 monthly shots per   12/24/20 monthly shots per           Patient Active Problem List   Diagnosis   • Hypothyroid   • Preventative health care   • S/p bunion surgery left foot   • Deviated nasal septum   • Dyslipidemia   • Sleep apnea   • Skin lesion   • Family history of breast cancer in sister   • Macular degeneration   • Obesity   • Microscopic hematuria   • History of lymphoma   • Decreased GFR   • Allergic rhinitis   • Hypertension     Depression Screening    Little interest or pleasure in doing things?  0 - not at all  Feeling down, depressed , or hopeless? 0 - not at all  Patient Health Questionnaire Score: 0             Health Maintenance Summary                PAP SMEAR Overdue 2/23/2020      Done 2/23/2017 THINPREP PAP WITH HPV     Patient has more history with this topic...    MAMMOGRAM Overdue 3/13/2020      Done 3/13/2019 [id number]     Patient has more history with this topic...    COVID-19 Vaccine Next Due 4/25/2021      Done 3/28/2021 Imm Admin: Moderna SARS-CoV-2 Vaccine    IMM DTaP/Tdap/Td Vaccine Next Due 12/9/2021      Done 12/9/2011 Imm Admin: Tdap Vaccine    COLONOSCOPY Next Due  3/22/2031      Done 3/22/2021 REFERRAL TO GI FOR COLONOSCOPY     Patient has more history with this topic...          Patient Care Team:  Kenneth Reinoso M.D. as PCP - General Fried as Respiratory Therapist (DME Supplier)  Emily Rodriguez, PT, DPT as Physical Therapist (Physical Therapy)         ROS       Objective:          Physical Exam  Vitals and nursing note reviewed.   Constitutional:       Appearance: Normal appearance.   HENT:      Head: Normocephalic and atraumatic.      Right Ear: External ear normal.   Cardiovascular:      Rate and Rhythm: Normal rate and regular rhythm.      Heart sounds: Normal heart sounds. No murmur.   Pulmonary:      Effort: No respiratory distress.      Breath sounds: Normal breath sounds.   Abdominal:      General: There is no distension.   Musculoskeletal:      Cervical back: Neck supple.   Skin:     General: Skin is warm.   Neurological:      General: No focal deficit present.      Mental Status: She is alert.   Psychiatric:         Behavior: Behavior normal.                 Assessment/Plan:        Assessment  #1  Hypertension blood pressure is improved, currently off lisinopril because she has been doing an excellent job with weight loss, nutrition, and exercise    #2 dyslipidemia diet controlled has declined statin therapy most recent lipid panel 3/12/21 chol 254,trig 121,hdl 63,ldl 166 done at [facility]; 10 year risk 5.4%, has declined statin, has been working on improving her nutrition and exercise    #3 history lymphoma, has seen hematology oncology continued observation recent colonoscopy for GI, terminal ileum biopsies duodenal type of follicular lymphoma asymptomatic    #4 hypothyroid 3/12/21 tsh 3.4 on levothyroxine 150 mcg done at [facility]    #5 sleep apnea on CPAP    Plan  #1 reviewed labs from 3/12/21    #2 follow-up oncology    #3 mammogram and bone density will be done at [facility]    #4 old records  dermatology     #5 continue off lisinopril check blood  pressure and record notify me if systolic above 140 or diastolic above 90 consistently, she has done a tremendous job with her nutrition program, exercise, and weight loss, she is to be congratulated at her outstanding work    #6  Repeat lipid panel 6 months she already has a test slip    #7 has had Covid vaccine    #8 follow-up for annual exam    #9 follow-up sleep apnea this fall, perhaps she can even get off the CPAP machine with her weight loss

## 2021-04-12 NOTE — LETTER
[employer]  Kenneth Reinoso M.D.  [address]  [place] [address]  Fax: [phone]   Authorization for Release/Disclosure of   Protected Health Information   Name: FLORENCIO HAMILTON : 1955 SSN: [id number]   Address: [address] [place] [address]  [place] [address] Phone:    [phone] (home)    I authorize the entity listed below to release/disclose the PHI below to:   [id number] [facility]/Kenneth Reinoso M.D. and Kenneth Reinoso M.D.   Provider or Entity Name:                                                       Dr Mondragon (DER[facility])   Address   City, [facility]   Phone:      Fax:                        [phone]   Reason for request: continuity of care   Information to be released: Past 12 mo of records   [  ] LAST COLONOSCOPY,  including any PATH REPORT and follow-up  [  ] LAST FIT/COLOGUARD RESULT [  ] LAST DEXA  [  ] LAST MAMMOGRAM  [  ] LAST PAP  [  ] LAST LABS [  ] RETINA EXAM REPORT  [  ] IMMUNIZATION RECORDS  [ x ] Release all info      [  ] Check here and initial the line next to each item to release ALL health information INCLUDING  _____ Care and treatment for drug and / or alcohol abuse  _____ HIV testing, infection status, or AIDS  _____ Genetic Testing    DATES OF SERVICE OR TIME PERIOD TO BE DISCLOSED: _____________  I understand and acknowledge that:  * This Authorization may be revoked at any time by you in writing, except if your health information has already been used or disclosed.  * Your health information that will be used or disclosed as a result of you signing this authorization could be re-disclosed by the recipient. If this occurs, your re-disclosed health information may no longer be protected by State or Federal laws.  * You may refuse to sign this Authorization. Your refusal will not affect your ability to obtain treatment.  * This Authorization becomes effective upon signing and will  on (date) __________.      If no date is indicated, this Authorization will   one (1) year from the signature date.    Name: Mari Kinsey    Signature:                      Continuity of Care   Date:     4/13/2021       PLEASE FAX REQUESTED RECORDS BACK TO: [phone]

## 2021-04-16 ENCOUNTER — PHYSICAL THERAPY (OUTPATIENT)
Dept: PHYSICAL THERAPY | Facility: MEDICAL CENTER | Age: 66
End: 2021-04-16
Attending: INTERNAL MEDICINE
Payer: COMMERCIAL

## 2021-04-16 DIAGNOSIS — M25.512 CHRONIC LEFT SHOULDER PAIN: ICD-10-CM

## 2021-04-16 DIAGNOSIS — G89.29 CHRONIC LEFT SHOULDER PAIN: ICD-10-CM

## 2021-04-16 PROCEDURE — 97110 THERAPEUTIC EXERCISES: CPT

## 2021-04-16 NOTE — OP THERAPY DISCHARGE SUMMARY
Outpatient Physical Therapy  DISCHARGE SUMMARY NOTE      [facility]  [address]  [place] [address]  Phone:  [phone]  Fax:  [phone]    Date of Visit: 04/16/2021    Patient: Mari Kinsey  YOB: 1955  MRN: [id number]     Referring Provider: Kenneth Reinoso M.D.  [address]  [place] [address]   Referring Diagnosis Pain in unspecified shoulder [M25.519]         Functional Assessment Used        Your patient is being discharged from Physical Therapy with the following comments:   · Goals met    Comments:  The patient made excellent progress in management of shoulder pain, she demonstrates WFL ROM, WFL strength, and better than baseline function.      Limitations Remaining:  None.    Recommendations:  Continue with HEP and follow up with PCP if needed.     Emily Rodriguez, PT, DPT    Date: 4/16/2021

## 2021-04-16 NOTE — OP THERAPY DAILY TREATMENT
Outpatient Physical Therapy  DAILY TREATMENT     [facility]  [address]  [place] [address]  Phone:  [phone]  Fax:  [phone]    Date: 04/16/2021    Patient: Mari Kinsey  YOB: 1955  MRN: [id number]     Time Calculation    Start time: 1100  Stop time: 1126 Time Calculation (min): 26 minutes         Chief Complaint: Shoulder Injury    Visit #: 7    SUBJECTIVE:  The patient reports she has been doing really well, she essentially has no pain and is back at full function.     OBJECTIVE:  Current objective measures: WFL ROM, WFL strength, good understanding/demonstration of HEP.           Therapeutic Exercises (CPT 63021):     1. UBE, 3 min fwd/3 min bkwd    2. Review of HEP      Time-based treatments/modalities:    Physical Therapy Timed Treatment Charges  Therapeutic exercise minutes (CPT 97818): 26 minutes      ASSESSMENT:   Response to treatment: the patient has made excellent overall progress, she is better than baseline function and reports feeling stronger and having better mobility. The patient is to discharged from PT at this time with instructions to follow up with PCP if needed.     PLAN/RECOMMENDATIONS:   Plan for treatment: therapy treatment to continue next visit.  Planned interventions for next visit: continue with current treatment.

## 2021-05-04 ENCOUNTER — OFFICE VISIT (OUTPATIENT)
Dept: SLEEP MEDICINE | Facility: MEDICAL CENTER | Age: 66
End: 2021-05-04
Payer: COMMERCIAL

## 2021-05-04 VITALS
BODY MASS INDEX: 31.71 KG/M2 | DIASTOLIC BLOOD PRESSURE: 82 MMHG | OXYGEN SATURATION: 91 % | WEIGHT: 179 LBS | HEART RATE: 86 BPM | RESPIRATION RATE: 16 BRPM | HEIGHT: 63 IN | SYSTOLIC BLOOD PRESSURE: 124 MMHG

## 2021-05-04 DIAGNOSIS — G47.33 OSA (OBSTRUCTIVE SLEEP APNEA): ICD-10-CM

## 2021-05-04 PROCEDURE — 99213 OFFICE O/P EST LOW 20 MIN: CPT | Performed by: FAMILY MEDICINE

## 2021-05-04 ASSESSMENT — FIBROSIS 4 INDEX: FIB4 SCORE: 1.23

## 2021-05-04 NOTE — PATIENT INSTRUCTIONS
"    CPAP and BPAP Information  CPAP and BPAP are methods of helping a person breathe with the use of air pressure. CPAP stands for \"continuous positive airway pressure.\" BPAP stands for \"bi-level positive airway pressure.\" In both methods, air is blown through your nose or mouth and into your air passages to help you breathe well.  CPAP and BPAP use different amounts of pressure to blow air. With CPAP, the amount of pressure stays the same while you breathe in and out. With BPAP, the amount of pressure is increased when you breathe in (inhale) so that you can take larger breaths. Your health care provider will recommend whether CPAP or BPAP would be more helpful for you.  Why are CPAP and BPAP treatments used?  CPAP or BPAP can be helpful if you have:  · Sleep apnea.  · Chronic obstructive pulmonary disease (COPD).  · Heart failure.  · Medical conditions that weaken the muscles of the chest including muscular dystrophy, or neurological diseases such as amyotrophic lateral sclerosis (ALS).  · Other problems that cause breathing to be weak, abnormal, or difficult.  CPAP is most commonly used for obstructive sleep apnea (ALLEN) to keep the airways from collapsing when the muscles relax during sleep.  How is CPAP or BPAP administered?  Both CPAP and BPAP are provided by a small machine with a flexible plastic tube that attaches to a plastic mask. You wear the mask. Air is blown through the mask into your nose or mouth. The amount of pressure that is used to blow the air can be adjusted on the machine. Your health care provider will determine the pressure setting that should be used based on your individual needs.  When should CPAP or BPAP be used?  In most cases, the mask only needs to be worn during sleep. Generally, the mask needs to be worn throughout the night and during any daytime naps. People with certain medical conditions may also need to wear the mask at other times when they are awake. Follow instructions from " your health care provider about when to use the machine.  What are some tips for using the mask?    · Because the mask needs to be snug, some people feel trapped or closed-in (claustrophobic) when first using the mask. If you feel this way, you may need to get used to the mask. One way to do this is by holding the mask loosely over your nose or mouth and then gradually applying the mask more snugly. You can also gradually increase the amount of time that you use the mask.  · Masks are available in various types and sizes. Some fit over your mouth and nose while others fit over just your nose. If your mask does not fit well, talk with your health care provider about getting a different one.  · If you are using a mask that fits over your nose and you tend to breathe through your mouth, a chin strap may be applied to help keep your mouth closed.  · The CPAP and BPAP machines have alarms that may sound if the mask comes off or develops a leak.  · If you have trouble with the mask, it is very important that you talk with your health care provider about finding a way to make the mask easier to tolerate. Do not stop using the mask. Stopping the use of the mask could have a negative impact on your health.  What are some tips for using the machine?  · Place your CPAP or BPAP machine on a secure table or stand near an electrical outlet.  · Know where the on/off switch is located on the machine.  · Follow instructions from your health care provider about how to set the pressure on your machine and when you should use it.  · Do not eat or drink while the CPAP or BPAP machine is on. Food or fluids could get pushed into your lungs by the pressure of the CPAP or BPAP.  · Do not smoke. Tobacco smoke residue can damage the machine.  · For home use, CPAP and BPAP machines can be rented or purchased through home health care companies. Many different brands of machines are available. Renting a machine before purchasing may help you  find out which particular machine works well for you.  · Keep the CPAP or BPAP machine and attachments clean. Ask your health care provider for specific instructions.  Get help right away if:  · You have redness or open areas around your nose or mouth where the mask fits.  · You have trouble using the CPAP or BPAP machine.  · You cannot tolerate wearing the CPAP or BPAP mask.  · You have pain, discomfort, and bloating in your abdomen.  Summary  · CPAP and BPAP are methods of helping a person breathe with the use of air pressure.  · Both CPAP and BPAP are provided by a small machine with a flexible plastic tube that attaches to a plastic mask.  · If you have trouble with the mask, it is very important that you talk with your health care provider about finding a way to make the mask easier to tolerate.  This information is not intended to replace advice given to you by your health care provider. Make sure you discuss any questions you have with your health care provider.  Document Released: 09/15/2005 Document Revised: 04/08/2020 Document Reviewed: 11/06/2017  Elsevier Patient Education © 2020 [employer]

## 2021-05-04 NOTE — PROGRESS NOTES
[facility] Follow Up Note     Date: 5/4/2021 / Time: 3:32 PM    Patient ID:   Name:             Mari Kinsey   YOB: 1955  Age:                 65 y.o.  female   MRN:               [id number]      Thank you for requesting a sleep medicine consultation on Mari Kinsey at the sleep center. She presents today with the chief complaints of ALLEN follow up.     HISTORY OF PRESENT ILLNESS:       Pt is currently on CPAP 9 .New CPAP was ordered on her last visit.  She denies significant change in medical or sleep history.  She goes to sleep around 11 pm and wakes up around 7 am. She is getting about 7-8 hrs of sleep on a good night and about 6 hr of sleep on a bad night. The bad nights are about couplenights per week. Overall,  She does finds her sleep refreshing.She does not take regular naps. She denies any symptoms of RLS, narcolepsy or any symptoms to suggest parasomnias such as nightmares, sleep walking or acting out of dreams.She is using CPAP most days of the week. Pt reports 7 hrs of average nightly use of CPAP. Pt denies snoring, gasping,choking.Pt also denies significant mask leak that is interfering with sleep. The 30 day compliance was downloaded which shows adequate compliance with more that 4 hr usage about 100%. The AHI is has improved to 0.2/hr. The mask leak is normal. The symptoms of excessive daytime, snoring and gasping has improved with the therapy.       REVIEW OF SYSTEMS:       Constitutional: Denies fevers, Denies weight changes  Eyes: Denies changes in vision, no eye pain  Ears/Nose/Throat/Mouth: Denies nasal congestion or sore throat   Cardiovascular: Denies chest pain or palpitations   Respiratory: Denies shortness of breath , Denies cough  Gastrointestinal/Hepatic: Denies abdominal pain, nausea,  Musculoskeletal/Rheum: Denies  joint pain and swelling   Skin/Breast: Denies rash,   Neurological: Denies headache, confusion,   Psychiatric: denies mood disorder    Sleep: denies snoring     Comprehensive review of systems form is reviewed with the patient and is attached in the EMR.     PMH:  has a past medical history of Chickenpox, Dental disorder, Ethiopian measles, Hypothyroidism, Influenza, Mumps, ALLEN (obstructive sleep apnea), Sleep apnea, Snoring, Thyroid disease, and Tonsillitis.  MEDS:   Current Outpatient Medications:   •  levothyroxine (SYNTHROID) 150 MCG Tab, TAKE 1 TAB BY MOUTH EVERY MORNING ON AN EMPTY STOMACH., Disp: 90 Tab, Rfl: 3  •  Multiple Vitamins-Minerals (PRESERVISION AREDS 2 PO), Take  by mouth., Disp: , Rfl:   •  B Complex Vitamins (B COMPLEX PO), Take  by mouth., Disp: , Rfl:   •  vitamin D (CHOLECALCIFEROL) 1000 UNIT Tab, Take 2,000 Units by mouth every day., Disp: , Rfl:   ALLERGIES:   Allergies   Allergen Reactions   • Penicillins Rash     SURGHX:   Past Surgical History:   Procedure Laterality Date   • KALE BY LAPAROSCOPY  2/3/2016    Procedure: KALE BY LAPAROSCOPY;  Surgeon: Tricia Farah M.D.;  Location: SURGERY SAME DAY [facility]S;  Service:    • LYMPH NODE SAMPLING  2/3/2016    Procedure: LYMPH NODE SAMPLING EXCISION, BIOPSY;  Surgeon: Tricia Farah M.D.;  Location: SURGERY SAME DAY [facility];  Service:    • BONE GRAFT      at 19 years of age   • BUNIONECTOMY     • HYSTEROSCOPY WITH VIDEO DIAGNOSTIC     • NASAL SEPTAL RECONSTRUCTION     • OTHER      bone graft to ;hand   • TONSILLECTOMY       SOCHX:  reports that she is a non-smoker but has been exposed to tobacco smoke. She has never used smokeless tobacco. She reports that she does not drink alcohol and does not use drugs..  FH:   Family History   Problem Relation Age of Onset   • Heart Disease Father    • Arthritis Mother         osteoporosis   • Sleep Apnea Neg Hx          Physical Exam:  Vitals/ General Appearance:   Weight/BMI: Body mass index is 31.71 kg/m².  /82 (BP Location: Right arm, Patient Position: Sitting, BP Cuff Size: Adult)   Pulse 86   Resp 16   Ht 1.6 m  "(5' 3\")   Wt 81.2 kg (179 lb)   SpO2 91%   Vitals:    05/04/21 1510   BP: 124/82   BP Location: Right arm   Patient Position: Sitting   BP Cuff Size: Adult   Pulse: 86   Resp: 16   SpO2: 91%   Weight: 81.2 kg (179 lb)   Height: 1.6 m (5' 3\")       Pt. is alert and oriented to time, place and person. Cooperative and in no apparent distress.       Constitutional: Alert, no distress, well-groomed.  Skin: No rashes in visible areas.  Eye: Round. Conjunctiva clear, lids normal. No icterus.   ENMT: Lips pink without lesions, good dentition, moist mucous membranes. Phonation normal.  Neck: No masses, no thyromegaly. Moves freely without pain.  CV: Pulse as reported by patient  Respiratory: Unlabored respiratory effort, no cough or audible wheeze  Psych: Alert and oriented x3, normal affect and mood.     ASSESSMENT AND PLAN     1. Sleep Apnea .     She is urged to avoid supine sleep, weight gain and alcoholic beverages since all of these can worsen sleep apnea. She is cautioned against drowsy driving. If She feels sleepy while driving, She must pull over for a break/nap, rather than persist on the road, in the interest of She own safety and that of others on the road.   Plan   - Continue CPAP 9 cm with quatro fx mask    - supplies are refilled    - compliance download was reviewed and discussed with the pt   - compliance was reinforced     2. Regarding treatment of other past medical problems and general health maintenance,  She is urged to follow up with PCP.    "

## 2021-10-10 ENCOUNTER — TELEPHONE (OUTPATIENT)
Dept: MEDICAL GROUP | Facility: MEDICAL CENTER | Age: 66
End: 2021-10-10

## 2021-10-10 DIAGNOSIS — E78.5 DYSLIPIDEMIA: ICD-10-CM

## 2021-10-10 DIAGNOSIS — E55.9 VITAMIN D DEFICIENCY: ICD-10-CM

## 2021-10-10 DIAGNOSIS — E03.8 OTHER SPECIFIED HYPOTHYROIDISM: Chronic | ICD-10-CM

## 2021-10-10 RX ORDER — LEVOTHYROXINE SODIUM 0.15 MG/1
150 TABLET ORAL
Qty: 90 TABLET | Refills: 1 | Status: SHIPPED | OUTPATIENT
Start: 2021-10-10 | End: 2022-01-04

## 2021-10-11 NOTE — TELEPHONE ENCOUNTER
Please notify the patient that she is due for her cholesterol blood test, I will place the orders for the fasting blood test in the computer system

## 2021-10-11 NOTE — TELEPHONE ENCOUNTER
Pt ins. Will change to [facility] in Nov. As soon as it does, she will do her lab before her NOV appt with you.

## 2021-11-01 ENCOUNTER — TELEPHONE (OUTPATIENT)
Dept: MEDICAL GROUP | Facility: MEDICAL CENTER | Age: 66
End: 2021-11-01

## 2021-11-01 NOTE — TELEPHONE ENCOUNTER
ANNUAL WELLNESS VISIT PRE-VISIT PLANNING    Patient was NOT contacted to complete the Annual Wellness Visit Pre-Visit Planning. Information was reviewed in the chart.     1.  Reviewed notes from the last office visit: Yes    2.  If any orders were ordered or intended to be done prior to visit (i.e. 6 mos follow-up), do we have results/consult notes or has patient scheduled?   · Labs - Labs ordered, NOT completed. Patient advised to complete prior to next appointment. A Ceannate message was sent to the patient reminding of scheduled appointment and outstanding orders.   Note: If patient appointment is for lab review and patient did not complete labs, check with provider if OK to reschedule patient until labs completed.  · Imaging - Imaging ordered, completed and results are in chart.  · Referrals - Referral ordered, patient was seen and consult notes are in chart. Care Teams updated  YES.    3.  Immunizations were updated in Epic using Reconcile Outside Information activity? Yes. Immunizations queried through Web-IZ and reconciled from outside sources. Immunization records are up to date.        •  Is patient due for Tdap? NO       •  Is patient due for Shingrix? NO    4.  Patient is due for the following Health Maintenance Topics:   Health Maintenance Due   Topic Date Due   • PAP SMEAR  02/23/2020   • MAMMOGRAM  03/13/2020     5.  Reviewed/Updated the following:  · Preferred Pharmacy? Yes  · Preferred Lab? Yes  · Preferred Communication? Yes  · Allergies? Yes. No new allergies to reconcile from outside sources.   · Medications? YES. Was Abstract Encounter opened and chart updated? NO. No new medications to reconcile form outside sources.  · Social History? Yes  · Family History (document living status of immediate family members and if + hx of cancer, diabetes, hypertension, hyperlipidemia, heart attack, stroke) No    6.  Care Team Updated:  · DME Company (gait device, O2, CPAP, etc.): YES  · Other Specialists (eye  doctor, derm, GYN, cardiology, endo, etc): N\A    7.  Patient was not advised: “This is a free wellness visit. The provider will screen for medical conditions to help you stay healthy. If you have other concerns to address you may be asked to discuss these at a separate visit or there may be an additional fee.”     8.  AHA ([employer]uls[employer]) form printed for Provider? Email sent to [facility] requesting form           This Pre-Visit Planning note has been created for the Provider and Medical Assistant to review prior to the patient's office appointment.   Patient is NOT REQUIRED to follow-up on this note.

## 2021-11-05 ENCOUNTER — HOSPITAL ENCOUNTER (OUTPATIENT)
Dept: LAB | Facility: MEDICAL CENTER | Age: 66
End: 2021-11-05
Attending: INTERNAL MEDICINE
Payer: MEDICARE

## 2021-11-05 DIAGNOSIS — Z00.00 PREVENTATIVE HEALTH CARE: Chronic | ICD-10-CM

## 2021-11-05 DIAGNOSIS — E78.5 DYSLIPIDEMIA: ICD-10-CM

## 2021-11-05 PROBLEM — R94.4 DECREASED GFR: Status: RESOLVED | Noted: 2017-12-31 | Resolved: 2021-11-05

## 2021-11-05 LAB
25(OH)D3 SERPL-MCNC: 45 NG/ML (ref 30–100)
ALBUMIN SERPL BCP-MCNC: 4.1 G/DL (ref 3.2–4.9)
ALBUMIN/GLOB SERPL: 1.4 G/DL
ALP SERPL-CCNC: 74 U/L (ref 30–99)
ALT SERPL-CCNC: 12 U/L (ref 2–50)
ANION GAP SERPL CALC-SCNC: 7 MMOL/L (ref 7–16)
AST SERPL-CCNC: 15 U/L (ref 12–45)
BASOPHILS # BLD AUTO: 0.6 % (ref 0–1.8)
BASOPHILS # BLD: 0.04 K/UL (ref 0–0.12)
BILIRUB SERPL-MCNC: 0.4 MG/DL (ref 0.1–1.5)
BUN SERPL-MCNC: 15 MG/DL (ref 8–22)
CALCIUM SERPL-MCNC: 9.1 MG/DL (ref 8.4–10.2)
CHLORIDE SERPL-SCNC: 101 MMOL/L (ref 96–112)
CHOLEST SERPL-MCNC: 213 MG/DL (ref 100–199)
CO2 SERPL-SCNC: 30 MMOL/L (ref 20–33)
CREAT SERPL-MCNC: 0.87 MG/DL (ref 0.5–1.4)
EOSINOPHIL # BLD AUTO: 0.71 K/UL (ref 0–0.51)
EOSINOPHIL NFR BLD: 10 % (ref 0–6.9)
ERYTHROCYTE [DISTWIDTH] IN BLOOD BY AUTOMATED COUNT: 45.1 FL (ref 35.9–50)
EST. AVERAGE GLUCOSE BLD GHB EST-MCNC: 108 MG/DL
FASTING STATUS PATIENT QL REPORTED: NORMAL
GLOBULIN SER CALC-MCNC: 2.9 G/DL (ref 1.9–3.5)
GLUCOSE SERPL-MCNC: 92 MG/DL (ref 65–99)
HBA1C MFR BLD: 5.4 % (ref 4–5.6)
HCT VFR BLD AUTO: 41.2 % (ref 37–47)
HDLC SERPL-MCNC: 59 MG/DL
HGB BLD-MCNC: 13.3 G/DL (ref 12–16)
IMM GRANULOCYTES # BLD AUTO: 0.02 K/UL (ref 0–0.11)
IMM GRANULOCYTES NFR BLD AUTO: 0.3 % (ref 0–0.9)
LDLC SERPL CALC-MCNC: 127 MG/DL
LYMPHOCYTES # BLD AUTO: 2.02 K/UL (ref 1–4.8)
LYMPHOCYTES NFR BLD: 28.4 % (ref 22–41)
MCH RBC QN AUTO: 31 PG (ref 27–33)
MCHC RBC AUTO-ENTMCNC: 32.3 G/DL (ref 33.6–35)
MCV RBC AUTO: 96 FL (ref 81.4–97.8)
MONOCYTES # BLD AUTO: 0.55 K/UL (ref 0–0.85)
MONOCYTES NFR BLD AUTO: 7.7 % (ref 0–13.4)
NEUTROPHILS # BLD AUTO: 3.77 K/UL (ref 2–7.15)
NEUTROPHILS NFR BLD: 53 % (ref 44–72)
NRBC # BLD AUTO: 0 K/UL
NRBC BLD-RTO: 0 /100 WBC
PLATELET # BLD AUTO: 317 K/UL (ref 164–446)
PMV BLD AUTO: 9.5 FL (ref 9–12.9)
POTASSIUM SERPL-SCNC: 4 MMOL/L (ref 3.6–5.5)
PROT SERPL-MCNC: 7 G/DL (ref 6–8.2)
RBC # BLD AUTO: 4.29 M/UL (ref 4.2–5.4)
SODIUM SERPL-SCNC: 138 MMOL/L (ref 135–145)
TRIGL SERPL-MCNC: 135 MG/DL (ref 0–149)
TSH SERPL DL<=0.005 MIU/L-ACNC: 7.72 UIU/ML (ref 0.38–5.33)
WBC # BLD AUTO: 7.1 K/UL (ref 4.8–10.8)

## 2021-11-05 PROCEDURE — 83036 HEMOGLOBIN GLYCOSYLATED A1C: CPT

## 2021-11-05 PROCEDURE — 85025 COMPLETE CBC W/AUTO DIFF WBC: CPT

## 2021-11-05 PROCEDURE — 84443 ASSAY THYROID STIM HORMONE: CPT

## 2021-11-05 PROCEDURE — 80053 COMPREHEN METABOLIC PANEL: CPT

## 2021-11-05 PROCEDURE — 80061 LIPID PANEL: CPT

## 2021-11-05 PROCEDURE — 36415 COLL VENOUS BLD VENIPUNCTURE: CPT

## 2021-11-05 PROCEDURE — 82306 VITAMIN D 25 HYDROXY: CPT

## 2021-11-08 ENCOUNTER — OFFICE VISIT (OUTPATIENT)
Dept: MEDICAL GROUP | Facility: MEDICAL CENTER | Age: 66
End: 2021-11-08
Payer: MEDICARE

## 2021-11-08 VITALS
HEART RATE: 65 BPM | DIASTOLIC BLOOD PRESSURE: 78 MMHG | WEIGHT: 188.2 LBS | OXYGEN SATURATION: 97 % | SYSTOLIC BLOOD PRESSURE: 118 MMHG | HEIGHT: 63 IN | TEMPERATURE: 97.9 F | BODY MASS INDEX: 33.35 KG/M2

## 2021-11-08 DIAGNOSIS — Z12.31 ENCOUNTER FOR SCREENING MAMMOGRAM FOR BREAST CANCER: ICD-10-CM

## 2021-11-08 DIAGNOSIS — E66.9 CLASS 1 OBESITY WITHOUT SERIOUS COMORBIDITY IN ADULT, UNSPECIFIED BMI, UNSPECIFIED OBESITY TYPE: ICD-10-CM

## 2021-11-08 DIAGNOSIS — Z00.00 MEDICARE ANNUAL WELLNESS VISIT, SUBSEQUENT: ICD-10-CM

## 2021-11-08 DIAGNOSIS — J30.1 ALLERGIC RHINITIS DUE TO POLLEN, UNSPECIFIED SEASONALITY: ICD-10-CM

## 2021-11-08 DIAGNOSIS — Z85.72 HISTORY OF LYMPHOMA: ICD-10-CM

## 2021-11-08 DIAGNOSIS — M81.0 POSTMENOPAUSAL BONE LOSS: ICD-10-CM

## 2021-11-08 DIAGNOSIS — Z23 NEED FOR VACCINATION FOR PNEUMOCOCCUS: ICD-10-CM

## 2021-11-08 DIAGNOSIS — Z00.00 PREVENTATIVE HEALTH CARE: Chronic | ICD-10-CM

## 2021-11-08 DIAGNOSIS — E03.8 OTHER SPECIFIED HYPOTHYROIDISM: ICD-10-CM

## 2021-11-08 PROCEDURE — G0009 ADMIN PNEUMOCOCCAL VACCINE: HCPCS | Performed by: INTERNAL MEDICINE

## 2021-11-08 PROCEDURE — G0402 INITIAL PREVENTIVE EXAM: HCPCS | Performed by: INTERNAL MEDICINE

## 2021-11-08 PROCEDURE — 90670 PCV13 VACCINE IM: CPT | Performed by: INTERNAL MEDICINE

## 2021-11-08 ASSESSMENT — ACTIVITIES OF DAILY LIVING (ADL): BATHING_REQUIRES_ASSISTANCE: 0

## 2021-11-08 ASSESSMENT — FIBROSIS 4 INDEX: FIB4 SCORE: 0.89

## 2021-11-08 ASSESSMENT — PATIENT HEALTH QUESTIONNAIRE - PHQ9: CLINICAL INTERPRETATION OF PHQ2 SCORE: 0

## 2021-11-08 ASSESSMENT — ENCOUNTER SYMPTOMS: GENERAL WELL-BEING: GOOD

## 2021-11-08 NOTE — PROGRESS NOTES
HPI:  Mari is a 66 y.o. here for Medicare Annual Wellness Visit  Medications, allergies, medical history, surgical history, social history, family history  reviewed and updated  Sees  oncology  Sees [facility]  Sees allergy  stopped allergy shots last month  Sleep group annually on cpap using nightly 6 hours per night  Sees  dermatolgy uses sunscreen   Sees  eye macular degeneration   Current supplements: Reviewed  Chronic narcotic pain medicines: No  Allergies: Morphine and Other food  SH , walks daily [phone] to [phone] steps per day, tries to follow a healthy diet   Screening: Reviewed  Depressive Symptoms: Denies feeling down, depressed or hopeless. Denies loss of interest or pleasure in doing things   ADLs: Denies needing help with using telephone, transportation, shopping, preparing meals, housework, laundry, or managing medication or money.    Independent with bathing, hygiene, feeding, toileting, dressing    Memory concerns: Denies difficulty remembering details of conversations, events and upcoming appointments.  Hearing problems: Denies.   Recent falls no    Current social contact/activities: Reviewed    ROS:    Ostomy or other tubes or amputations no  Chronic oxygen use no  Gait: normal. Uses assistive device :  no       Health Care Screening recommendations reviewed with patient today and updated or ordered.  DPA/Advanced directive: Completed/Information provided.   Referrals for PT/OT/Nutrition counseling/Behavioral Health/Smoking cessation as above if indicated  Discussion today about general wellness and lifestyle habits:    Prevent falls and reduce trip hazards;   Have a working fire alarm and carbon monoxide detector;   Engage in regular physical activity and social activities;   Use sun protection when outdoors.       Patient Active Problem List    Diagnosis Date Noted   • Hypertension 07/09/2020   • Allergic rhinitis 03/09/2020   • History of lymphoma  02/11/2016   • Microscopic hematuria 10/16/2015   • Skin lesion 12/19/2014   • Family history of breast cancer in sister 12/19/2014   • Macular degeneration 12/19/2014   • Obesity 12/19/2014   • Sleep apnea 09/24/2010   • Preventative health care 09/17/2010   • S/p bunion surgery left foot 09/17/2010   • Deviated nasal septum 09/17/2010   • Dyslipidemia 09/17/2010   • Hypothyroid 09/16/2010              s/p bunion surgery left foot  Sees podiatry   12/11 podiatry note  1/13 bunion surgery      Sleep apnea  2/11 [facility] sleep study moderate obstructive sleep apnea syndrome with apnea-hypotony index 28, low saturation 83%, successful CPAP titration final pressure 9 cm with improvement in apnea-hypotony index to 1.5 with low saturation 91%  3/11 [facility] note on cpap 9  3/12 [facility] note on cpap 9  12/12 off cpap  12/14 back on cpap 9  12/20/15 on cpap 9  10/10/17sleep note continue cpap 9  11/20/17 echo normal LV size and function, EF 70%, mild tricuspid regurgitation, RVSP 35  10/19/18 sleep note on cpap 9  10/21/19 sleep note on cpap 9   10/21/20 sleep note on cpap 9     Skin lesion followed by dermatology Dr. Mondragon     [facility]  12/9/11 tdap   9/2/14 zoster vaccine at [facility]  12/22/16 pneumovax  12/29/16 dexa LS -0.7,hip +0.8  2/23/17 pap per  gyn   8/4/17 completed hep b vaccine series   3/13/19 mammogram done [facility] negative stable benign 6 mm left 2:00 nodule  11/26/19 shingrix second at [facility]  1/30/20 FIT negative  8/7/20 covid igg done at [facility]  3/22/21 colonoscopy per [facility] 3 mm benign-appearing sessile polyp pathology pending, pathology from the terminal ileum and 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, 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, will follow up with    5/5/21 covid moderna second  10/18/21 flu   11/5/21 vit d 45     Microscopic hematuria  12/19/14 UA 2-5 RBC repeat in 1 month  1/21/15 UA trace blood   1/27/15 ultrasound renal  negative  2/15 drop of urine one more time, send out UA evaluate microscopic hematuria , if still positive for urology  2/27/15 UA positive blood refer to urology  4/4/15 CT abdomen and pelvis with and without; retroperitoneal periaortic pericaval and retrocrural adenopathy, mesenteric adenopathy, no hydronephrosis or urolithiasis  10/16/15 urology note microscopic hematuria workup, CT urogram negative, cystoscopy bladder neck inflammation with cytology negative, hematuria has resolved; followup 6 months  4/13/16 [place]rology [place] UA and cytology  12/28/18 UA   8/10/20 UA negative done at [facility]     Macular degeneration  Sees   12/20/18 referral to   2/13/19  eye exam macular degeneration, continue antioxidants and areds formula  7/7/20  eye exam     Lymphoma  4/4/15 CT abdomen and pelvis with and without; retroperitoneal periaortic pericaval and retrocrural adenopathy, mesenteric adenopathy, no hydronephrosis or urolithiasis  12/11/15 ,,alk phos 170,bili 1.2   12/20/15 ultrasound liver pending, repeat liver enzymes and secondary workup pending, CT abdomen and pelvis ordered at urgent care pending follow-up lymphadenopathy  12/24/15 ultrasound abdomen and biliary tree, prominent liver size, no focal mass, gallbladder contains numerous mobile stones, no thickening or pericholecystic fluid is noted, maximum diameter common bile duct 9.8 which is enlarged, 2.6 x 2.2 x 2.6 cm nodule hypoechoic area in the pancreatic body not well visualized because of gas  12/24/15 AST 34,ALT 76,GGT 76.alk phos 88,bili 0.5,acute hep panel negative, iron 45,%sat 13,ferritin 67,AMA 22 (20-25 equivocal),F-actin Ab IgG negative,RE neg,SPEP neg  12/26/15 CT with contrast pending follow-up adenopathy on previous CT and possible pancreatic lesion on ultrasound, referral to surgery for cholelithiasis.  1/12/16  surgery scheduled for laparoscopic cholecystectomy, laparoscopic biopsy of  lymph nodes, conversion to open procedure to complete biopsy if necessary to rule out lymphoma  2/3/16 laparoscopic mesenteric lymph node biopsy limited sampling, partial involvement follicular lymphoma B-cell origin favor low-grade 1-2/3, flow cytometry CD10 monoclonal B cells, demonstrating lambda light chain expression and call expression of CD10 with CD19, CD20, negative for CD5 and CD43  2/19/16 CT/PET hypermetabolic activity within retrocrural, periaortic, aortocaval space, proximal pelvic adenopathy within the left common iliac chain, hypermetabolic adenopathy within mesentery  3/16/16  oncology consultation; 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 consistent with low-grade non-hodgkin's lymphoma follicular-type CD10 and CD20 positive, likely low-grade apparently indolent course, most likely will need observation, recommend complete workup including bone marrow biopsy, LDH, CMP,and once complete will calculate FLPI score  4/6/16  oncology note wbc 4.6,hgb 13.9,hct 43,plt 240; stage IIIa follicular lymphoma low-grade, recommend observation follow-up every 3 months first year and then every 6 months, CBC, CMP, LDH, beta-2 microglobulin and imaging studies as indicated, follow up 3 months  7/6/16  oncology note, wbc 7.7,hgb 13.6,hct 40,plt 304, ,beta 2 microglobulin 2.5 (1.1-2.4),uric 5.7;follow up 3 months  10/5/16  oncology note clinically no signs of recurrence, repeat CT chest, abdomen, pelvis in january, CMP, LDH today, follow-up 3 months  12/27/16 wbc 7.7 (49%N,30%L)  12/29/16 CT abdomen and pelvis with; interval resolution of retrocrural, para-aortic, paracaval, and mesenteric adenopathy, no adenopathy appreciated in pelvis, stable 2 cm or less diameter nodules right middle lobe  1/17/17  oncology note, doing well with recent CT showing no evidence of adenopathy, repeat labs prior to next visit CBC, CMP, LDH, beta-2  microglobulin, follow-up 6 months  7/12/17 cbc,cmp normal,,beta 2 microglobulin  7/18/17  oncology note no evidence disease progression  12/29/17 wbc 4.3 (61%N,18%L)  1/16/18  oncology note no evidence of disease progression, recent labs cbc,cmp,ldh normal, follow-up 6 months  7/30/18  oncology note stable no recurrence of disease, labs ordered  1/28/19  oncology note repeat labs follow up 6 months  8/7/19 wbc 8.0,hgb 13,hct 43,,cmp negative  8/14/19  oncology note no clinical symptoms, laboratory work reviewed, follow-up 1 year  8/14/20  oncology note no signs of disease recurrence or progression, continue observation  3/22/21 colonoscopy per [facility] 3 mm benign-appearing sessile polyp pathology pending, pathology from the terminal ileum and 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, 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, will follow up with    5/12/21  oncology note reviewed recent pathology colonoscopy showing terminal ileum biopsies positive for follicular lymphoma, currently asymptomatic, CT scan and labs ordered, follow-up depending on results  6/10/21 CT chest, abdomen, pelvis with; no evidence of lymphoma recurrence, mildly enlarged mesenteric lymph nodes decreased compared to CT/PET 2016     Hypothyroid  9/10 tsh 1.9 on levothyroxine 112 mcg  12/9/11 tsh 4.1 on levothyroxine 112 mcg; increase to levothyroxine 125 mcg  2/12 tsh 2.4 cont levothyroxine 125 mcg  1/13 tsh 3.5 on levothyroxine 125 mcg  2/28/14 tsh 3.9 on levothyroxine 125 mcg  12/19/14 tsh 3.8 on levothyroxine 125 mcg  12/24/15 tsh 2.0 on levothyroxine 125 mcg  12/22/16 tsh 3.3 on levothyroxine 125 mcg   12/29/17 tsh 2.6 on levothyroxine 125 mcg  12/28/18 tsh 6.5 on levothyroxine 125 mcg, change to levothyroxine 137 mcg, repeat tsh 6 weeks  2/14/19 tsh 2.8 on levothyroxine 137 mcg   12/31/19 tsh 9.3 on levothyroxine 137 mcg taking daily, change  to 150 mcg daily repeat tsh in 6 weeks ordered  2/4/20 tsh 2.4 on levothyroxine 150 mcg  8/7/20 tsh 2.9 on levothyroxine 150 mcg done at [facility]  3/12/21 tsh 3.4 on levothyroxine 150 mcg done at [facility]  11/5/21 tsh 7.7     hypertension  7/9/20 start lisinopril 10 mg  8/7/20 on lisinopril 10 mg urine mac<30 done at [facility]  4/12/21 off lisinopril due to weight loss     Dyslipidemia  9/10 chol 222,trig 144,hdl 48,ldl 144  12/9/11 chol 230,trig 106,hdl 55,ldl 154  1/13 chol 224,trig 109,hdl 46,ldl 156; 10 yr risk 4%  2/28/14 chol 226,trig 127,hdl 64,ldl 137  12/19/14 chol 205,trig 111,hdl 51,ldl 132  4/23/15 chol 212,trig 106,hdl 58,ldl 133  12/24/15 chol 212,trig 56,hdl 62,ldl 139  12/27/16 chol 231,trig 72,hdl 65,ldl 152  12/29/17 chol 208,trig 70,hdl 63,ldl 131  12/28/18 chol 219,trig 199,hdl 57,ldl 122  12/31/19 chol 221,trig 178,hdl 57,ldl 128; 10 year risk 5.3%  8/7/20 chol 233,trig 143,hdl 56,ldl 150 done at [facility], declines statin  3/12/21 chol 254,trig 121,hdl 63,ldl 166 done at [facility]; 10 year risk 5.4%, declined statin, will work on diet, exercise, repeat labs 6 months printed to mail to her  11/5/21 chol 213,trig 135,hdl 59,ldl 127      Deviated nasal septum     Allergic rhinitis  5/14/14  allergy note tolerating immunotherapy  8/1/15  allergy note on immunotherapy  9/6/16  allergy note allergic rhinitis tolerating immunotherapy  3/19/18  allergy note off immunotherapy x1 year, symptoms restarted this past winter, patient would like to restart immunotherapy  10/30/18  allergy note stable on immunotherapy  10/22/19  allergy note continue immunotherapy  12/23/19 monthly shots per   12/24/20 monthly shots per                    Current Outpatient Medications   Medication Sig Dispense Refill   • levothyroxine (SYNTHROID) 150 MCG Tab TAKE 1 TAB BY MOUTH EVERY MORNING ON AN EMPTY STOMACH. 90 Tablet 1   • Multiple Vitamins-Minerals (PRESERVISION  AREDS 2 PO) Take  by mouth.     • B Complex Vitamins (B COMPLEX PO) Take  by mouth.     • vitamin D (CHOLECALCIFEROL) 1000 UNIT Tab Take 2,000 Units by mouth every day.       No current facility-administered medications for this visit.        Patient is taking medications as noted in medication list.  Current supplements as per medication list.     Allergies: Penicillins    Current social contact/activities:  Get together with family and friends, active in [employer], phone calls with friends and family, volunteer work at [facility].    Is patient current with immunizations? Yes.    She  reports that she is a non-smoker but has been exposed to tobacco smoke. She has never used smokeless tobacco. She reports that she does not drink alcohol and does not use drugs.  Counseling given: Not Answered  Comment: parents smoke when she was a child        DPA/Advanced directive: Patient has Advanced Directive on file.     ROS:    Gait: Uses no assistive device   Ostomy: No   Other tubes: No   Amputations: No   Chronic oxygen use No ,CPAP  Last eye exam  2021  Wears hearing aids: No   : Denies any urinary leakage during the last 6 months      Screening:    yes    Depression Screening    Little interest or pleasure in doing things?  0 - not at all  Feeling down, depressed, or hopeless? 0 - not at all  Patient Health Questionnaire Score: 0    If depressive symptoms identified deferred to follow up visit unless specifically addressed in assessment and plan.    Interpretation of PHQ-9 Total Score   Score Severity   1-4 No Depression   5-9 Mild Depression   10-14 Moderate Depression   15-19 Moderately Severe Depression   20-27 Severe Depression    Screening for Cognitive Impairment    Three Minute Recall (captain, garden, picture)  2/3    Nakul clock face with all 12 numbers and set the hands to show 5 past 8.  Yes    If cognitive concerns identified, deferred for follow up unless specifically addressed in assessment and plan.    Fall Risk  Assessment    Has the patient had two or more falls in the last year or any fall with injury in the last year?  No  If fall risk identified, deferred for follow up unless specifically addressed in assessment and plan.    Safety Assessment    Throw rugs on floor.  No  Handrails on all stairs.  Yes  Good lighting in all hallways.  Yes  Difficulty hearing.  No  Patient counseled about all safety risks that were identified.    Functional Assessment ADLs    Are there any barriers preventing you from cooking for yourself or meeting nutritional needs?  No.    Are there any barriers preventing you from driving safely or obtaining transportation?  No.    Are there any barriers preventing you from using a telephone or calling for help?  No.    Are there any barriers preventing you from shopping?  No.    Are there any barriers preventing you from taking care of your own finances?  No.    Are there any barriers preventing you from managing your medications?  No.    Are there any barriers preventing you from showering, bathing or dressing yourself?  No.    Are you currently engaging in any exercise or physical activity?  Yes.     What is your perception of your health?  Good.      Patient Care Team:  Kenneth Reinoso M.D. as PCP - General Fried as Respiratory Therapist (DME Supplier)    Social History     Tobacco Use   • Smoking status: Passive Smoke Exposure - Never Smoker   • Smokeless tobacco: Never Used   • Tobacco comment: parents smoke when she was a child   Vaping Use   • Vaping Use: Never used   Substance Use Topics   • Alcohol use: No     Alcohol/week: 0.0 oz   • Drug use: No     Family History   Problem Relation Age of Onset   • Heart Disease Father    • Arthritis Mother         osteoporosis   • Sleep Apnea Neg Hx      She  has a past medical history of Chickenpox, Dental disorder, French measles, Hypothyroidism, Influenza, Mumps, ALLEN (obstructive sleep apnea), Sleep apnea, Snoring, Thyroid disease, and Tonsillitis.    Past Surgical History:   Procedure Laterality Date   • KALE BY LAPAROSCOPY  2/3/2016    Procedure: KALE BY LAPAROSCOPY;  Surgeon: Tricia Farah M.D.;  Location: SURGERY SAME DAY [facility];  Service:    • LYMPH NODE SAMPLING  2/3/2016    Procedure: LYMPH NODE SAMPLING EXCISION, BIOPSY;  Surgeon: Tricia Farah M.D.;  Location: SURGERY SAME DAY [facility];  Service:    • BONE GRAFT      at 19 years of age   • BUNIONECTOMY     • HYSTEROSCOPY WITH VIDEO DIAGNOSTIC     • NASAL SEPTAL RECONSTRUCTION     • OTHER      bone graft to ;hand   • TONSILLECTOMY       Depression Screening    Little interest or pleasure in doing things?  0 - not at all  Feeling down, depressed , or hopeless? 0 - not at all  Patient Health Questionnaire Score: 0     If depressive symptoms identified deferred to follow up visit unless specifically addressed in assessment and plan.    Interpretation of PHQ-9 Total Score   Score Severity   1-4 No Depression   5-9 Mild Depression   10-14 Moderate Depression   15-19 Moderately Severe Depression   20-27 Severe Depression    Screening for Cognitive Impairment    Three Minute Recall (captain, garden, picture) 2/3    Nakul clock face with all 12 numbers and set the hands to show 5 past 8.  Yes    Cognitive concerns identified deferred for follow up unless specifically addressed in assessment and plan.    Fall Risk Assessment    Has the patient had two or more falls in the last year or any fall with injury in the last year?  No    Safety Assessment    Throw rugs on floor.  No  Handrails on all stairs.  Yes  Good lighting in all hallways.  Yes  Difficulty hearing.  No  Patient counseled about all safety risks that were identified.    Functional Assessment ADLs    Are there any barriers preventing you from cooking for yourself or meeting nutritional needs?  No.    Are there any barriers preventing you from driving safely or obtaining transportation?  No.    Are there any barriers preventing you  from using a telephone or calling for help?  No.    Are there any barriers preventing you from shopping?  No.    Are there any barriers preventing you from taking care of your own finances?  No.    Are there any barriers preventing you from managing your medications?  No.    Are there any barriers preventing you from showering, bathing or dressing yourself?  No.    Are you currently engaging in any exercise or physical activity?  Yes.     What is your perception of your health?  Good.      Health Maintenance Summary          Overdue - MAMMOGRAM (Yearly) Overdue since 3/13/2020    03/13/2019  [id number]    11/25/2016  [id number]    11/21/2012  [id number]          IMM DTaP/Tdap/Td Vaccine (2 - Td or Tdap) Next due on 12/9/2021 12/09/2011  Imm Admin: Tdap Vaccine          IMM PNEUMOCOCCAL VACCINE: 65+ Years (1 of 1 - PPSV23) Order placed this encounter    12/22/2016  Imm Admin: Pneumococcal polysaccharide vaccine (PPSV-23)          BONE DENSITY (Every 5 Years) Tentatively due on 12/29/2021 12/29/2016  DS-BONE DENSITY STUDY (DEXA)          COLORECTAL CANCER SCREENING (COLONOSCOPY - Every 10 Years) Tentatively due on 3/22/2031    03/22/2021  REFERRAL TO GI FOR COLONOSCOPY    01/28/2020  OCCULT BLOOD FECES IMMUNOASSAY    01/14/2019  OCCULT BLOOD FECES IMMUNOASSAY    11/17/2010  AMB REFERRAL TO GI FOR COLONOSCOPY          HEPATITIS C SCREENING  Completed    12/23/2015  HEPATITIS PANEL ACUTE(4 COMPONENTS)          IMM HEP B VACCINE (Series Information) Aged Out    08/04/2017  Imm Admin: Hepatitis B Vaccine (Adol/Adult)    02/03/2017  Imm Admin: Hepatitis B Vaccine (Adol/Adult)    12/22/2016  Imm Admin: Hepatitis B Vaccine (Adol/Adult)          IMM ZOSTER VACCINES (Series Information) Completed    11/26/2019  Imm Admin: Zoster Vaccine Recombinant (RZV) (SHINGRIX)    09/10/2019  Imm Admin: Zoster Vaccine Recombinant (RZV) (SHINGRIX)    09/02/2014  Imm Admin: Zoster Vaccine Live (ZVL)  "(Zostavax) - HISTORICAL DATA          IMM INFLUENZA (Series Information) Completed    10/18/2021  Imm Admin: Influenza Vaccine Quad Inj (Pf)    09/27/2020  Imm Admin: Influenza Vaccine Quad Inj (Pf)    09/24/2020  Imm Admin: Influenza Vaccine Quad Inj (Pf)    09/03/2019  Imm Admin: Influenza Vaccine Quad Inj (Pf)    10/11/2018  Imm Admin: Influenza Vaccine Quad Inj (Pf)    Only the first 5 history entries have been loaded, but more history exists.          COVID-19 Vaccine (Series Information) Completed    10/26/2021  Imm Admin: Moderna SARS-CoV-2 Vaccine    05/05/2021  Imm Admin: Moderna SARS-CoV-2 Vaccine    03/28/2021  Imm Admin: Moderna SARS-CoV-2 Vaccine          IMM MENINGOCOCCAL VACCINE (MCV4) (Series Information) Aged Out    No completion history exists for this topic.          Discontinued - PAP SMEAR  Discontinued    02/23/2017  THINPREP PAP WITH HPV    02/23/2017  PATHOLOGY GYN SPECIMEN    02/23/2017  Done -     02/23/2017  Done    02/22/2016  ThinPrep Pap, w/HPV rflx to genotype    Only the first 5 history entries have been loaded, but more history exists.                Patient Care Team:  Kenneth Reinoso M.D. as PCP - General Fried as Respiratory Therapist (DME Supplier)        Exam:     /78 (BP Location: Right arm, Patient Position: Sitting)   Pulse 65   Temp 36.6 °C (97.9 °F) (Temporal)   Ht 1.6 m (5' 3\")   Wt 85.4 kg (188 lb 3.2 oz)   SpO2 97%  Body mass index is 33.34 kg/m².    Hearing good.    Dentition None  Alert, oriented in no acute distress.  Eye contact is good, speech goal directed, affect calm      Assessment and Plan. The following treatment and monitoring plan is recommended:    Assessment  #1 Medicare wellness    #2 dyslipidemia 11/5/21 chol 213,trig 135,hdl 59,ldl 127    #3 history of lymphoma followed by oncology 6/10/21 CT    #4 history of hypertension off lisinopril    #5 hypothyroid 11/5/21 tsh 7.7 taking Synthroid 150 mcg daily but typically will drink tea 10 " minutes after taking thyroid medication    #6 Allergic rhinitis off allergy shots since her dog passed away recently    #7 BMI 33.4    #8 Prev health  12/9/11 tdap   9/2/14 [facility]  12/22/16 pneumovax  12/29/16 dexa LS -0.7,hip +0.8  2/23/17 pap per  gyn   8/4/17 completed hep b vaccine series   3/13/19 mammogram done [facility] negative stable benign 6 mm left 2:00 nodule  11/26/19 shingrix second at [facility]  3/22/21 colonoscopy per [facility] 3 mm benign-appearing sessile polyp pathology pending  4/9/21 [facility] telephone note, pathology from the terminal ileum and 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 Health maintenance reviewed and updated    #2 start weight training and regular cardiovascular exercise and stretching, she is able to join the fitness center now that her insurance will cover that, continue work on a good nutrition program, limiting sweets, candies, processed foods    #3 mammogram and dexa ordered    #4 repeat tsh in 6 weeks, take thyroid medication with water only, nothing otherwise such as medications, food, other drinks for 30 minutes, lab slip provided    #5 no need for physical therapy or hearing test    #6 follow-up oncology    #7 pneumococcal 13 vaccination today, repeat pneumococcal 23 next year    #8 up-to-date on her flu shot and COVID vaccine series, she has gotten the booster    #9 follow-up 1 year    Services suggested: reviewed  Health Care Screening recommendations as per orders if indicated.  Referrals offered: PT/OT/Nutrition counseling/Behavioral Health/Smoking cessation as per orders if indicated.    Discussion today about general wellness and lifestyle habits:    · Prevent falls and reduce trip hazards; Cautioned about securing or removing rugs.  · Have a working fire  alarm and carbon monoxide detector;   · Engage in regular physical activity and social activities

## 2021-11-30 ENCOUNTER — HOSPITAL ENCOUNTER (OUTPATIENT)
Dept: RADIOLOGY | Facility: MEDICAL CENTER | Age: 66
End: 2021-11-30
Payer: MEDICARE

## 2022-01-04 ENCOUNTER — HOSPITAL ENCOUNTER (OUTPATIENT)
Dept: LAB | Facility: MEDICAL CENTER | Age: 67
End: 2022-01-04
Attending: INTERNAL MEDICINE
Payer: MEDICARE

## 2022-01-04 ENCOUNTER — TELEPHONE (OUTPATIENT)
Dept: MEDICAL GROUP | Facility: MEDICAL CENTER | Age: 67
End: 2022-01-04

## 2022-01-04 DIAGNOSIS — E03.8 OTHER SPECIFIED HYPOTHYROIDISM: Chronic | ICD-10-CM

## 2022-01-04 DIAGNOSIS — E78.5 DYSLIPIDEMIA: ICD-10-CM

## 2022-01-04 DIAGNOSIS — E78.5 DYSLIPIDEMIA: Chronic | ICD-10-CM

## 2022-01-04 LAB
ALBUMIN SERPL BCP-MCNC: 3.9 G/DL (ref 3.2–4.9)
ALBUMIN/GLOB SERPL: 1.5 G/DL
ALP SERPL-CCNC: 76 U/L (ref 30–99)
ALT SERPL-CCNC: 12 U/L (ref 2–50)
ANION GAP SERPL CALC-SCNC: 8 MMOL/L (ref 7–16)
AST SERPL-CCNC: 15 U/L (ref 12–45)
BILIRUB SERPL-MCNC: 0.2 MG/DL (ref 0.1–1.5)
BUN SERPL-MCNC: 20 MG/DL (ref 8–22)
CALCIUM SERPL-MCNC: 8.6 MG/DL (ref 8.4–10.2)
CHLORIDE SERPL-SCNC: 106 MMOL/L (ref 96–112)
CHOLEST SERPL-MCNC: 223 MG/DL (ref 100–199)
CO2 SERPL-SCNC: 26 MMOL/L (ref 20–33)
CREAT SERPL-MCNC: 0.84 MG/DL (ref 0.5–1.4)
FASTING STATUS PATIENT QL REPORTED: NORMAL
GLOBULIN SER CALC-MCNC: 2.6 G/DL (ref 1.9–3.5)
GLUCOSE SERPL-MCNC: 97 MG/DL (ref 65–99)
HDLC SERPL-MCNC: 56 MG/DL
LDLC SERPL CALC-MCNC: 147 MG/DL
POTASSIUM SERPL-SCNC: 4.3 MMOL/L (ref 3.6–5.5)
PROT SERPL-MCNC: 6.5 G/DL (ref 6–8.2)
SODIUM SERPL-SCNC: 140 MMOL/L (ref 135–145)
TRIGL SERPL-MCNC: 101 MG/DL (ref 0–149)
TSH SERPL DL<=0.005 MIU/L-ACNC: 11.02 UIU/ML (ref 0.38–5.33)

## 2022-01-04 PROCEDURE — 84443 ASSAY THYROID STIM HORMONE: CPT

## 2022-01-04 PROCEDURE — 36415 COLL VENOUS BLD VENIPUNCTURE: CPT

## 2022-01-04 PROCEDURE — 80053 COMPREHEN METABOLIC PANEL: CPT

## 2022-01-04 PROCEDURE — 80061 LIPID PANEL: CPT

## 2022-01-04 RX ORDER — LEVOTHYROXINE SODIUM 0.2 MG/1
200 TABLET ORAL
Qty: 90 TABLET | Refills: 0 | Status: SHIPPED | OUTPATIENT
Start: 2022-01-04 | End: 2022-03-21 | Stop reason: SDUPTHER

## 2022-01-05 NOTE — TELEPHONE ENCOUNTER
Notified with labs, TSH increased, she is taking her thyroid medication at least one hour before meals and her tea.  We will need to increase her dose, will change from 150 mcg daily to 200 mcg daily, prescription sent to her pharmacy, repeat TSH and thyroid function test 2 months (8 weeks), also her cholesterol is slightly higher this may be related to the hypothyroid state, possibly related to the holidays as well.  She will work on a good nutrition program, will repeat a lipid panel along with a thyroid blood test in 8 weeks.  Orders placed in the computer system.

## 2022-01-13 ENCOUNTER — APPOINTMENT (RX ONLY)
Dept: URBAN - METROPOLITAN AREA CLINIC 35 | Facility: CLINIC | Age: 67
Setting detail: DERMATOLOGY
End: 2022-01-13

## 2022-01-13 DIAGNOSIS — Z87.2 PERSONAL HISTORY OF DISEASES OF THE SKIN AND SUBCUTANEOUS TISSUE: ICD-10-CM

## 2022-01-13 DIAGNOSIS — L82.1 OTHER SEBORRHEIC KERATOSIS: ICD-10-CM

## 2022-01-13 DIAGNOSIS — L81.4 OTHER MELANIN HYPERPIGMENTATION: ICD-10-CM

## 2022-01-13 DIAGNOSIS — Z71.89 OTHER SPECIFIED COUNSELING: ICD-10-CM

## 2022-01-13 DIAGNOSIS — D22 MELANOCYTIC NEVI: ICD-10-CM

## 2022-01-13 PROBLEM — D22.5 MELANOCYTIC NEVI OF TRUNK: Status: ACTIVE | Noted: 2022-01-13

## 2022-01-13 PROCEDURE — ? COUNSELING

## 2022-01-13 PROCEDURE — 99213 OFFICE O/P EST LOW 20 MIN: CPT

## 2022-01-13 ASSESSMENT — LOCATION DETAILED DESCRIPTION DERM
LOCATION DETAILED: RIGHT DISTAL PRETIBIAL REGION
LOCATION DETAILED: LEFT SUPERIOR MEDIAL MIDBACK
LOCATION DETAILED: LEFT POSTERIOR SHOULDER
LOCATION DETAILED: RIGHT SUPERIOR UPPER BACK
LOCATION DETAILED: LEFT MEDIAL UPPER BACK

## 2022-01-13 ASSESSMENT — LOCATION ZONE DERM
LOCATION ZONE: ARM
LOCATION ZONE: LEG
LOCATION ZONE: TRUNK

## 2022-01-13 ASSESSMENT — LOCATION SIMPLE DESCRIPTION DERM
LOCATION SIMPLE: LEFT SHOULDER
LOCATION SIMPLE: RIGHT PRETIBIAL REGION
LOCATION SIMPLE: LEFT UPPER BACK
LOCATION SIMPLE: RIGHT UPPER BACK
LOCATION SIMPLE: LEFT LOWER BACK

## 2022-02-01 ENCOUNTER — TELEPHONE (OUTPATIENT)
Dept: MEDICAL GROUP | Facility: MEDICAL CENTER | Age: 67
End: 2022-02-01

## 2022-02-01 ENCOUNTER — HOSPITAL ENCOUNTER (OUTPATIENT)
Dept: RADIOLOGY | Facility: MEDICAL CENTER | Age: 67
End: 2022-02-01
Attending: INTERNAL MEDICINE
Payer: MEDICARE

## 2022-02-01 DIAGNOSIS — Z12.31 ENCOUNTER FOR SCREENING MAMMOGRAM FOR BREAST CANCER: ICD-10-CM

## 2022-02-01 DIAGNOSIS — M81.0 POSTMENOPAUSAL BONE LOSS: ICD-10-CM

## 2022-02-01 PROCEDURE — 77063 BREAST TOMOSYNTHESIS BI: CPT

## 2022-02-01 PROCEDURE — 77080 DXA BONE DENSITY AXIAL: CPT

## 2022-02-02 NOTE — TELEPHONE ENCOUNTER
Notified with her bone density and mammogram, negative mammogram repeat 1 year, bone density normal repeat 2 years

## 2022-02-08 ENCOUNTER — TELEPHONE (OUTPATIENT)
Dept: CARDIOLOGY | Facility: MEDICAL CENTER | Age: 67
End: 2022-02-08
Payer: MEDICARE

## 2022-02-08 DIAGNOSIS — G47.33 OSA (OBSTRUCTIVE SLEEP APNEA): ICD-10-CM

## 2022-02-08 NOTE — TELEPHONE ENCOUNTER
Blu with [facility] has called several times and left messages asking for a call back about our patient and issues with her equipment.  Nobody has returned her call. Please reach out to her.    Ext: [phone]    Thank You  Felicia PATTERSON

## 2022-02-09 NOTE — TELEPHONE ENCOUNTER
Pt now has SCP and orders need to go to DME:  [facility] /  [phone] / fax [phone]     Please sign new order.    All docs need to be sent.

## 2022-02-10 ENCOUNTER — TELEPHONE (OUTPATIENT)
Dept: OTHER | Facility: MEDICAL CENTER | Age: 67
End: 2022-02-10
Payer: MEDICARE

## 2022-02-10 NOTE — PROGRESS NOTES
VM message left for Dr. Benites's office requesting a return call re: CPAP supplies.  Contact information left.

## 2022-02-11 ENCOUNTER — TELEPHONE (OUTPATIENT)
Dept: MEDICAL GROUP | Facility: MEDICAL CENTER | Age: 67
End: 2022-02-11

## 2022-02-11 NOTE — TELEPHONE ENCOUNTER
Mari Kinsey  [phone] (home)     Pt called and needs you to send an Rx for CPAP Supples because she cannot get anyone from Dr Eid's office to respond to her.

## 2022-02-11 NOTE — TELEPHONE ENCOUNTER
This really needs to be done by her sleep specialist group. Please call the [facility] and let them know that the patient needs her supplies for her CPAP machine as they have been trying to contact the sleep group.

## 2022-02-14 ENCOUNTER — TELEPHONE (OUTPATIENT)
Dept: MEDICAL GROUP | Facility: MEDICAL CENTER | Age: 67
End: 2022-02-14
Payer: MEDICARE

## 2022-02-14 NOTE — TELEPHONE ENCOUNTER
Please call the [facility], the patient has been trying to get in touch with them for 2 weeks about her CPAP supplies that she needs reordered.

## 2022-02-16 ENCOUNTER — PATIENT MESSAGE (OUTPATIENT)
Dept: HEALTH INFORMATION MANAGEMENT | Facility: OTHER | Age: 67
End: 2022-02-16
Payer: MEDICARE

## 2022-02-18 NOTE — TELEPHONE ENCOUNTER
[facility] LEFT ME ON HOLD FOR 17 MIN AND NEVER DID ASK ME WHO I WANTED TO SPEAK TO. Will try again on Mon.

## 2022-02-25 NOTE — TELEPHONE ENCOUNTER
Spoke with Customer Service today. They put me on hold while they tried to reach out to [facility]. Held for 20 minutes, had to hang up.  Tried to reach pt also, to see if she rec'd anything.

## 2022-02-25 NOTE — TELEPHONE ENCOUNTER
, NERISSA Cota from [facility] has taken over this issue and patient has been informed that her supplies should arrive tomorrow.

## 2022-03-14 ENCOUNTER — NON-PROVIDER VISIT (OUTPATIENT)
Dept: MEDICAL GROUP | Facility: MEDICAL CENTER | Age: 67
End: 2022-03-14
Payer: MEDICARE

## 2022-03-14 DIAGNOSIS — Z23 NEED FOR DIPHTHERIA-TETANUS-PERTUSSIS (TDAP) VACCINE: ICD-10-CM

## 2022-03-14 DIAGNOSIS — Z00.00 PREVENTATIVE HEALTH CARE: Chronic | ICD-10-CM

## 2022-03-14 PROCEDURE — 90715 TDAP VACCINE 7 YRS/> IM: CPT | Performed by: INTERNAL MEDICINE

## 2022-03-14 PROCEDURE — 90471 IMMUNIZATION ADMIN: CPT | Performed by: INTERNAL MEDICINE

## 2022-03-14 NOTE — PROGRESS NOTES
"Mari Kinsey is a 66 y.o. female here for a non-provider visit for:   TDAP    Reason for immunization: Overdue/Provider Recommended  Immunization records indicate need for vaccine: Yes, confirmed with Epic  Minimum interval has been met for this vaccine: Yes  ABN completed: Not Indicated    VIS Dated  08/06/2021 was given to patient: Yes  All IAC Questionnaire questions were answered \"No.\"    Patient tolerated injection and no adverse effects were observed or reported: Yes    Pt scheduled for next dose in series: Not Indicated  "

## 2022-03-21 ENCOUNTER — HOSPITAL ENCOUNTER (OUTPATIENT)
Dept: LAB | Facility: MEDICAL CENTER | Age: 67
End: 2022-03-21
Attending: INTERNAL MEDICINE
Payer: MEDICARE

## 2022-03-21 ENCOUNTER — TELEPHONE (OUTPATIENT)
Dept: MEDICAL GROUP | Facility: MEDICAL CENTER | Age: 67
End: 2022-03-21

## 2022-03-21 DIAGNOSIS — E78.5 DYSLIPIDEMIA: ICD-10-CM

## 2022-03-21 DIAGNOSIS — E03.8 OTHER SPECIFIED HYPOTHYROIDISM: Chronic | ICD-10-CM

## 2022-03-21 LAB
CHOLEST SERPL-MCNC: 241 MG/DL (ref 100–199)
HDLC SERPL-MCNC: 61 MG/DL
LDLC SERPL CALC-MCNC: 148 MG/DL
T4 FREE SERPL-MCNC: 1.59 NG/DL (ref 0.93–1.7)
TRIGL SERPL-MCNC: 162 MG/DL (ref 0–149)
TSH SERPL DL<=0.005 MIU/L-ACNC: 0.41 UIU/ML (ref 0.38–5.33)

## 2022-03-21 PROCEDURE — 84443 ASSAY THYROID STIM HORMONE: CPT

## 2022-03-21 PROCEDURE — 80061 LIPID PANEL: CPT

## 2022-03-21 PROCEDURE — 36415 COLL VENOUS BLD VENIPUNCTURE: CPT

## 2022-03-21 PROCEDURE — 84439 ASSAY OF FREE THYROXINE: CPT

## 2022-03-21 RX ORDER — LEVOTHYROXINE SODIUM 0.2 MG/1
200 TABLET ORAL
Qty: 90 TABLET | Refills: 1 | Status: SHIPPED | OUTPATIENT
Start: 2022-03-21 | End: 2022-09-30

## 2022-03-22 ENCOUNTER — TELEPHONE (OUTPATIENT)
Dept: MEDICAL GROUP | Facility: MEDICAL CENTER | Age: 67
End: 2022-03-22
Payer: MEDICARE

## 2022-03-22 DIAGNOSIS — Z91.89 OTHER SPECIFIED PERSONAL RISK FACTORS, NOT ELSEWHERE CLASSIFIED: ICD-10-CM

## 2022-03-22 DIAGNOSIS — E78.5 DYSLIPIDEMIA: ICD-10-CM

## 2022-03-22 DIAGNOSIS — I10 HYPERTENSION, UNSPECIFIED TYPE: ICD-10-CM

## 2022-03-22 NOTE — TELEPHONE ENCOUNTER
Pt states that her Thyroid dose currently is 150 mcg, Please advise.    Pt states she  Will give up meat and do the test, hoping to avoid taking medication for her cholesterol.

## 2022-03-22 NOTE — TELEPHONE ENCOUNTER
----- Message from Kenneth Reinoso M.D. sent at 3/21/2022 11:25 PM PDT -----  Please notify the patient that her thyroid level is within range, she can continue on the Synthroid her dose of 200 mcg daily, I will send a refill to her pharmacy.  Her cholesterol is still high, I was hoping the improved thyroid levels with be beneficial with regards to her cholesterol but her total is 241, good cholesterol is 61 (goal is above 40), her bad cholesterol is still high at 148 (goal is less than 100) she still might benefit from a cholesterol medication.  Another option is we could obtain a test called a CT calcium score and this would check the amount of calcium in the blood vessels of the heart, and if that score were elevated that might persuade her to consider taking a cholesterol medication.  If she does not want the cholesterol pill right now but if she would be willing to do the CT calcium test, please let me know.         4

## 2022-03-22 NOTE — TELEPHONE ENCOUNTER
Please notify the patient that her thyroid level is within range, she can continue on the Synthroid her dose of 200 mcg daily, I will send a refill to her pharmacy.  Her cholesterol is still high, I was hoping the improved thyroid levels with be beneficial with regards to her cholesterol but her total is 241, good cholesterol is 61 (goal is above 40), her bad cholesterol is still high at 148 (goal is less than 100) she still might benefit from a cholesterol medication.  Another option is we could obtain a test called a CT calcium score and this would check the amount of calcium in the blood vessels of the heart, and if that score were elevated that might persuade her to consider taking a cholesterol medication.  If she does not want the cholesterol pill right now but if she would be willing to do the CT calcium test, please let me know.

## 2022-03-23 NOTE — TELEPHONE ENCOUNTER
Thank you for the notification, she was supposed to be on 200 mcg daily based upon her test results in January, from the telephone note when I spoke to her January 4 I did advise that we would need to change her dosing to 200 mcg daily and I sent a prescription to [facility] in January 4 of this year for the 200 mcg dose.  I assumed she was taking that dose.    Please have her double check the dose of the medication that she is currently on.  If she is still taking the 150, she can stay on that dose and I will need to send another prescription to her pharmacy for the 150 mcg dose so that no one gets confused since she has 2 different dosages at the pharmacy.

## 2022-03-28 NOTE — TELEPHONE ENCOUNTER
Pt verified that yes, she is taking 200 mcg of thyroid medication  daily.     She states that she is having fish 3/4 times a week, chicken 2 - 3 times a wk and beef once a month.

## 2022-03-28 NOTE — TELEPHONE ENCOUNTER
Please have her stay on the thyroid 200 mcg dose daily.    If she would like to try the cholesterol medication as discussed in the previous messages please let me know.

## 2022-03-29 NOTE — TELEPHONE ENCOUNTER
Yes, I did not know if she wanted that test or not but if she would like that test I will place that order.

## 2022-04-01 ENCOUNTER — TELEPHONE (OUTPATIENT)
Dept: MEDICAL GROUP | Facility: MEDICAL CENTER | Age: 67
End: 2022-04-01
Payer: MEDICARE

## 2022-04-01 NOTE — TELEPHONE ENCOUNTER
Mari Kinsey  [phone] (home)     Pt wants to know if you would recommend she get another Covid Booster?

## 2022-04-22 ENCOUNTER — TELEPHONE (OUTPATIENT)
Dept: MEDICAL GROUP | Facility: MEDICAL CENTER | Age: 67
End: 2022-04-22
Payer: MEDICARE

## 2022-04-22 ENCOUNTER — HOSPITAL ENCOUNTER (OUTPATIENT)
Dept: RADIOLOGY | Facility: MEDICAL CENTER | Age: 67
End: 2022-04-22
Attending: INTERNAL MEDICINE
Payer: COMMERCIAL

## 2022-04-22 DIAGNOSIS — Z91.89 OTHER SPECIFIED PERSONAL RISK FACTORS, NOT ELSEWHERE CLASSIFIED: ICD-10-CM

## 2022-04-22 DIAGNOSIS — E78.5 DYSLIPIDEMIA: ICD-10-CM

## 2022-04-22 DIAGNOSIS — I10 HYPERTENSION, UNSPECIFIED TYPE: ICD-10-CM

## 2022-04-22 PROBLEM — Z86.79 HISTORY OF HYPERTENSION: Status: ACTIVE | Noted: 2020-07-09

## 2022-04-22 PROCEDURE — 4410556 CT-CARDIAC SCORING (SELF PAY ONLY)

## 2022-04-22 NOTE — TELEPHONE ENCOUNTER
Called patient and left a message, please let her know that the CT calcium test showed 0 calcium which is excellent and the best score possible.  Given that her score is 0, she does not need to be aggressive in lowering her cholesterol with cholesterol medication especially if her blood pressure is controlled without medication.  We can continue to monitor cholesterol annually and if her cholesterol continues to increase we may consider cholesterol medication at some point.  The CT calcium heart test also showed evidence of a small lung nodule on the left lung but that has been present on her prior CTs ordered by the oncologist and this has been stable.

## 2022-04-22 NOTE — TELEPHONE ENCOUNTER
----- Message from Kenneth Reinoso M.D. sent at 4/22/2022  3:18 PM PDT -----  Called patient and left a message, please let her know that the CT calcium test showed 0 calcium which is excellent and the best score possible.  Given that her score is 0, she does not need to be aggressive in lowering her cholesterol with cholesterol medication especially if her blood pressure is controlled without medication.  We can continue to monitor cholesterol annually and if her cholesterol continues to increase we may consider cholesterol medication at some point.  The CT calcium heart test also showed evidence of a small lung nodule on the left lung but that has been present on her prior CTs ordered by the oncologist and this has been stable.

## 2022-05-09 ENCOUNTER — OFFICE VISIT (OUTPATIENT)
Dept: SLEEP MEDICINE | Facility: MEDICAL CENTER | Age: 67
End: 2022-05-09
Payer: MEDICARE

## 2022-05-09 VITALS
BODY MASS INDEX: 35.61 KG/M2 | SYSTOLIC BLOOD PRESSURE: 124 MMHG | WEIGHT: 201 LBS | HEIGHT: 63 IN | HEART RATE: 72 BPM | OXYGEN SATURATION: 95 % | DIASTOLIC BLOOD PRESSURE: 82 MMHG | RESPIRATION RATE: 16 BRPM

## 2022-05-09 DIAGNOSIS — G47.33 OSA (OBSTRUCTIVE SLEEP APNEA): ICD-10-CM

## 2022-05-09 PROCEDURE — 99214 OFFICE O/P EST MOD 30 MIN: CPT | Performed by: FAMILY MEDICINE

## 2022-05-09 ASSESSMENT — FIBROSIS 4 INDEX: FIB4 SCORE: 0.89

## 2022-05-09 ASSESSMENT — PATIENT HEALTH QUESTIONNAIRE - PHQ9: CLINICAL INTERPRETATION OF PHQ2 SCORE: 0

## 2022-05-09 NOTE — PROGRESS NOTES
[facility] Follow Up Note     Date: 5/9/2022 / Time: 8:36 AM    Patient ID:   Name:             Mair Kinsey   YOB: 1955  Age:                 66 y.o.  female   MRN:               [id number]      Thank you for requesting a sleep medicine consultation on Mari Kinsey at the sleep center. She presents today with the chief complaints of ALLEN follow up.     HISTORY OF PRESENT ILLNESS:       Pt is currently on CPAP 9 cm . She goes to sleep around 11:30 pm and wakes up around 7:30 am. She is getting about 8 hrs of sleep on a good night and about 6 hr of sleep on a bad night. She occasionally has prolonged awakening.  Overall, she does  finds her sleep refreshing.She does not take regular naps. She denies any symptoms of RLS, narcolepsy or any symptoms to suggest parasomnias such as nightmares, sleep walking or acting out of dreams.      She is using CPAP most days of the week. Pt reports 7+ hrs of average nightly use of CPAP. Pt denies snoring, gasping,choking.Pt also denies significant mask leak that is interfering with sleep. The 30 day compliance was downloaded which shows adequate compliance with more that 4 hr usage about 100%. The AHI is has improved to 1.0/hr. The mask leak is normal. The symptoms of ALLEN are well controlled on the therapy        REVIEW OF SYSTEMS:       Constitutional: Denies fevers, Denies weight changes  Eyes: Denies changes in vision, no eye pain  Ears/Nose/Throat/Mouth: Denies nasal congestion or sore throat   Cardiovascular: Denies chest pain or palpitations   Respiratory: Denies shortness of breath , Denies cough  Gastrointestinal/Hepatic: Denies abdominal pain, nausea, vomiting, diarrhea, constipation or GI bleeding   Genitourinary: Deniesdysuria or frequency  Musculoskeletal/Rheum: Denies  joint pain and swelling   Skin/Breast: Denies rash,   Neurological: Denies headache, confusion, memory loss or focal weakness/parasthesias  Psychiatric: denies mood  "disorder   Sleep: denies snoring     Comprehensive review of systems form is reviewed with the patient and is attached in the EMR.     PMH:  has a past medical history of Chickenpox, Dental disorder, Mongolian measles, Hypothyroidism, Influenza, Mumps, ALLEN (obstructive sleep apnea), Sleep apnea, Snoring, Thyroid disease, and Tonsillitis.  MEDS:   Current Outpatient Medications:   •  levothyroxine (SYNTHROID) 200 MCG Tab, Take 1 Tablet by mouth every morning on an empty stomach., Disp: 90 Tablet, Rfl: 1  •  Multiple Vitamins-Minerals (PRESERVISION AREDS 2 PO), Take  by mouth., Disp: , Rfl:   •  B Complex Vitamins (B COMPLEX PO), Take  by mouth., Disp: , Rfl:   •  vitamin D (CHOLECALCIFEROL) 1000 UNIT Tab, Take 2,000 Units by mouth every day., Disp: , Rfl:   ALLERGIES:   Allergies   Allergen Reactions   • Penicillins Rash     SURGHX:   Past Surgical History:   Procedure Laterality Date   • KALE BY LAPAROSCOPY  2/3/2016    Procedure: KALE BY LAPAROSCOPY;  Surgeon: Tricia Farah M.D.;  Location: SURGERY SAME DAY [facility];  Service:    • LYMPH NODE SAMPLING  2/3/2016    Procedure: LYMPH NODE SAMPLING EXCISION, BIOPSY;  Surgeon: Tricia Farah M.D.;  Location: SURGERY SAME DAY [facility];  Service:    • BONE GRAFT      at 19 years of age   • BUNIONECTOMY     • HYSTEROSCOPY WITH VIDEO DIAGNOSTIC     • NASAL SEPTAL RECONSTRUCTION     • OTHER      bone graft to ;hand   • TONSILLECTOMY       SOCHX:  reports that she is a non-smoker but has been exposed to tobacco smoke. She has never used smokeless tobacco. She reports that she does not drink alcohol and does not use drugs..  FH:   Family History   Problem Relation Age of Onset   • Heart Disease Father    • Arthritis Mother         osteoporosis   • Sleep Apnea Neg Hx          Physical Exam:  Vitals/ General Appearance:   Weight/BMI: Body mass index is 35.61 kg/m².  Resp 16   Ht 1.6 m (5' 3\")   Wt 91.2 kg (201 lb)   Vitals:    05/09/22 0825   Resp: 16   Weight: " "91.2 kg (201 lb)   Height: 1.6 m (5' 3\")       Pt. is alert and oriented to time, place and person. Cooperative and in no apparent distress.       Constitutional: Alert, no distress, well-groomed.  Skin: No rashes in visible areas.  Eye: Round. Conjunctiva clear, lids normal. No icterus.   ENMT: Lips pink without lesions, good dentition, moist mucous membranes. Phonation normal.  Neck: No masses, no thyromegaly. Moves freely without pain.  CV: Pulse as reported by patient  Respiratory: Unlabored respiratory effort, no cough or audible wheeze  Psych: Alert and oriented x3, normal affect and mood.     ASSESSMENT AND PLAN     1. Sleep Apnea .The pathophysiology of sleep anea and the increased risk of cardiovascular morbidity from untreated sleep apnea is discussed in detail with the patient.  She is urged to avoid supine sleep, weight gain and alcoholic beverages since all of these can worsen sleep apnea. She is cautioned against drowsy driving. If She feels sleepy while driving, She must pull over for a break/nap, rather than persist on the road, in the interest of She own safety and that of others on the road.   Plan   - Continue CPAP 9 cm with FFM     - Compliance download was reviewed and discussed with the pt   - Compliance was reinforced     2. Regarding treatment of other past medical problems and general health maintenance,  She is urged to follow up with PCP.    "

## 2022-05-27 ENCOUNTER — TELEPHONE (OUTPATIENT)
Dept: HEALTH INFORMATION MANAGEMENT | Facility: OTHER | Age: 67
End: 2022-05-27
Payer: MEDICARE

## 2022-05-27 PROBLEM — C82.90 FOLLICULAR LYMPHOMA (HCC): Status: ACTIVE | Noted: 2022-05-27

## 2022-06-17 ENCOUNTER — HOSPITAL ENCOUNTER (OUTPATIENT)
Facility: MEDICAL CENTER | Age: 67
End: 2022-06-17
Attending: PREVENTIVE MEDICINE
Payer: COMMERCIAL

## 2022-06-17 ENCOUNTER — EH NON-PROVIDER (OUTPATIENT)
Dept: OCCUPATIONAL MEDICINE | Facility: CLINIC | Age: 67
End: 2022-06-17
Payer: MEDICARE

## 2022-06-17 DIAGNOSIS — Z02.89 ENCOUNTER FOR OCCUPATIONAL HEALTH EXAMINATION: Primary | ICD-10-CM

## 2022-06-17 DIAGNOSIS — Z02.89 ENCOUNTER FOR OCCUPATIONAL HEALTH EXAMINATION: ICD-10-CM

## 2022-06-17 PROCEDURE — 86765 RUBEOLA ANTIBODY: CPT | Performed by: PREVENTIVE MEDICINE

## 2022-06-17 PROCEDURE — 86480 TB TEST CELL IMMUN MEASURE: CPT | Performed by: PREVENTIVE MEDICINE

## 2022-06-17 PROCEDURE — 86735 MUMPS ANTIBODY: CPT | Performed by: PREVENTIVE MEDICINE

## 2022-06-17 PROCEDURE — 86762 RUBELLA ANTIBODY: CPT | Performed by: PREVENTIVE MEDICINE

## 2022-06-19 LAB
GAMMA INTERFERON BACKGROUND BLD IA-ACNC: 0.03 IU/ML
M TB IFN-G BLD-IMP: NEGATIVE
M TB IFN-G CD4+ BCKGRND COR BLD-ACNC: 0 IU/ML
MITOGEN IGNF BCKGRD COR BLD-ACNC: >10 IU/ML
QFT TB2 - NIL TBQ2: 0 IU/ML

## 2022-06-20 LAB
MEV IGG SER IA-ACNC: >300 AU/ML
MUV IGG SER IA-ACNC: 217 AU/ML
RUBV AB SER QL: 297 IU/ML

## 2022-06-28 ENCOUNTER — HOSPITAL ENCOUNTER (OUTPATIENT)
Dept: RADIOLOGY | Facility: MEDICAL CENTER | Age: 67
End: 2022-06-28
Attending: INTERNAL MEDICINE
Payer: MEDICARE

## 2022-07-05 PROBLEM — H26.9 CATARACT: Status: ACTIVE | Noted: 2022-07-05

## 2022-07-15 ENCOUNTER — EH NON-PROVIDER (OUTPATIENT)
Dept: OCCUPATIONAL MEDICINE | Facility: CLINIC | Age: 67
End: 2022-07-15

## 2022-09-28 ENCOUNTER — PHARMACY VISIT (OUTPATIENT)
Dept: PHARMACY | Facility: MEDICAL CENTER | Age: 67
End: 2022-09-28
Payer: COMMERCIAL

## 2022-09-28 PROCEDURE — RXMED WILLOW AMBULATORY MEDICATION CHARGE: Performed by: INTERNAL MEDICINE

## 2022-11-10 ENCOUNTER — APPOINTMENT (OUTPATIENT)
Dept: SLEEP MEDICINE | Facility: MEDICAL CENTER | Age: 67
End: 2022-11-10
Payer: MEDICARE

## 2022-11-10 SDOH — ECONOMIC STABILITY: FOOD INSECURITY: WITHIN THE PAST 12 MONTHS, THE FOOD YOU BOUGHT JUST DIDN'T LAST AND YOU DIDN'T HAVE MONEY TO GET MORE.: NEVER TRUE

## 2022-11-10 SDOH — ECONOMIC STABILITY: FOOD INSECURITY: WITHIN THE PAST 12 MONTHS, YOU WORRIED THAT YOUR FOOD WOULD RUN OUT BEFORE YOU GOT MONEY TO BUY MORE.: NEVER TRUE

## 2022-11-10 SDOH — ECONOMIC STABILITY: TRANSPORTATION INSECURITY
IN THE PAST 12 MONTHS, HAS THE LACK OF TRANSPORTATION KEPT YOU FROM MEDICAL APPOINTMENTS OR FROM GETTING MEDICATIONS?: NO

## 2022-11-10 SDOH — HEALTH STABILITY: PHYSICAL HEALTH: ON AVERAGE, HOW MANY DAYS PER WEEK DO YOU ENGAGE IN MODERATE TO STRENUOUS EXERCISE (LIKE A BRISK WALK)?: 4 DAYS

## 2022-11-10 SDOH — HEALTH STABILITY: MENTAL HEALTH
STRESS IS WHEN SOMEONE FEELS TENSE, NERVOUS, ANXIOUS, OR CAN'T SLEEP AT NIGHT BECAUSE THEIR MIND IS TROUBLED. HOW STRESSED ARE YOU?: ONLY A LITTLE

## 2022-11-10 SDOH — ECONOMIC STABILITY: TRANSPORTATION INSECURITY
IN THE PAST 12 MONTHS, HAS LACK OF RELIABLE TRANSPORTATION KEPT YOU FROM MEDICAL APPOINTMENTS, MEETINGS, WORK OR FROM GETTING THINGS NEEDED FOR DAILY LIVING?: NO

## 2022-11-10 SDOH — ECONOMIC STABILITY: INCOME INSECURITY: IN THE LAST 12 MONTHS, WAS THERE A TIME WHEN YOU WERE NOT ABLE TO PAY THE MORTGAGE OR RENT ON TIME?: NO

## 2022-11-10 SDOH — ECONOMIC STABILITY: TRANSPORTATION INSECURITY
IN THE PAST 12 MONTHS, HAS LACK OF TRANSPORTATION KEPT YOU FROM MEETINGS, WORK, OR FROM GETTING THINGS NEEDED FOR DAILY LIVING?: NO

## 2022-11-10 SDOH — ECONOMIC STABILITY: HOUSING INSECURITY
IN THE LAST 12 MONTHS, WAS THERE A TIME WHEN YOU DID NOT HAVE A STEADY PLACE TO SLEEP OR SLEPT IN A SHELTER (INCLUDING NOW)?: NO

## 2022-11-10 SDOH — ECONOMIC STABILITY: HOUSING INSECURITY: IN THE LAST 12 MONTHS, HOW MANY PLACES HAVE YOU LIVED?: 1

## 2022-11-10 SDOH — HEALTH STABILITY: PHYSICAL HEALTH: ON AVERAGE, HOW MANY MINUTES DO YOU ENGAGE IN EXERCISE AT THIS LEVEL?: 30 MIN

## 2022-11-10 SDOH — ECONOMIC STABILITY: INCOME INSECURITY: HOW HARD IS IT FOR YOU TO PAY FOR THE VERY BASICS LIKE FOOD, HOUSING, MEDICAL CARE, AND HEATING?: NOT HARD AT ALL

## 2022-11-10 ASSESSMENT — SOCIAL DETERMINANTS OF HEALTH (SDOH)
IN A TYPICAL WEEK, HOW MANY TIMES DO YOU TALK ON THE PHONE WITH FAMILY, FRIENDS, OR NEIGHBORS?: MORE THAN THREE TIMES A WEEK
HOW HARD IS IT FOR YOU TO PAY FOR THE VERY BASICS LIKE FOOD, HOUSING, MEDICAL CARE, AND HEATING?: NOT HARD AT ALL
HOW OFTEN DO YOU ATTENT MEETINGS OF THE CLUB OR ORGANIZATION YOU BELONG TO?: MORE THAN 4 TIMES PER YEAR
HOW OFTEN DO YOU ATTEND CHURCH OR RELIGIOUS SERVICES?: MORE THAN 4 TIMES PER YEAR
HOW OFTEN DO YOU ATTEND CHURCH OR RELIGIOUS SERVICES?: MORE THAN 4 TIMES PER YEAR
DO YOU BELONG TO ANY CLUBS OR ORGANIZATIONS SUCH AS CHURCH GROUPS UNIONS, FRATERNAL OR ATHLETIC GROUPS, OR SCHOOL GROUPS?: YES
IN A TYPICAL WEEK, HOW MANY TIMES DO YOU TALK ON THE PHONE WITH FAMILY, FRIENDS, OR NEIGHBORS?: MORE THAN THREE TIMES A WEEK
HOW OFTEN DO YOU ATTENT MEETINGS OF THE CLUB OR ORGANIZATION YOU BELONG TO?: MORE THAN 4 TIMES PER YEAR
HOW OFTEN DO YOU HAVE SIX OR MORE DRINKS ON ONE OCCASION: NEVER
HOW OFTEN DO YOU GET TOGETHER WITH FRIENDS OR RELATIVES?: TWICE A WEEK
WITHIN THE PAST 12 MONTHS, YOU WORRIED THAT YOUR FOOD WOULD RUN OUT BEFORE YOU GOT THE MONEY TO BUY MORE: NEVER TRUE
HOW MANY DRINKS CONTAINING ALCOHOL DO YOU HAVE ON A TYPICAL DAY WHEN YOU ARE DRINKING: PATIENT DOES NOT DRINK
DO YOU BELONG TO ANY CLUBS OR ORGANIZATIONS SUCH AS CHURCH GROUPS UNIONS, FRATERNAL OR ATHLETIC GROUPS, OR SCHOOL GROUPS?: YES
HOW OFTEN DO YOU GET TOGETHER WITH FRIENDS OR RELATIVES?: TWICE A WEEK
HOW OFTEN DO YOU HAVE A DRINK CONTAINING ALCOHOL: NEVER

## 2022-11-10 ASSESSMENT — LIFESTYLE VARIABLES
AUDIT-C TOTAL SCORE: 0
HOW OFTEN DO YOU HAVE A DRINK CONTAINING ALCOHOL: NEVER
HOW MANY STANDARD DRINKS CONTAINING ALCOHOL DO YOU HAVE ON A TYPICAL DAY: PATIENT DOES NOT DRINK
HOW OFTEN DO YOU HAVE SIX OR MORE DRINKS ON ONE OCCASION: NEVER
SKIP TO QUESTIONS 9-10: 1

## 2022-11-14 ENCOUNTER — OFFICE VISIT (OUTPATIENT)
Dept: MEDICAL GROUP | Facility: MEDICAL CENTER | Age: 67
End: 2022-11-14
Payer: MEDICARE

## 2022-11-14 VITALS
OXYGEN SATURATION: 96 % | HEIGHT: 62 IN | HEART RATE: 82 BPM | DIASTOLIC BLOOD PRESSURE: 70 MMHG | WEIGHT: 198 LBS | SYSTOLIC BLOOD PRESSURE: 124 MMHG | TEMPERATURE: 97.5 F | BODY MASS INDEX: 36.44 KG/M2

## 2022-11-14 DIAGNOSIS — E78.5 DYSLIPIDEMIA: ICD-10-CM

## 2022-11-14 DIAGNOSIS — R73.09 IMPAIRED GLUCOSE METABOLISM: ICD-10-CM

## 2022-11-14 DIAGNOSIS — E66.9 CLASS 1 OBESITY WITHOUT SERIOUS COMORBIDITY IN ADULT, UNSPECIFIED BMI, UNSPECIFIED OBESITY TYPE: ICD-10-CM

## 2022-11-14 DIAGNOSIS — E55.9 VITAMIN D DEFICIENCY: ICD-10-CM

## 2022-11-14 DIAGNOSIS — Z00.00 PREVENTATIVE HEALTH CARE: Chronic | ICD-10-CM

## 2022-11-14 DIAGNOSIS — Z23 NEED FOR VACCINATION FOR STREP PNEUMONIAE: ICD-10-CM

## 2022-11-14 DIAGNOSIS — L98.9 SKIN LESION: ICD-10-CM

## 2022-11-14 DIAGNOSIS — E03.8 OTHER SPECIFIED HYPOTHYROIDISM: Chronic | ICD-10-CM

## 2022-11-14 DIAGNOSIS — Z00.00 MEDICARE ANNUAL WELLNESS VISIT, SUBSEQUENT: ICD-10-CM

## 2022-11-14 DIAGNOSIS — H91.90 HEARING LOSS, UNSPECIFIED HEARING LOSS TYPE, UNSPECIFIED LATERALITY: ICD-10-CM

## 2022-11-14 DIAGNOSIS — J30.1 ALLERGIC RHINITIS DUE TO POLLEN, UNSPECIFIED SEASONALITY: ICD-10-CM

## 2022-11-14 PROCEDURE — G0439 PPPS, SUBSEQ VISIT: HCPCS | Mod: 25 | Performed by: INTERNAL MEDICINE

## 2022-11-14 PROCEDURE — G0009 ADMIN PNEUMOCOCCAL VACCINE: HCPCS | Performed by: INTERNAL MEDICINE

## 2022-11-14 PROCEDURE — 90677 PCV20 VACCINE IM: CPT | Performed by: INTERNAL MEDICINE

## 2022-11-14 ASSESSMENT — FIBROSIS 4 INDEX: FIB4 SCORE: 0.9

## 2022-11-14 ASSESSMENT — ENCOUNTER SYMPTOMS: GENERAL WELL-BEING: GOOD

## 2022-11-14 ASSESSMENT — ACTIVITIES OF DAILY LIVING (ADL): BATHING_REQUIRES_ASSISTANCE: 0

## 2022-11-14 ASSESSMENT — PATIENT HEALTH QUESTIONNAIRE - PHQ9: CLINICAL INTERPRETATION OF PHQ2 SCORE: 0

## 2022-11-14 NOTE — NON-PROVIDER
Annual Health Assessment Questions:    1.  Are you currently engaging in any exercise or physical activity? No    2.  How would you describe your mood or emotional well-being today? good    3.  Have you had any falls in the last year? No    4.  Have you noticed any problems with your balance or had difficulty walking? No    5.  In the last six months have you experienced any leakage of urine? No    6. DPA/Advanced Directive: Patient has Advance Directive on file.

## 2022-11-14 NOTE — PROGRESS NOTES
Subjective     Mari Kinsey is a 67 y.o. female who presents with medicare wellness        HPI          Medicare wellness  Current supplements: Reviewed  Chronic narcotic pain medicines: No  Allergies:  reviewed   Sees  cataract surgery, uses reading glasses, no glasses for driving  Sees  oncology  Sees [facility]   Screening: Reviewed  Tries to eat healthy, no red meat regularly but eats chicken and fish, does try to avoid processed foods   Taking diabetes prevention online [employer] for one day per week for the year   Walking 4-5 days per week 30 to 45 minutes and in the summer was doing swimming  Depressive Symptoms: Denies feeling down, depressed or hopeless. Denies loss of interest or pleasure in doing things   ADLs: Denies needing help with using telephone, transportation, shopping, preparing meals, housework, laundry, or managing medication or money.    Independent with bathing, hygiene, feeding, toileting, dressing    Memory concerns: Denies difficulty remembering details of conversations, events and upcoming appointments.  Hearing problems: Denies.   Teeth cleaning twice per year, does brush and floss  Recent falls no  No issues with driving  Does all ADLs   Current social contact/activities: Reviewed    ROS:    Ostomy or other tubes or amputations no  Chronic oxygen use no     Gait: normal. Uses assistive device :  no    Current Outpatient Medications   Medication Sig Dispense Refill    levothyroxine (SYNTHROID) 200 MCG Tab TAKE 1 TABLET BY MOUTH EVERY DAY IN THE MORNING ON AN EMPTY STOMACH 90 Tablet 0    Multiple Vitamins-Minerals (PRESERVISION AREDS 2 PO) Take  by mouth.      B Complex Vitamins (B COMPLEX PO) Take  by mouth.      vitamin D (CHOLECALCIFEROL) 1000 UNIT Tab Take 2,000 Units by mouth every day.       No current facility-administered medications for this visit.          s/p bunion surgery left foot  Sees podiatry   12/11 podiatry note  1/13 bunion surgery       Sleep apnea  2/11 [facility] sleep study moderate obstructive sleep apnea syndrome with apnea-hypotony index 28, low saturation 83%, successful CPAP titration final pressure 9 cm with improvement in apnea-hypotony index to 1.5 with low saturation 91%  3/11 [facility] note on cpap 9  3/12 [facility] note on cpap 9  12/12 off cpap  12/14 back on cpap 9  12/20/15 on cpap 9  10/10/17sleep note continue cpap 9  11/20/17 echo normal LV size and function, EF 70%, mild tricuspid regurgitation, RVSP 35  10/19/18 sleep note on cpap 9  10/21/19 sleep note on cpap 9   10/21/20 sleep note on cpap 9  5/4/21 sleep note on cpap 9  5/9/22 sleep note continue cpap 9     Skin lesion followed by dermatology Dr. Mondragon     [facility]  12/9/11 tdap   12/22/16 pneumovax  8/4/17 completed hep b vaccine series   11/26/19 shingrix second at 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, will follow up with    11/5/21 vit d 45  11/8/21 prevnar  2/1/22 mammogram  2/1/22 dexa LS-0.6,hip+0.7  3/14/22 tdap  9/28/22 covid bivalent     Microscopic hematuria  12/19/14 UA 2-5 RBC repeat in 1 month  1/21/15 UA trace blood   1/27/15 ultrasound renal negative  2/15 drop of urine one more time, send out UA evaluate microscopic hematuria , if still positive for urology  2/27/15 UA positive blood refer to urology  4/4/15 CT abdomen and pelvis with and without; retroperitoneal periaortic pericaval and retrocrural adenopathy, mesenteric adenopathy, no hydronephrosis or urolithiasis  10/16/15 urology note microscopic hematuria workup, CT urogram negative, cystoscopy bladder neck inflammation with cytology negative, hematuria has resolved; followup 6 months  4/13/16 urology [place] note UA and cytology  12/28/18 UA   8/10/20 UA negative done at  [facility]     Macular drusen  12/20/18 referral to   2/13/19  eye exam macular degeneration, continue antioxidants and areds formula  7/7/20  eye exam  5/23/22  retina specialist macular drusen, posterior vitreous detachment, senile cataract  9/6/22 [place] [facility] macular drusen    Hypothyroid  9/10 tsh 1.9 on levothyroxine 112 mcg  12/9/11 tsh 4.1 on levothyroxine 112 mcg; increase to levothyroxine 125 mcg  2/12 tsh 2.4 cont levothyroxine 125 mcg  1/13 tsh 3.5 on levothyroxine 125 mcg  2/28/14 tsh 3.9 on levothyroxine 125 mcg  12/19/14 tsh 3.8 on levothyroxine 125 mcg  12/24/15 tsh 2.0 on levothyroxine 125 mcg  12/22/16 tsh 3.3 on levothyroxine 125 mcg   12/29/17 tsh 2.6 on levothyroxine 125 mcg  12/28/18 tsh 6.5 on levothyroxine 125 mcg, change to levothyroxine 137 mcg, repeat tsh 6 weeks  2/14/19 tsh 2.8 on levothyroxine 137 mcg   12/31/19 tsh 9.3 on levothyroxine 137 mcg taking daily, change to 150 mcg daily repeat tsh in 6 weeks ordered  2/4/20 tsh 2.4 on levothyroxine 150 mcg  8/7/20 tsh 2.9 on levothyroxine 150 mcg done [facility]  3/12/21 tsh 3.4 on levothyroxine 150 mcg done at [facility]  11/5/21 tsh 7.7 ?thyroid dose, patient has been drinking tea 10 minutes after thyroid medication, will stop tea for 30 minutes, repeat tsh 6 weeks   1/4/22 tsh 11.0 on levothyroxine 150 mcg change to 200 mcg   3/21/22 tsh 0.4,free t4 1.59 on 200 mcg daily       history of Lymphoma  4/4/15 CT abdomen and pelvis with and without; retroperitoneal periaortic pericaval and retrocrural adenopathy, mesenteric adenopathy, no hydronephrosis or urolithiasis  12/11/15 ,,alk phos 170,bili 1.2   12/20/15 ultrasound liver pending, repeat liver enzymes and secondary workup pending, CT abdomen and pelvis ordered at urgent care pending follow-up lymphadenopathy  12/24/15 ultrasound abdomen and biliary tree, prominent liver size, no focal mass, gallbladder contains numerous mobile stones, no  thickening or pericholecystic fluid is noted, maximum diameter common bile duct 9.8 which is enlarged, 2.6 x 2.2 x 2.6 cm nodule hypoechoic area in the pancreatic body not well visualized because of gas  12/24/15 AST 34,ALT 76,GGT 76.alk phos 88,bili 0.5,acute hep panel negative, iron 45,%sat 13,ferritin 67,AMA 22 (20-25 equivocal),F-actin Ab IgG negative,RE neg,SPEP neg  12/26/15 CT with contrast pending follow-up adenopathy on previous CT and possible pancreatic lesion on ultrasound, referral to surgery for cholelithiasis.  1/12/16  surgery scheduled for laparoscopic cholecystectomy, laparoscopic biopsy of lymph nodes, conversion to open procedure to complete biopsy if necessary to rule out lymphoma  2/3/16 laparoscopic mesenteric lymph node biopsy limited sampling, partial involvement follicular lymphoma B-cell origin favor low-grade 1-2/3, flow cytometry CD10 monoclonal B cells, demonstrating lambda light chain expression and call expression of CD10 with CD19, CD20, negative for CD5 and CD43  2/19/16 CT/PET hypermetabolic activity within retrocrural, periaortic, aortocaval space, proximal pelvic adenopathy within the left common iliac chain, hypermetabolic adenopathy within mesentery  3/16/16  oncology consultation; 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 consistent with low-grade non-hodgkin's lymphoma follicular-type CD10 and CD20 positive, likely low-grade apparently indolent course, most likely will need observation, recommend complete workup including bone marrow biopsy, LDH, CMP,and once complete will calculate FLPI score  4/6/16  oncology note wbc 4.6,hgb 13.9,hct 43,plt 240; stage IIIa follicular lymphoma low-grade, recommend observation follow-up every 3 months first year and then every 6 months, CBC, CMP, LDH, beta-2 microglobulin and imaging studies as indicated, follow up 3 months  7/6/16  oncology note, wbc 7.7,hgb 13.6,hct 40,plt 304, LDH  143,beta 2 microglobulin 2.5 (1.1-2.4),uric 5.7;follow up 3 months  10/5/16  oncology note clinically no signs of recurrence, repeat CT chest, abdomen, pelvis in january, CMP, LDH today, follow-up 3 months  12/27/16 wbc 7.7 (49%N,30%L)  12/29/16 CT abdomen and pelvis with; interval resolution of retrocrural, para-aortic, paracaval, and mesenteric adenopathy, no adenopathy appreciated in pelvis, stable 2 cm or less diameter nodules right middle lobe  1/17/17  oncology note, doing well with recent CT showing no evidence of adenopathy, repeat labs prior to next visit CBC, CMP, LDH, beta-2 microglobulin, follow-up 6 months  7/12/17 cbc,cmp normal,,beta 2 microglobulin  7/18/17  oncology note no evidence disease progression  12/29/17 wbc 4.3 (61%N,18%L)  1/16/18  oncology note no evidence of disease progression, recent labs cbc,cmp,ldh normal, follow-up 6 months  7/30/18  oncology note stable no recurrence of disease, labs ordered  1/28/19 dr.reganti chairez note repeat labs follow up 6 months  8/7/19 wbc 8.0,hgb 13,hct 43,,cmp negative  8/14/19  oncology note no clinical symptoms, laboratory work reviewed, follow-up 1 year  8/14/20  oncology note no signs of disease recurrence or progression, continue observation  3/22/21 colonoscopy per [facility] 3 mm benign-appearing sessile polyp pathology pending, pathology from the terminal ileum and 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, 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, will follow up with    5/12/21  oncology note reviewed recent pathology colonoscopy showing terminal ileum biopsies positive for follicular lymphoma, currently asymptomatic, CT scan and labs ordered, follow-up depending on results  6/4/21 CT chest, abdomen, pelvis with; stable mildly enlarged mesenteric lymph nodes compatible with low-grade lymphoma similar to CT on 12/29/16 but decreased  compared to CT/PET 2/19/16 measuring up to 1.3 cm left mesentery, similar appearance of few small pulmonary nodules up to 4 mm left lower lobe (previously 4 mm on CT chest on 7/4/07)  12/16/21  oncology note no signs of disease progression, continue to monitor  5/5/22  oncology note stable, recommend repeat CT chest, abdomen, pelvis, labs ordered  6/28/22 CT chest, abdomen, pelvis per oncology no new abnormalities, similar appearance few minimally enlarged intra-abdominal lymph nodes up to 1.1 cm (previously 1.3 cm)  7/6/22  oncology note no signs or symptoms of disease progression, continue routine surveillance follow-up      history of hypertension  7/9/20 start lisinopril 10 mg  8/7/20 on lisinopril 10 mg urine mac<30 done at [facility]  4/12/21 off lisinopril due to weight loss  4/22/22 CT calcium score total 0.0, incidental 4.5 nodule left lower lobe (6/4/21 CT chest, abdomen, pelvis with; similar appearance of few small pulmonary nodules up to 4 mm left lower lobe (previously 4 mm on CT chest on 7/4/07)     Dyslipidemia  9/10 chol 222,trig 144,hdl 48,ldl 144  12/9/11 chol 230,trig 106,hdl 55,ldl 154  1/13 chol 224,trig 109,hdl 46,ldl 156; 10 yr risk 4%  2/28/14 chol 226,trig 127,hdl 64,ldl 137  12/19/14 chol 205,trig 111,hdl 51,ldl 132  4/23/15 chol 212,trig 106,hdl 58,ldl 133  12/24/15 chol 212,trig 56,hdl 62,ldl 139  12/27/16 chol 231,trig 72,hdl 65,ldl 152  12/29/17 chol 208,trig 70,hdl 63,ldl 131  12/28/18 chol 219,trig 199,hdl 57,ldl 122  12/31/19 chol 221,trig 178,hdl 57,ldl 128; 10 year risk 5.3%  8/7/20 chol 233,trig 143,hdl 56,ldl 150 done at [facility], declines statin  3/12/21 chol 254,trig 121,hdl 63,ldl 166 done at [facility]; 10 year risk 5.4%, declined statin, will work on diet, exercise, repeat labs 6 months printed to mail to her  11/5/21 chol 213,trig 135,hdl 59,ldl 127  1/4/22 chol 223,trig 101,hdl 56,ldl 147, tsh 11 so will increase synthroid and repeat tsh 8 weeks  3/21/22  chol 241,trig 162,hdl 61,ldl 148  22 CT calcium score total 0.0       Deviated nasal septum     Allergic rhinitis  14  allergy note tolerating immunotherapy  8/1/15  allergy note on immunotherapy  16  allergy note allergic rhinitis tolerating immunotherapy  3/19/18  allergy note off immunotherapy x1 year, symptoms restarted this past winter, patient would like to restart immunotherapy  10/30/18  allergy note stable on immunotherapy  10/22/19  allergy note continue immunotherapy  19 monthly shots per   20 monthly shots per    21 stopped allergy shots last month once cat           Patient Active Problem List   Diagnosis    Hypothyroid    Preventative health care    S/p bunion surgery left foot    Deviated nasal septum    Dyslipidemia    Sleep apnea    Skin lesion    Family history of breast cancer in sister    Macular drusen    Obesity    Microscopic hematuria    Hodgkin lymphoma (HCC)    Allergic rhinitis    History of hypertension    Follicular lymphoma (HCC)    Cataract            Depression Screening  Little interest or pleasure in doing things?  0 - not at all  Feeling down, depressed , or hopeless? 0 - not at all  Patient Health Questionnaire Score: 0     If depressive symptoms identified deferred to follow up visit unless specifically addressed in assessment and plan.    Interpretation of PHQ-9 Total Score   Score Severity   1-4 No Depression   5-9 Mild Depression   10-14 Moderate Depression   15-19 Moderately Severe Depression   20-27 Severe Depression    Screening for Cognitive Impairment  Three Minute Recall (daughter, heaven, mountain) 3/3    Nakul clock face with all 12 numbers and set the hands to show 10 past 11.  Yes    Cognitive concerns identified deferred for follow up unless specifically addressed in assessment and plan.    Fall Risk Assessment  Has the patient had two or more falls in the last  year or any fall with injury in the last year?  No    Safety Assessment  Throw rugs on floor.  No  Handrails on all stairs.  Yes  Good lighting in all hallways.  Yes  Difficulty hearing.  No  Patient counseled about all safety risks that were identified.    Functional Assessment ADLs  Are there any barriers preventing you from cooking for yourself or meeting nutritional needs?  No.    Are there any barriers preventing you from driving safely or obtaining transportation?  No.    Are there any barriers preventing you from using a telephone or calling for help?  No.    Are there any barriers preventing you from shopping?  No.    Are there any barriers preventing you from taking care of your own finances?  No.    Are there any barriers preventing you from managing your medications?  No.    Are there any barriers preventing you from showering, bathing or dressing yourself?  No.    Are you currently engaging in any exercise or physical activity?  No.     What is your perception of your health?  Good    Advance Care Planning  Do you have an Advance Directive, Living Will, Durable Power of , or POLST? Yes  Advance Directive       is on file    Annual Health Assessment Questions:     1.  Are you currently engaging in any exercise or physical activity? No     2.  How would you describe your mood or emotional well-being today? good     3.  Have you had any falls in the last year? No     4.  Have you noticed any problems with your balance or had difficulty walking? No     5.  In the last six months have you experienced any leakage of urine? No     6. DPA/Advanced Directive: Patient has Advance Directive on file.         Patient Care Team:  Kenneth Reinoso M.D. as PCP - General  Kenneth Reinoso M.D. as PCP - [facility]  [facility] as Respiratory Therapist (DME Supplier)  Marguerite Barajas R.N. as       Health Care Screening recommendations reviewed with patient today and updated or ordered.  DPA/Advanced  "directive: Completed/Information provided.   Referrals for PT/OT/Nutrition counseling/Behavioral Health/Smoking cessation as above if indicated  Discussion today about general wellness and lifestyle habits:    Prevent falls and reduce trip hazards;   Have a working fire alarm and carbon monoxide detector;   Engage in regular physical activity and social activities;   Use sun protection when outdoors.       ROS           Objective     /88 (BP Location: Right arm, Patient Position: Sitting, BP Cuff Size: Adult)   Pulse 82   Temp 36.4 °C (97.5 °F)   Ht 1.575 m (5' 2\")   Wt 89.8 kg (198 lb)   SpO2 96%   BMI 36.21 kg/m²    Repeat blood pressure done by myself right arm 124/70  Physical Exam  Vitals and nursing note reviewed.   Constitutional:       Appearance: Normal appearance.   HENT:      Head: Normocephalic and atraumatic.      Right Ear: External ear normal.      Left Ear: External ear normal.   Eyes:      Conjunctiva/sclera: Conjunctivae normal.   Cardiovascular:      Rate and Rhythm: Normal rate and regular rhythm.   Pulmonary:      Effort: Pulmonary effort is normal.      Breath sounds: Normal breath sounds.   Abdominal:      General: There is no distension.   Musculoskeletal:         General: No swelling.   Skin:     Coloration: Skin is not jaundiced.   Neurological:      General: No focal deficit present.      Mental Status: She is alert.   Psychiatric:         Mood and Affect: Mood normal.         Behavior: Behavior normal.                           Assessment & Plan        Assessment  #1 Medicare wellness    #2 history of lymphoma followed by oncology no evidence of progression of disease, recent CT scan this year negative    #3 hypothyroid 3/21/22 tsh 0.4,free t4 1.59 on 200 mcg daily     #4 Sleep apnea on cpap    #5 Dyslipidemia 3/21/22 chol 241,trig 162,hdl 61,ldl 148, CT calcium score in April 0.0    #6 BMI 36.1 working on nutrition online nutrition class through [employer] exercises by walking   "   #7 skin lesions followed by dermatology uses sunscreen    #8 elevated blood pressure here 132/88 checks blood pressure at home and at health fairs has been normal, repeat by myself right arm 124/70     #9 Prev health  12/22/16 pneumovax  8/4/17 completed hep b vaccine series   11/26/19 [facility]  3/22/21 colonoscopy per [facility] 3 mm benign-appearing sessile polyp pathology pending  4/9/21 GI telephone note, pathology from the terminal ileum and 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 Health maintenance reviewed and updated    #2 audiology  group    #3 Prevnar 20     #4 currently doing online nutrition education through [employer], continue to work on nutrition, exercise, weight loss    #5 uses sunscreen and a hat  and follow up dermatology    #6 recommend trekking poles for walking on trails    #7 old records  dermatology     #8 follow-up sleep apnea group, continue CPAP    #9 follow-up oncology    #10 up-to-date on mammogram, bone density, colon cancer screening    #11 up-to-date on the flu vaccine and most recent COVID-vaccine    #12 labs    #13 follow-up 1 year

## 2022-12-16 ENCOUNTER — TELEPHONE (OUTPATIENT)
Dept: MEDICAL GROUP | Facility: MEDICAL CENTER | Age: 67
End: 2022-12-16
Payer: MEDICARE

## 2022-12-16 ENCOUNTER — HOSPITAL ENCOUNTER (OUTPATIENT)
Dept: LAB | Facility: MEDICAL CENTER | Age: 67
End: 2022-12-16
Attending: INTERNAL MEDICINE
Payer: MEDICARE

## 2022-12-16 DIAGNOSIS — E55.9 VITAMIN D DEFICIENCY: ICD-10-CM

## 2022-12-16 DIAGNOSIS — R73.09 IMPAIRED GLUCOSE METABOLISM: ICD-10-CM

## 2022-12-16 DIAGNOSIS — E78.5 DYSLIPIDEMIA: ICD-10-CM

## 2022-12-16 LAB
25(OH)D3 SERPL-MCNC: 40 NG/ML (ref 30–100)
ALBUMIN SERPL BCP-MCNC: 4.1 G/DL (ref 3.2–4.9)
ALBUMIN/GLOB SERPL: 1.6 G/DL
ALP SERPL-CCNC: 73 U/L (ref 30–99)
ALT SERPL-CCNC: 17 U/L (ref 2–50)
ANION GAP SERPL CALC-SCNC: 9 MMOL/L (ref 7–16)
AST SERPL-CCNC: 19 U/L (ref 12–45)
BASOPHILS # BLD AUTO: 0.6 % (ref 0–1.8)
BASOPHILS # BLD: 0.04 K/UL (ref 0–0.12)
BILIRUB SERPL-MCNC: 0.5 MG/DL (ref 0.1–1.5)
BUN SERPL-MCNC: 14 MG/DL (ref 8–22)
CALCIUM ALBUM COR SERPL-MCNC: 9.2 MG/DL (ref 8.5–10.5)
CALCIUM SERPL-MCNC: 9.3 MG/DL (ref 8.4–10.2)
CHLORIDE SERPL-SCNC: 105 MMOL/L (ref 96–112)
CHOLEST SERPL-MCNC: 212 MG/DL (ref 100–199)
CO2 SERPL-SCNC: 26 MMOL/L (ref 20–33)
CREAT SERPL-MCNC: 0.81 MG/DL (ref 0.5–1.4)
EOSINOPHIL # BLD AUTO: 0.67 K/UL (ref 0–0.51)
EOSINOPHIL NFR BLD: 9.9 % (ref 0–6.9)
ERYTHROCYTE [DISTWIDTH] IN BLOOD BY AUTOMATED COUNT: 43.6 FL (ref 35.9–50)
EST. AVERAGE GLUCOSE BLD GHB EST-MCNC: 131 MG/DL
FASTING STATUS PATIENT QL REPORTED: NORMAL
GFR SERPLBLD CREATININE-BSD FMLA CKD-EPI: 80 ML/MIN/1.73 M 2
GLOBULIN SER CALC-MCNC: 2.6 G/DL (ref 1.9–3.5)
GLUCOSE SERPL-MCNC: 87 MG/DL (ref 65–99)
HBA1C MFR BLD: 6.2 % (ref 4–5.6)
HCT VFR BLD AUTO: 41.9 % (ref 37–47)
HDLC SERPL-MCNC: 54 MG/DL
HGB BLD-MCNC: 13.5 G/DL (ref 12–16)
IMM GRANULOCYTES # BLD AUTO: 0.02 K/UL (ref 0–0.11)
IMM GRANULOCYTES NFR BLD AUTO: 0.3 % (ref 0–0.9)
LDLC SERPL CALC-MCNC: 129 MG/DL
LYMPHOCYTES # BLD AUTO: 1.68 K/UL (ref 1–4.8)
LYMPHOCYTES NFR BLD: 24.7 % (ref 22–41)
MCH RBC QN AUTO: 30.2 PG (ref 27–33)
MCHC RBC AUTO-ENTMCNC: 32.2 G/DL (ref 33.6–35)
MCV RBC AUTO: 93.7 FL (ref 81.4–97.8)
MONOCYTES # BLD AUTO: 0.63 K/UL (ref 0–0.85)
MONOCYTES NFR BLD AUTO: 9.3 % (ref 0–13.4)
NEUTROPHILS # BLD AUTO: 3.75 K/UL (ref 2–7.15)
NEUTROPHILS NFR BLD: 55.2 % (ref 44–72)
NRBC # BLD AUTO: 0 K/UL
NRBC BLD-RTO: 0 /100 WBC
PLATELET # BLD AUTO: 310 K/UL (ref 164–446)
PMV BLD AUTO: 9.3 FL (ref 9–12.9)
POTASSIUM SERPL-SCNC: 4.1 MMOL/L (ref 3.6–5.5)
PROT SERPL-MCNC: 6.7 G/DL (ref 6–8.2)
RBC # BLD AUTO: 4.47 M/UL (ref 4.2–5.4)
SODIUM SERPL-SCNC: 140 MMOL/L (ref 135–145)
TRIGL SERPL-MCNC: 143 MG/DL (ref 0–149)
TSH SERPL DL<=0.005 MIU/L-ACNC: 0.85 UIU/ML (ref 0.38–5.33)
WBC # BLD AUTO: 6.8 K/UL (ref 4.8–10.8)

## 2022-12-16 PROCEDURE — 84443 ASSAY THYROID STIM HORMONE: CPT

## 2022-12-16 PROCEDURE — 82306 VITAMIN D 25 HYDROXY: CPT

## 2022-12-16 PROCEDURE — 85025 COMPLETE CBC W/AUTO DIFF WBC: CPT

## 2022-12-16 PROCEDURE — 83036 HEMOGLOBIN GLYCOSYLATED A1C: CPT

## 2022-12-16 PROCEDURE — 36415 COLL VENOUS BLD VENIPUNCTURE: CPT

## 2022-12-16 PROCEDURE — 80053 COMPREHEN METABOLIC PANEL: CPT

## 2022-12-16 PROCEDURE — 80061 LIPID PANEL: CPT

## 2022-12-17 NOTE — TELEPHONE ENCOUNTER
Notified with labs, A1c increased as she has been babysitting more recently and not eating as well as she could, she will work on limiting sweets, candies, carbohydrate portion serving sizes and processed foods.  We will repeat A1c in 3 months, lab slip printed to be mailed to her.  Her cholesterol is improved.  Her thyroid is at goal.  Continue the Synthroid same dose no changes.

## 2022-12-19 ENCOUNTER — OFFICE VISIT (OUTPATIENT)
Dept: SLEEP MEDICINE | Facility: MEDICAL CENTER | Age: 67
End: 2022-12-19
Payer: MEDICARE

## 2022-12-19 VITALS
RESPIRATION RATE: 16 BRPM | HEIGHT: 62 IN | OXYGEN SATURATION: 94 % | DIASTOLIC BLOOD PRESSURE: 60 MMHG | SYSTOLIC BLOOD PRESSURE: 130 MMHG | WEIGHT: 199 LBS | HEART RATE: 77 BPM | BODY MASS INDEX: 36.62 KG/M2

## 2022-12-19 DIAGNOSIS — G47.33 OSA (OBSTRUCTIVE SLEEP APNEA): ICD-10-CM

## 2022-12-19 PROCEDURE — 99213 OFFICE O/P EST LOW 20 MIN: CPT | Performed by: NURSE PRACTITIONER

## 2022-12-19 ASSESSMENT — FIBROSIS 4 INDEX: FIB4 SCORE: 0.98
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
## 2023-02-22 ENCOUNTER — APPOINTMENT (RX ONLY)
Dept: URBAN - METROPOLITAN AREA CLINIC 35 | Facility: CLINIC | Age: 68
Setting detail: DERMATOLOGY
End: 2023-02-22

## 2023-02-22 DIAGNOSIS — Z71.89 OTHER SPECIFIED COUNSELING: ICD-10-CM

## 2023-02-22 DIAGNOSIS — L81.4 OTHER MELANIN HYPERPIGMENTATION: ICD-10-CM

## 2023-02-22 DIAGNOSIS — L82.1 OTHER SEBORRHEIC KERATOSIS: ICD-10-CM

## 2023-02-22 DIAGNOSIS — D22 MELANOCYTIC NEVI: ICD-10-CM

## 2023-02-22 DIAGNOSIS — Z87.2 PERSONAL HISTORY OF DISEASES OF THE SKIN AND SUBCUTANEOUS TISSUE: ICD-10-CM

## 2023-02-22 PROBLEM — D22.5 MELANOCYTIC NEVI OF TRUNK: Status: ACTIVE | Noted: 2023-02-22

## 2023-02-22 PROBLEM — D23.71 OTHER BENIGN NEOPLASM OF SKIN OF RIGHT LOWER LIMB, INCLUDING HIP: Status: ACTIVE | Noted: 2023-02-22

## 2023-02-22 PROBLEM — D23.72 OTHER BENIGN NEOPLASM OF SKIN OF LEFT LOWER LIMB, INCLUDING HIP: Status: ACTIVE | Noted: 2023-02-22

## 2023-02-22 PROCEDURE — ? COUNSELING

## 2023-02-22 PROCEDURE — 99213 OFFICE O/P EST LOW 20 MIN: CPT

## 2023-02-22 ASSESSMENT — LOCATION SIMPLE DESCRIPTION DERM
LOCATION SIMPLE: LEFT SHOULDER
LOCATION SIMPLE: LEFT UPPER BACK
LOCATION SIMPLE: LEFT LOWER BACK
LOCATION SIMPLE: RIGHT UPPER BACK
LOCATION SIMPLE: RIGHT PRETIBIAL REGION

## 2023-02-22 ASSESSMENT — LOCATION ZONE DERM
LOCATION ZONE: ARM
LOCATION ZONE: TRUNK
LOCATION ZONE: LEG

## 2023-02-22 ASSESSMENT — LOCATION DETAILED DESCRIPTION DERM
LOCATION DETAILED: RIGHT SUPERIOR UPPER BACK
LOCATION DETAILED: LEFT SUPERIOR MEDIAL MIDBACK
LOCATION DETAILED: LEFT POSTERIOR SHOULDER
LOCATION DETAILED: RIGHT DISTAL PRETIBIAL REGION
LOCATION DETAILED: LEFT MEDIAL UPPER BACK

## 2023-04-14 ENCOUNTER — TELEPHONE (OUTPATIENT)
Dept: MEDICAL GROUP | Facility: MEDICAL CENTER | Age: 68
End: 2023-04-14
Payer: MEDICARE

## 2023-04-14 ENCOUNTER — HOSPITAL ENCOUNTER (OUTPATIENT)
Dept: LAB | Facility: MEDICAL CENTER | Age: 68
End: 2023-04-14
Attending: INTERNAL MEDICINE
Payer: MEDICARE

## 2023-04-14 DIAGNOSIS — R73.09 IMPAIRED GLUCOSE METABOLISM: ICD-10-CM

## 2023-04-14 LAB
EST. AVERAGE GLUCOSE BLD GHB EST-MCNC: 117 MG/DL
HBA1C MFR BLD: 5.7 % (ref 4–5.6)

## 2023-04-14 PROCEDURE — 83036 HEMOGLOBIN GLYCOSYLATED A1C: CPT

## 2023-04-14 PROCEDURE — 36415 COLL VENOUS BLD VENIPUNCTURE: CPT

## 2023-04-14 NOTE — TELEPHONE ENCOUNTER
Called the patient left a message, please notify her that her diabetes test is much improved at 5.7% a decrease from 6.2%, normal is less than 5.7% so she is basically at a high normal blood sugar average.  She does not have diabetes.  Have her continue on a good nutrition and exercise program.

## 2023-04-17 ENCOUNTER — TELEPHONE (OUTPATIENT)
Dept: MEDICAL GROUP | Facility: MEDICAL CENTER | Age: 68
End: 2023-04-17
Payer: MEDICARE

## 2023-04-17 PROBLEM — F39 MOOD DISORDER (HCC): Status: ACTIVE | Noted: 2023-04-17

## 2023-04-17 PROBLEM — I27.20 PULMONARY HYPERTENSION (HCC): Status: ACTIVE | Noted: 2023-04-17

## 2023-04-17 PROBLEM — E55.9 VITAMIN D DEFICIENCY: Status: ACTIVE | Noted: 2023-04-17

## 2023-04-17 PROBLEM — H35.3131 EARLY DRY STAGE NONEXUDATIVE AGE-RELATED MACULAR DEGENERATION OF BOTH EYES: Status: ACTIVE | Noted: 2023-04-17

## 2023-04-17 PROBLEM — R73.03 PREDIABETES: Status: ACTIVE | Noted: 2023-04-17

## 2023-04-17 PROBLEM — I70.0 AORTIC ATHEROSCLEROSIS (HCC): Status: ACTIVE | Noted: 2023-04-17

## 2023-04-17 NOTE — TELEPHONE ENCOUNTER
----- Message from Kenneth Reinoso M.D. sent at 4/14/2023  1:53 PM PDT -----  Called the patient left a message, please notify her that her diabetes test is much improved at 5.7% a decrease from 6.2%, normal is less than 5.7% so she is basically at a high normal blood sugar average.  She does not have diabetes.  Have her continue on a good nutrition and exercise program.

## 2023-06-08 ENCOUNTER — DOCUMENTATION (OUTPATIENT)
Dept: HEALTH INFORMATION MANAGEMENT | Facility: OTHER | Age: 68
End: 2023-06-08
Payer: MEDICARE

## 2023-07-03 ENCOUNTER — APPOINTMENT (OUTPATIENT)
Dept: OBGYN | Facility: CLINIC | Age: 68
End: 2023-07-03
Payer: MEDICARE

## 2023-07-18 DIAGNOSIS — G47.33 OSA (OBSTRUCTIVE SLEEP APNEA): ICD-10-CM

## 2023-08-08 ENCOUNTER — APPOINTMENT (OUTPATIENT)
Dept: OBGYN | Facility: CLINIC | Age: 68
End: 2023-08-08
Payer: MEDICARE

## 2023-08-14 ENCOUNTER — HOSPITAL ENCOUNTER (OUTPATIENT)
Dept: RADIOLOGY | Facility: MEDICAL CENTER | Age: 68
End: 2023-08-14
Attending: INTERNAL MEDICINE
Payer: MEDICARE

## 2023-08-14 DIAGNOSIS — Z12.31 VISIT FOR SCREENING MAMMOGRAM: ICD-10-CM

## 2023-08-14 PROCEDURE — 77063 BREAST TOMOSYNTHESIS BI: CPT

## 2023-09-08 ENCOUNTER — OFFICE VISIT (OUTPATIENT)
Dept: SLEEP MEDICINE | Facility: MEDICAL CENTER | Age: 68
End: 2023-09-08
Attending: NURSE PRACTITIONER
Payer: MEDICARE

## 2023-09-08 VITALS
HEIGHT: 63 IN | RESPIRATION RATE: 16 BRPM | DIASTOLIC BLOOD PRESSURE: 80 MMHG | WEIGHT: 187 LBS | SYSTOLIC BLOOD PRESSURE: 124 MMHG | OXYGEN SATURATION: 96 % | BODY MASS INDEX: 33.13 KG/M2 | HEART RATE: 60 BPM

## 2023-09-08 DIAGNOSIS — G47.33 OSA (OBSTRUCTIVE SLEEP APNEA): ICD-10-CM

## 2023-09-08 PROCEDURE — 99213 OFFICE O/P EST LOW 20 MIN: CPT | Performed by: NURSE PRACTITIONER

## 2023-09-08 PROCEDURE — 3074F SYST BP LT 130 MM HG: CPT | Performed by: NURSE PRACTITIONER

## 2023-09-08 PROCEDURE — 3079F DIAST BP 80-89 MM HG: CPT | Performed by: NURSE PRACTITIONER

## 2023-09-08 ASSESSMENT — FIBROSIS 4 INDEX: FIB4 SCORE: 0.98
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
## 2023-09-13 PROBLEM — H35.3131 EARLY DRY STAGE NONEXUDATIVE AGE-RELATED MACULAR DEGENERATION OF BOTH EYES: Status: RESOLVED | Noted: 2023-04-17 | Resolved: 2023-09-13

## 2023-09-13 PROBLEM — I27.20 PULMONARY HYPERTENSION (HCC): Status: RESOLVED | Noted: 2023-04-17 | Resolved: 2023-09-13

## 2023-09-13 PROBLEM — I70.0 AORTIC ATHEROSCLEROSIS (HCC): Status: RESOLVED | Noted: 2023-04-17 | Resolved: 2023-09-13

## 2023-09-13 PROBLEM — R73.03 PREDIABETES: Status: RESOLVED | Noted: 2023-04-17 | Resolved: 2023-09-13

## 2023-09-13 PROBLEM — F39 MOOD DISORDER (HCC): Status: RESOLVED | Noted: 2023-04-17 | Resolved: 2023-09-13

## 2023-09-13 PROBLEM — E55.9 VITAMIN D DEFICIENCY: Status: RESOLVED | Noted: 2023-04-17 | Resolved: 2023-09-13

## 2023-11-15 ENCOUNTER — NON-PROVIDER VISIT (OUTPATIENT)
Dept: OCCUPATIONAL MEDICINE | Facility: CLINIC | Age: 68
End: 2023-11-15

## 2023-11-15 DIAGNOSIS — Z02.89 ENCOUNTER FOR OCCUPATIONAL HEALTH ASSESSMENT: ICD-10-CM

## 2023-11-15 PROCEDURE — 86580 TB INTRADERMAL TEST: CPT | Performed by: NURSE PRACTITIONER

## 2023-11-15 NOTE — PROGRESS NOTES
Mari Kinsey is a 68 y.o. female here for a non-provider visit for PPD placement -- Step 1 of 1    Reason for PPD:  work requirement    1. TB evaluation questionnaire completed by patient? Yes      -  If any answers marked yes did you contact a provider prior to placing? Yes  2.  Patient notified to return to clinic for reading on: Friday Nov. 17th after 3:00pm or Saturday Nov 18th before 3:00pm  3.  PPD Placement documentation completed on TB evaluation questionnaire? Yes  4.  Location of TB evaluation questionnaire filed: [facility]

## 2023-11-17 ENCOUNTER — OFFICE VISIT (OUTPATIENT)
Dept: URGENT CARE | Facility: CLINIC | Age: 68
End: 2023-11-17
Payer: MEDICARE

## 2023-11-17 ENCOUNTER — NON-PROVIDER VISIT (OUTPATIENT)
Dept: OCCUPATIONAL MEDICINE | Facility: CLINIC | Age: 68
End: 2023-11-17

## 2023-11-17 VITALS
DIASTOLIC BLOOD PRESSURE: 60 MMHG | WEIGHT: 187 LBS | TEMPERATURE: 99.6 F | OXYGEN SATURATION: 94 % | RESPIRATION RATE: 16 BRPM | BODY MASS INDEX: 34.41 KG/M2 | SYSTOLIC BLOOD PRESSURE: 118 MMHG | HEIGHT: 62 IN | HEART RATE: 78 BPM

## 2023-11-17 DIAGNOSIS — U07.1 COVID-19 VIRUS INFECTION: ICD-10-CM

## 2023-11-17 LAB — TB WHEAL 3D P 5 TU DIAM: NORMAL MM

## 2023-11-17 PROCEDURE — 3074F SYST BP LT 130 MM HG: CPT | Performed by: PHYSICIAN ASSISTANT

## 2023-11-17 PROCEDURE — 3078F DIAST BP <80 MM HG: CPT | Performed by: PHYSICIAN ASSISTANT

## 2023-11-17 PROCEDURE — 99213 OFFICE O/P EST LOW 20 MIN: CPT | Performed by: PHYSICIAN ASSISTANT

## 2023-11-17 ASSESSMENT — ENCOUNTER SYMPTOMS
DIZZINESS: 0
MYALGIAS: 1
VOMITING: 0
SORE THROAT: 1
ABDOMINAL PAIN: 0
DIARRHEA: 0
COUGH: 1
HEADACHES: 1
CHILLS: 1
SHORTNESS OF BREATH: 0
FEVER: 1

## 2023-11-17 ASSESSMENT — FIBROSIS 4 INDEX: FIB4 SCORE: 1
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
## 2023-11-20 ENCOUNTER — APPOINTMENT (OUTPATIENT)
Dept: MEDICAL GROUP | Facility: MEDICAL CENTER | Age: 68
End: 2023-11-20
Payer: MEDICARE

## 2024-01-13 PROBLEM — H90.5 SENSORINEURAL HEARING LOSS: Status: ACTIVE | Noted: 2024-01-13

## 2024-01-18 ENCOUNTER — TELEPHONE (OUTPATIENT)
Dept: MEDICAL GROUP | Facility: MEDICAL CENTER | Age: 69
End: 2024-01-18

## 2024-01-18 ENCOUNTER — OFFICE VISIT (OUTPATIENT)
Dept: MEDICAL GROUP | Facility: MEDICAL CENTER | Age: 69
End: 2024-01-18
Payer: MEDICARE

## 2024-01-18 VITALS
HEIGHT: 62 IN | DIASTOLIC BLOOD PRESSURE: 74 MMHG | WEIGHT: 189 LBS | TEMPERATURE: 98.1 F | BODY MASS INDEX: 34.78 KG/M2 | SYSTOLIC BLOOD PRESSURE: 126 MMHG | HEART RATE: 77 BPM | RESPIRATION RATE: 16 BRPM | OXYGEN SATURATION: 97 %

## 2024-01-18 DIAGNOSIS — E78.5 DYSLIPIDEMIA: Chronic | ICD-10-CM

## 2024-01-18 DIAGNOSIS — R73.09 IMPAIRED GLUCOSE METABOLISM: ICD-10-CM

## 2024-01-18 DIAGNOSIS — Z86.79 HISTORY OF HYPERTENSION: ICD-10-CM

## 2024-01-18 DIAGNOSIS — I70.0 ATHEROSCLEROSIS OF AORTA (HCC): ICD-10-CM

## 2024-01-18 DIAGNOSIS — I27.20 PULMONARY HYPERTENSION, UNSPECIFIED (HCC): ICD-10-CM

## 2024-01-18 DIAGNOSIS — C81.90 HODGKIN LYMPHOMA, UNSPECIFIED HODGKIN LYMPHOMA TYPE, UNSPECIFIED BODY REGION (HCC): ICD-10-CM

## 2024-01-18 DIAGNOSIS — Z86.16 HISTORY OF COVID-19: ICD-10-CM

## 2024-01-18 DIAGNOSIS — E55.9 VITAMIN D DEFICIENCY: ICD-10-CM

## 2024-01-18 DIAGNOSIS — E66.9 OBESITY (BMI 30.0-34.9): ICD-10-CM

## 2024-01-18 PROCEDURE — 3078F DIAST BP <80 MM HG: CPT | Performed by: INTERNAL MEDICINE

## 2024-01-18 PROCEDURE — 3074F SYST BP LT 130 MM HG: CPT | Performed by: INTERNAL MEDICINE

## 2024-01-18 PROCEDURE — 99999 PR NO CHARGE: CPT | Performed by: INTERNAL MEDICINE

## 2024-01-18 ASSESSMENT — ACTIVITIES OF DAILY LIVING (ADL): BATHING_REQUIRES_ASSISTANCE: 0

## 2024-01-18 ASSESSMENT — PATIENT HEALTH QUESTIONNAIRE - PHQ9: CLINICAL INTERPRETATION OF PHQ2 SCORE: 0

## 2024-01-18 ASSESSMENT — FIBROSIS 4 INDEX: FIB4 SCORE: 1

## 2024-01-18 ASSESSMENT — ENCOUNTER SYMPTOMS: GENERAL WELL-BEING: GOOD

## 2024-01-18 NOTE — LETTER
[employer]  Kenneth Reinoso M.D.  [id number]  [place] [address]  Fax: [phone]   Authorization for Release/Disclosure of   Protected Health Information   Name: FLORENCIO HAMILTON : 1955 SSN: [id number]   Address: [address] [place] [address]  [place] [address] Phone:    [phone] (home)    I authorize the entity listed below to release/disclose the PHI below to:   [employer] [facility]/Kenneth Reinoso M.D. and Kenneth Reinoso M.D.   Provider or Entity Name:                                                     Dr Mondragon ([facility])   Address   City, [facility]   Phone:      Fax:        [phone]   Reason for request: continuity of care   Information to be released: Past 12 mo of records   [  ] LAST COLONOSCOPY,  including any PATH REPORT and follow-up  [  ] LAST FIT/COLOGUARD RESULT [  ] LAST DEXA  [  ] LAST MAMMOGRAM  [  ] LAST PAP  [  ] LAST LABS [  ] RETINA EXAM REPORT  [  ] IMMUNIZATION RECORDS  [ x ] Release all info      [  ] Check here and initial the line next to each item to release ALL health information INCLUDING  _____ Care and treatment for drug and / or alcohol abuse  _____ HIV testing, infection status, or AIDS  _____ Genetic Testing    DATES OF SERVICE OR TIME PERIOD TO BE DISCLOSED: _____________  I understand and acknowledge that:  * This Authorization may be revoked at any time by you in writing, except if your health information has already been used or disclosed.  * Your health information that will be used or disclosed as a result of you signing this authorization could be re-disclosed by the recipient. If this occurs, your re-disclosed health information may no longer be protected by State or Federal laws.  * You may refuse to sign this Authorization. Your refusal will not affect your ability to obtain treatment.  * This Authorization becomes effective upon signing and will  on (date) __________.      If no date is indicated, this Authorization will  one (1) year from  the signature date.    Name: Mari Kinsey  Signature:             Continuity of Care Date:   1/19/2024     PLEASE FAX REQUESTED RECORDS BACK TO: [phone]

## 2024-01-18 NOTE — PROGRESS NOTES
Subjective     Mari Kinsey is a 68 y.o. female who presents with Annual Exam            HPI             Medicare wellness  Current supplements: Reviewed  Chronic narcotic pain medicines: No  Allergies:  reviewed  Eye exam annually glasses for reading  Sees sleep group sleep apnea on cpap  Sees  oncology   Sees  dermatology   Screening: Reviewed  Depressive Symptoms: Denies feeling down, depressed or hopeless. Denies loss of interest or pleasure in doing things   ADLs: Denies needing help with using telephone, transportation, shopping, preparing meals, housework, laundry, or managing medication or money.    Independent with bathing, hygiene, feeding, toileting, dressing    Memory concerns: Denies difficulty remembering details of conversations, events and upcoming appointments.  Hearing problems: Denies.   Recent falls no  , does enjoy baking and eats sweets, trying to minimize carbohydrate portion serving sizes, incorporating more fiber in her diet  Keeping active walking 5-6 days 2 miles and during the summer using her pool   Has completed balance classes at [facility]  Current social contact/activities: Reviewed  Green tea in am, black tea at noon, no coffee, no soda, drinks milk three per day, drinks water as well.  History of covid in november no sequelae symptoms resolved rather quickly, no shortness of breath related to the infection.  Volunteer at [employer] three times per week and does [employer] choir         Current Outpatient Medications   Medication Sig Dispense Refill    Nirmatrelvir&Ritonavir 300/100 20 x 150 MG & 10 x 100MG Tablet Therapy Pack Take 300 mg nirmatrelvir (two 150 mg tablets) with 100 mg ritonavir (one 100 mg tablet) by mouth, with all three tablets taken together twice daily for 5 days. 30 Each 0    levothyroxine (SYNTHROID) 200 MCG Tab TAKE 1 TABLET BY MOUTH EVERY DAY IN THE MORNING ON AN EMPTY STOMACH 90 Tablet 3    Multiple Vitamins-Minerals  (PRESERVISION AREDS 2 PO) Take  by mouth.      B Complex Vitamins (B COMPLEX PO) Take  by mouth.      vitamin D (CHOLECALCIFEROL) 1000 UNIT Tab Take 2,000 Units by mouth every day.       No current facility-administered medications for this visit.         s/p bunion surgery left foot  Sees podiatry   12/11 podiatry note  1/13 bunion surgery      Sleep apnea  2/11 [facility] sleep study moderate obstructive sleep apnea syndrome with apnea-hypotony index 28, low saturation 83%, successful CPAP titration final pressure 9 cm with improvement in apnea-hypotony index to 1.5 with low saturation 91%  3/11 [facility] note on cpap 9  3/12 [facility] note on cpap 9  12/12 off cpap  12/14 back on cpap 9  12/20/15 on cpap 9  10/10/17sleep note continue cpap 9  11/20/17 echo normal LV size and function, EF 70%, mild tricuspid regurgitation, RVSP 35  10/19/18 sleep note on cpap 9  10/21/19 sleep note on cpap 9   10/21/20 sleep note on cpap 9  5/4/21 sleep note on cpap 9  5/9/22 sleep note continue cpap 9  11/19/22 to 12/18/22 resmed airview compliance report average use 5 hours 33 minutes on cpap 9, 90% compliance, mask leak 2.9 l/min  12/19/22 sleep note compliance report 93% compliance, mask leak 0.4, continues on cpap 9  9/8/23 sleep note on cpap 9 compliance 93%, AHI 0.4, follow-up 1 year     Skin lesion followed by dermatology Dr. 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, will follow up with    2/1/22 dexa LS-0.6,hip+0.7  3/14/22 tdap  11/14/22 prevnar 20  12/16/22 A1c 6.2%  12/16/22 vit d 40  8/15/23  mammogram  9/5/23 flu  9/19/23 rsv  9/25/23 covid     Microscopic hematuria  12/19/14 UA 2-5 RBC repeat in 1 month  1/21/15 UA trace blood   1/27/15 ultrasound renal negative  2/15 drop of urine one more time, send out UA evaluate microscopic hematuria , if still positive for urology  2/27/15 UA positive blood refer to urology  4/4/15 CT abdomen and pelvis with and without; retroperitoneal periaortic pericaval and retrocrural adenopathy, mesenteric adenopathy, no hydronephrosis or urolithiasis  10/16/15 urology note microscopic hematuria workup, CT urogram negative, cystoscopy bladder neck inflammation with cytology negative, hematuria has resolved; followup 6 months  4/13/16 urology [place] note UA and cytology  12/28/18 UA   8/10/20 UA negative done at [facility]     Macular drusen  12/20/18 referral to   2/13/19  eye exam macular degeneration, continue antioxidants and areds formula  7/7/20  eye exam  5/23/22  retina specialist macular drusen, posterior vitreous detachment, senile cataract  9/6/22 [place] [facility] macular drusen    9/12/23  eye care professional note posterior vitreous detachment observation, glaucoma suspect  9/26/23 [place] [facility] note    Impaired glucose metabolism  12/28/18  A1c 5.7%  12/31/19 A1c 5.7%  11/5/21 A1c 5.4%  12/16/22 A1c 6.2%  4/14/23 A1c 5.7%    Hypothyroid  9/10 tsh 1.9 on levothyroxine 112 mcg  12/9/11 tsh 4.1 on levothyroxine 112 mcg; increase to levothyroxine 125 mcg  2/12 tsh 2.4 cont levothyroxine 125 mcg  1/13 tsh 3.5 on levothyroxine 125 mcg  2/28/14 tsh 3.9 on levothyroxine 125 mcg  12/19/14 tsh 3.8 on levothyroxine 125 mcg  12/24/15 tsh 2.0 on levothyroxine 125 mcg  12/22/16 tsh 3.3 on levothyroxine 125 mcg   12/29/17 tsh 2.6 on levothyroxine 125 mcg  12/28/18 tsh 6.5 on levothyroxine 125 mcg, change to levothyroxine 137 mcg, repeat tsh 6 weeks  2/14/19 tsh 2.8 on levothyroxine 137 mcg   12/31/19 tsh 9.3 on levothyroxine 137 mcg  taking daily, change to 150 mcg daily repeat tsh in 6 weeks ordered  2/4/20 tsh 2.4 on levothyroxine 150 mcg  8/7/20 tsh 2.9 on levothyroxine 150 mcg done [facility]  3/12/21 tsh 3.4 on levothyroxine 150 mcg done at [facility]  11/5/21 tsh 7.7 ?thyroid dose, patient has been drinking tea 10 minutes after thyroid medication, will stop tea for 30 minutes, repeat tsh 6 weeks   1/4/22 tsh 11.0 on levothyroxine 150 mcg change to 200 mcg   3/21/22 tsh 0.4,free t4 1.59 on 200 mcg daily   12/16/22 tsh 0.85 on levothyroxine 200 mcg qday     history of Lymphoma  4/4/15 CT abdomen and pelvis with and without; retroperitoneal periaortic pericaval and retrocrural adenopathy, mesenteric adenopathy, no hydronephrosis or urolithiasis  12/11/15 ,,alk phos 170,bili 1.2   12/20/15 ultrasound liver pending, repeat liver enzymes and secondary workup pending, CT abdomen and pelvis ordered at urgent care pending follow-up lymphadenopathy  12/24/15 ultrasound abdomen and biliary tree, prominent liver size, no focal mass, gallbladder contains numerous mobile stones, no thickening or pericholecystic fluid is noted, maximum diameter common bile duct 9.8 which is enlarged, 2.6 x 2.2 x 2.6 cm nodule hypoechoic area in the pancreatic body not well visualized because of gas  12/24/15 AST 34,ALT 76,GGT 76.alk phos 88,bili 0.5,acute hep panel negative, iron 45,%sat 13,ferritin 67,AMA 22 (20-25 equivocal),F-actin Ab IgG negative,RE neg,SPEP neg  12/26/15 CT with contrast pending follow-up adenopathy on previous CT and possible pancreatic lesion on ultrasound, referral to surgery for cholelithiasis.  1/12/16  surgery scheduled for laparoscopic cholecystectomy, laparoscopic biopsy of lymph nodes, conversion to open procedure to complete biopsy if necessary to rule out lymphoma  2/3/16 laparoscopic mesenteric lymph node biopsy limited sampling, partial involvement follicular lymphoma B-cell origin favor low-grade 1-2/3, flow cytometry  CD10 monoclonal B cells, demonstrating lambda light chain expression and call expression of CD10 with CD19, CD20, negative for CD5 and CD43  2/19/16 CT/PET hypermetabolic activity within retrocrural, periaortic, aortocaval space, proximal pelvic adenopathy within the left common iliac chain, hypermetabolic adenopathy within mesentery  3/16/16  oncology consultation; 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 consistent with low-grade non-hodgkin's lymphoma follicular-type CD10 and CD20 positive, likely low-grade apparently indolent course, most likely will need observation, recommend complete workup including bone marrow biopsy, LDH, CMP,and once complete will calculate FLPI score  4/6/16  oncology note wbc 4.6,hgb 13.9,hct 43,plt 240; stage IIIa follicular lymphoma low-grade, recommend observation follow-up every 3 months first year and then every 6 months, CBC, CMP, LDH, beta-2 microglobulin and imaging studies as indicated, follow up 3 months  7/6/16  oncology note, wbc 7.7,hgb 13.6,hct 40,plt 304, ,beta 2 microglobulin 2.5 (1.1-2.4),uric 5.7;follow up 3 months  10/5/16  oncology note clinically no signs of recurrence, repeat CT chest, abdomen, pelvis in january, CMP, LDH today, follow-up 3 months  12/27/16 wbc 7.7 (49%N,30%L)  12/29/16 CT abdomen and pelvis with; interval resolution of retrocrural, para-aortic, paracaval, and mesenteric adenopathy, no adenopathy appreciated in pelvis, stable 2 cm or less diameter nodules right middle lobe  1/17/17  oncology note, doing well with recent CT showing no evidence of adenopathy, repeat labs prior to next visit CBC, CMP, LDH, beta-2 microglobulin, follow-up 6 months  7/12/17 cbc,cmp normal,,beta 2 microglobulin  7/18/17  oncology note no evidence disease progression  12/29/17 wbc 4.3 (61%N,18%L)  1/16/18  oncology note no evidence of disease progression, recent labs  cbc,cmp,ldh normal, follow-up 6 months  7/30/18  oncology note stable no recurrence of disease, labs ordered  1/28/19  oncology note repeat labs follow up 6 months  8/7/19 wbc 8.0,hgb 13,hct 43,,cmp negative  8/14/19  oncology note no clinical symptoms, laboratory work reviewed, follow-up 1 year  8/14/20 dr.reganti chairez note no signs of disease recurrence or progression, continue observation  3/22/21 colonoscopy per [facility] 3 mm benign-appearing sessile polyp pathology pending, pathology from the terminal ileum and 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, 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, will follow up with    5/12/21 dr.reganti chairez note reviewed recent pathology colonoscopy showing terminal ileum biopsies positive for follicular lymphoma, currently asymptomatic, CT scan and labs ordered, follow-up depending on results  6/4/21 CT chest, abdomen, pelvis with; stable mildly enlarged mesenteric lymph nodes compatible with low-grade lymphoma similar to CT on 12/29/16 but decreased compared to CT/PET 2/19/16 measuring up to 1.3 cm left mesentery, similar appearance of few small pulmonary nodules up to 4 mm left lower lobe (previously 4 mm on CT chest on 7/4/07)  12/16/21 dr.reganti chairez note no signs of disease progression, continue to monitor  5/5/22  oncology note stable, recommend repeat CT chest, abdomen, pelvis, labs ordered  6/28/22 CT chest, abdomen, pelvis per oncology no new abnormalities, similar appearance few minimally enlarged intra-abdominal lymph nodes up to 1.1 cm (previously 1.3 cm)  7/6/22 dr.reganti chairez note no signs or symptoms of disease progression, continue routine surveillance follow-up   12/16/22 wbc 6.8  1/4/23 dr.reganti chairez note remains asymptomatic, follow-up 6 months  7/6/23  oncology note stable   1/17/24  oncology note stable no signs of progression or transformation,  continue watch and wait approach, follow-up 6 months     history of hypertension  7/9/20 start lisinopril 10 mg  8/7/20 on lisinopril 10 mg urine mac<30 done at [facility]  4/12/21 off lisinopril due to weight loss  4/22/22 CT calcium score total 0.0, incidental 4.5 nodule left lower lobe (6/4/21 CT chest, abdomen, pelvis with; similar appearance of few small pulmonary nodules up to 4 mm left lower lobe (previously 4 mm on CT chest on 7/4/07)     Dyslipidemia  9/10 chol 222,trig 144,hdl 48,ldl 144  12/9/11 chol 230,trig 106,hdl 55,ldl 154  1/13 chol 224,trig 109,hdl 46,ldl 156; 10 yr risk 4%  2/28/14 chol 226,trig 127,hdl 64,ldl 137  12/19/14 chol 205,trig 111,hdl 51,ldl 132  4/23/15 chol 212,trig 106,hdl 58,ldl 133  12/24/15 chol 212,trig 56,hdl 62,ldl 139  12/27/16 chol 231,trig 72,hdl 65,ldl 152  12/29/17 chol 208,trig 70,hdl 63,ldl 131  12/28/18 chol 219,trig 199,hdl 57,ldl 122  12/31/19 chol 221,trig 178,hdl 57,ldl 128; 10 year risk 5.3%  8/7/20 chol 233,trig 143,hdl 56,ldl 150 done at [facility], declines statin  3/12/21 chol 254,trig 121,hdl 63,ldl 166 done at [facility]; 10 year risk 5.4%, declined statin, will work on diet, exercise, repeat labs 6 months printed to mail to her  11/5/21 chol 213,trig 135,hdl 59,ldl 127  1/4/22 chol 223,trig 101,hdl 56,ldl 147, tsh 11 so will increase synthroid and repeat tsh 8 weeks  3/21/22 chol 241,trig 162,hdl 61,ldl 148  4/22/22 CT calcium score total 0.0   12/16/22 chol 212,trig 143,hdl 54,ldl 129       Deviated nasal septum     Allergic rhinitis  5/14/14  allergy note tolerating immunotherapy  8/1/15  allergy note on immunotherapy  9/6/16  allergy note allergic rhinitis tolerating immunotherapy  3/19/18  allergy note off immunotherapy x1 year, symptoms restarted this past winter, patient would like to restart immunotherapy  10/30/18  allergy note stable on immunotherapy  10/22/19  allergy note continue immunotherapy  12/23/19  monthly shots per   20 monthly shots per    21 stopped allergy shots last month once cat                Patient Active Problem List   Diagnosis    Hypothyroid    Preventative health care    S/p bunion surgery left foot    Deviated nasal septum    Dyslipidemia    Sleep apnea    Skin lesion    Family history of breast cancer in sister    Macular drusen    Obesity (BMI 30.0-34.9)    Microscopic hematuria    Hodgkin lymphoma (HCC)    Allergic rhinitis    History of hypertension    Follicular lymphoma (HCC)    Cataract    Impaired glucose metabolism    Sensorineural hearing loss         ROS:    Ostomy or other tubes or amputations no  Chronic oxygen use no     Gait: normal. Uses assistive device :  no       Depression Screening  Little interest or pleasure in doing things?  0 - not at all  Feeling down, depressed , or hopeless? 0 - not at all  Patient Health Questionnaire Score: 0     If depressive symptoms identified deferred to follow up visit unless specifically addressed in assessment and plan.    Interpretation of PHQ-9 Total Score   Score Severity   1-4 No Depression   5-9 Mild Depression   10-14 Moderate Depression   15-19 Moderately Severe Depression   20-27 Severe Depression    Screening for Cognitive Impairment  Do you or any of your friends or family members have any concern about your memory? No  Three Minute Recall (Banana, Sunrise, Chair) 3/3    Nakul clock face with all 12 numbers and set the hands to show 20 past 8.  Yes    Cognitive concerns identified deferred for follow up unless specifically addressed in assessment and plan.    Fall Risk Assessment  Has the patient had two or more falls in the last year or any fall with injury in the last year?  No    Safety Assessment  Do you always wear your seatbelt?  Yes  Any changes to home needed to function safely? No  Difficulty hearing.  No  Patient counseled about all safety risks that were identified.    Functional Assessment  ADLs  Are there any barriers preventing you from cooking for yourself or meeting nutritional needs?  No.    Are there any barriers preventing you from driving safely or obtaining transportation?  No.    Are there any barriers preventing you from using a telephone or calling for help?  No    Are there any barriers preventing you from shopping?  No.    Are there any barriers preventing you from taking care of your own finances?  No    Are there any barriers preventing you from managing your medications?  No    Are there any barriers preventing you from showering, bathing or dressing yourself? No    Are there any barriers preventing you from doing housework or laundry? No  Are there any barriers preventing you from using the toilet?No  Are you currently engaging in any exercise or physical activity?  No.      Self-Assessment of Health  What is your perception of your health? Good  Do you sleep more than six hours a night? Yes  In the past 7 days, how much did pain keep you from doing your normal work? None  Do you spend quality time with family or friends (virtually or in person)? Yes  Do you usually eat a heart healthy diet that constists of a variety of fruits, vegetables, whole grains and fiber? Yes  Do you eat foods high in fat and/or Fast Food more than three times per week? No    Advance Care Planning  Do you have an Advance Directive, Living Will, Durable Power of , or POLST? Yes  Advance Directive       filed in EHR            Patient Care Team:  Kenneth Reinoso M.D. as PCP - General  Kenneth Reinoso M.D. as PCP - [facility] Paneled  Marguerite Barajas R.N. (Inactive) as   [facility] as Respiratory Therapist (DME Supplier)      Health Care Screening recommendations reviewed with patient today and updated or ordered.  DPA/Advanced directive: Completed/Information provided.   Referrals for PT/OT/Nutrition counseling/Behavioral Health/Smoking cessation as above if indicated  Discussion  "today about general wellness and lifestyle habits:    Prevent falls and reduce trip hazards;   Have a working fire alarm and carbon monoxide detector;   Engage in regular physical activity and social activities;   Use sun protection when outdoors.     ROS           Objective     Pulse 77   Temp 36.7 °C (98.1 °F)   Resp 16   Ht 1.575 m (5' 2\")   Wt 85.7 kg (189 lb)   SpO2 97%   BMI 34.57 kg/m²      Physical Exam  Vitals and nursing note reviewed.   Constitutional:       Appearance: Normal appearance.   HENT:      Head: Normocephalic and atraumatic.      Right Ear: External ear normal.      Left Ear: External ear normal.   Eyes:      Conjunctiva/sclera: Conjunctivae normal.   Cardiovascular:      Rate and Rhythm: Normal rate and regular rhythm.      Heart sounds: Normal heart sounds.   Pulmonary:      Effort: Pulmonary effort is normal.      Breath sounds: Normal breath sounds.   Abdominal:      General: There is no distension.   Musculoskeletal:         General: No swelling.   Skin:     Coloration: Skin is not jaundiced.      Findings: No bruising.   Neurological:      General: No focal deficit present.      Mental Status: She is alert.   Psychiatric:         Mood and Affect: Mood normal.         Behavior: Behavior normal.                             Assessment & Plan         Assessment  #1 medicare wellness    #2  Sleep apnea on CPAP, followed by the sleep group    #3 hypothyroid on levothyroxine 200 mcg daily last TSH December 2022 0.85    #4 Hodgkin's lymphoma, last CT chest, abdomen, pelvis stable lymph nodes in June 2022 recently saw hematology oncology January 17, continue surveillance follow-up 6 months    #5 impaired glucose metabolism A1c 5.7%    #6 macular drusen followed by [facility] and Dr. Malave ophthalmology    #7 BMI 34.57 exercises in her pool during the summer months, walks during the day as well, no balance issues    #8 skin lesions followed by Dr. Mondragon dermatology     #9 aortic " atherosclerosis    #10 mild pulmonary hypertension by echo 2017 RVSP 35 related to sleep apnea    #11 vitamin D deficiency    #12 occasional nocturia      Plan  #!  Health maintenance reviewed and updated    #2 old records hematology oncology    #3 old records dermatology    #4 continue CPAP follow-up sleep group    #5 continue walking for exercise, swimming during the summer, she can also go to the gym during the winter and use the pool at the gym and also use the elliptical or stationary bike, she will start weight training every other day, she has lifted weights in the past and understands to maintain good form also would recommend increasing protein intake total 100 g of protein per day, limit sweets, candies, processed foods, monitor carbohydrate portion serving sizes    #6 Labs ordered    #7 continue Synthroid, dose depending on lab results    #8 continue sunscreen use when outdoors    #9 will notify with lab results, follow-up 1 year    #10 up-to-date on vaccines

## 2024-01-19 NOTE — TELEPHONE ENCOUNTER
Called and informed patient of PCP recommendations below and advised that new Rx was sent into pharmacy.     Nothing further needed at this time, patient will call us back if she does not feel the Effexor is working adequately for her or has any other questions/concerns.    Encounter Closed.       KATHARINE faxed.

## 2024-01-20 DIAGNOSIS — E03.8 OTHER SPECIFIED HYPOTHYROIDISM: Chronic | ICD-10-CM

## 2024-01-20 RX ORDER — LEVOTHYROXINE SODIUM 0.2 MG/1
TABLET ORAL
Qty: 90 TABLET | Refills: 0 | Status: SHIPPED | OUTPATIENT
Start: 2024-01-20 | End: 2024-02-14

## 2024-02-13 ENCOUNTER — HOSPITAL ENCOUNTER (OUTPATIENT)
Dept: LAB | Facility: MEDICAL CENTER | Age: 69
End: 2024-02-13
Attending: INTERNAL MEDICINE
Payer: MEDICARE

## 2024-02-13 DIAGNOSIS — Z86.79 HISTORY OF HYPERTENSION: ICD-10-CM

## 2024-02-13 DIAGNOSIS — R73.09 IMPAIRED GLUCOSE METABOLISM: ICD-10-CM

## 2024-02-13 DIAGNOSIS — E55.9 VITAMIN D DEFICIENCY: ICD-10-CM

## 2024-02-13 DIAGNOSIS — E78.5 DYSLIPIDEMIA: Chronic | ICD-10-CM

## 2024-02-13 LAB
25(OH)D3 SERPL-MCNC: 53 NG/ML (ref 30–100)
ALBUMIN SERPL BCP-MCNC: 3.9 G/DL (ref 3.2–4.9)
ALBUMIN/GLOB SERPL: 1.4 G/DL
ALP SERPL-CCNC: 86 U/L (ref 30–99)
ALT SERPL-CCNC: 12 U/L (ref 2–50)
ANION GAP SERPL CALC-SCNC: 8 MMOL/L (ref 7–16)
APPEARANCE UR: CLEAR
AST SERPL-CCNC: 15 U/L (ref 12–45)
BACTERIA #/AREA URNS HPF: NEGATIVE /HPF
BILIRUB SERPL-MCNC: 0.3 MG/DL (ref 0.1–1.5)
BILIRUB UR QL STRIP.AUTO: NEGATIVE
BUN SERPL-MCNC: 15 MG/DL (ref 8–22)
CALCIUM ALBUM COR SERPL-MCNC: 9.3 MG/DL (ref 8.5–10.5)
CALCIUM SERPL-MCNC: 9.2 MG/DL (ref 8.4–10.2)
CHLORIDE SERPL-SCNC: 103 MMOL/L (ref 96–112)
CHOLEST SERPL-MCNC: 212 MG/DL (ref 100–199)
CO2 SERPL-SCNC: 28 MMOL/L (ref 20–33)
COLOR UR: YELLOW
CREAT SERPL-MCNC: 0.92 MG/DL (ref 0.5–1.4)
EPI CELLS #/AREA URNS HPF: NORMAL /HPF
EST. AVERAGE GLUCOSE BLD GHB EST-MCNC: 105 MG/DL
FASTING STATUS PATIENT QL REPORTED: NORMAL
GFR SERPLBLD CREATININE-BSD FMLA CKD-EPI: 68 ML/MIN/1.73 M 2
GLOBULIN SER CALC-MCNC: 2.8 G/DL (ref 1.9–3.5)
GLUCOSE SERPL-MCNC: 97 MG/DL (ref 65–99)
GLUCOSE UR STRIP.AUTO-MCNC: NEGATIVE MG/DL
HBA1C MFR BLD: 5.3 % (ref 4–5.6)
HDLC SERPL-MCNC: 55 MG/DL
KETONES UR STRIP.AUTO-MCNC: NEGATIVE MG/DL
LDLC SERPL CALC-MCNC: 123 MG/DL
LEUKOCYTE ESTERASE UR QL STRIP.AUTO: ABNORMAL
MICRO URNS: ABNORMAL
MUCOUS THREADS #/AREA URNS HPF: NORMAL /HPF
NITRITE UR QL STRIP.AUTO: NEGATIVE
PH UR STRIP.AUTO: 6 [PH] (ref 5–8)
POTASSIUM SERPL-SCNC: 4.5 MMOL/L (ref 3.6–5.5)
PROT SERPL-MCNC: 6.7 G/DL (ref 6–8.2)
PROT UR QL STRIP: NEGATIVE MG/DL
RBC # URNS HPF: NORMAL /HPF
RBC UR QL AUTO: NEGATIVE
SODIUM SERPL-SCNC: 139 MMOL/L (ref 135–145)
SP GR UR STRIP.AUTO: 1.02
TRIGL SERPL-MCNC: 170 MG/DL (ref 0–149)
TSH SERPL DL<=0.005 MIU/L-ACNC: 0.23 UIU/ML (ref 0.38–5.33)
WBC #/AREA URNS HPF: NORMAL /HPF

## 2024-02-13 PROCEDURE — 80053 COMPREHEN METABOLIC PANEL: CPT

## 2024-02-13 PROCEDURE — 82306 VITAMIN D 25 HYDROXY: CPT

## 2024-02-13 PROCEDURE — 81001 URINALYSIS AUTO W/SCOPE: CPT

## 2024-02-13 PROCEDURE — 36415 COLL VENOUS BLD VENIPUNCTURE: CPT

## 2024-02-13 PROCEDURE — 80061 LIPID PANEL: CPT

## 2024-02-13 PROCEDURE — 83036 HEMOGLOBIN GLYCOSYLATED A1C: CPT

## 2024-02-13 PROCEDURE — 84443 ASSAY THYROID STIM HORMONE: CPT

## 2024-02-14 ENCOUNTER — TELEPHONE (OUTPATIENT)
Dept: MEDICAL GROUP | Facility: MEDICAL CENTER | Age: 69
End: 2024-02-14

## 2024-02-14 ENCOUNTER — GYNECOLOGY VISIT (OUTPATIENT)
Dept: OBGYN | Facility: CLINIC | Age: 69
End: 2024-02-14
Payer: MEDICARE

## 2024-02-14 VITALS — DIASTOLIC BLOOD PRESSURE: 88 MMHG | WEIGHT: 193.3 LBS | BODY MASS INDEX: 35.36 KG/M2 | SYSTOLIC BLOOD PRESSURE: 125 MMHG

## 2024-02-14 DIAGNOSIS — N95.0 PMB (POSTMENOPAUSAL BLEEDING): ICD-10-CM

## 2024-02-14 DIAGNOSIS — E03.8 OTHER SPECIFIED HYPOTHYROIDISM: Chronic | ICD-10-CM

## 2024-02-14 DIAGNOSIS — N81.10 PELVIC ORGAN PROLAPSE QUANTIFICATION STAGE 3 CYSTOCELE: ICD-10-CM

## 2024-02-14 DIAGNOSIS — E03.9 HYPOTHYROIDISM, UNSPECIFIED TYPE: ICD-10-CM

## 2024-02-14 PROCEDURE — 99203 OFFICE O/P NEW LOW 30 MIN: CPT | Performed by: OBSTETRICS & GYNECOLOGY

## 2024-02-14 PROCEDURE — 3074F SYST BP LT 130 MM HG: CPT | Performed by: OBSTETRICS & GYNECOLOGY

## 2024-02-14 PROCEDURE — 3079F DIAST BP 80-89 MM HG: CPT | Performed by: OBSTETRICS & GYNECOLOGY

## 2024-02-14 RX ORDER — LEVOTHYROXINE SODIUM 0.2 MG/1
TABLET ORAL
Qty: 90 TABLET | Refills: 0
Start: 2024-02-14

## 2024-02-14 ASSESSMENT — FIBROSIS 4 INDEX: FIB4 SCORE: 0.94
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
## 2024-02-15 NOTE — TELEPHONE ENCOUNTER
Notified with results, TSH decreased, taking Synthroid 200 mcg daily, will change that to 6 days a week, off day 7, repeat TSH nonfasting blood test in 6 to 8 weeks, order submitted in the computer system.  I will also print out the lab order and we will mail that to her.  Cholesterol stable, CT calcium score was done 2 years ago, no need for statin therapy.  A1c improved.  Urinalysis 2-5 WBC, no evidence of infection.

## 2024-02-16 ENCOUNTER — HOSPITAL ENCOUNTER (OUTPATIENT)
Dept: RADIOLOGY | Facility: MEDICAL CENTER | Age: 69
End: 2024-02-16
Attending: OBSTETRICS & GYNECOLOGY
Payer: MEDICARE

## 2024-02-16 DIAGNOSIS — N95.0 PMB (POSTMENOPAUSAL BLEEDING): ICD-10-CM

## 2024-02-16 PROCEDURE — 76830 TRANSVAGINAL US NON-OB: CPT

## 2024-02-29 ENCOUNTER — APPOINTMENT (RX ONLY)
Dept: URBAN - METROPOLITAN AREA CLINIC 35 | Facility: CLINIC | Age: 69
Setting detail: DERMATOLOGY
End: 2024-02-29

## 2024-02-29 DIAGNOSIS — D22 MELANOCYTIC NEVI: ICD-10-CM

## 2024-02-29 DIAGNOSIS — L82.1 OTHER SEBORRHEIC KERATOSIS: ICD-10-CM

## 2024-02-29 DIAGNOSIS — Z71.89 OTHER SPECIFIED COUNSELING: ICD-10-CM

## 2024-02-29 DIAGNOSIS — L81.4 OTHER MELANIN HYPERPIGMENTATION: ICD-10-CM

## 2024-02-29 DIAGNOSIS — Z87.2 PERSONAL HISTORY OF DISEASES OF THE SKIN AND SUBCUTANEOUS TISSUE: ICD-10-CM

## 2024-02-29 PROBLEM — D23.71 OTHER BENIGN NEOPLASM OF SKIN OF RIGHT LOWER LIMB, INCLUDING HIP: Status: ACTIVE | Noted: 2024-02-29

## 2024-02-29 PROBLEM — D22.5 MELANOCYTIC NEVI OF TRUNK: Status: ACTIVE | Noted: 2024-02-29

## 2024-02-29 PROBLEM — D23.72 OTHER BENIGN NEOPLASM OF SKIN OF LEFT LOWER LIMB, INCLUDING HIP: Status: ACTIVE | Noted: 2024-02-29

## 2024-02-29 PROCEDURE — ? COUNSELING

## 2024-02-29 PROCEDURE — 99213 OFFICE O/P EST LOW 20 MIN: CPT

## 2024-02-29 ASSESSMENT — LOCATION SIMPLE DESCRIPTION DERM
LOCATION SIMPLE: RIGHT PRETIBIAL REGION
LOCATION SIMPLE: LEFT UPPER BACK
LOCATION SIMPLE: RIGHT UPPER BACK
LOCATION SIMPLE: LEFT LOWER BACK
LOCATION SIMPLE: LEFT SHOULDER

## 2024-02-29 ASSESSMENT — LOCATION DETAILED DESCRIPTION DERM
LOCATION DETAILED: LEFT SUPERIOR MEDIAL MIDBACK
LOCATION DETAILED: LEFT MEDIAL UPPER BACK
LOCATION DETAILED: RIGHT SUPERIOR UPPER BACK
LOCATION DETAILED: RIGHT DISTAL PRETIBIAL REGION
LOCATION DETAILED: LEFT POSTERIOR SHOULDER

## 2024-02-29 ASSESSMENT — LOCATION ZONE DERM
LOCATION ZONE: TRUNK
LOCATION ZONE: ARM
LOCATION ZONE: LEG

## 2024-03-04 ENCOUNTER — TELEPHONE (OUTPATIENT)
Dept: SLEEP MEDICINE | Facility: MEDICAL CENTER | Age: 69
End: 2024-03-04

## 2024-03-04 NOTE — TELEPHONE ENCOUNTER
Caller:  Mari    Topic/issue: Mari is needing her CPAP supplies, she has been waiting since January.  Please give her a call.     Callback Number: [phone]    Thank you,   Patito GARCIA

## 2024-03-07 ENCOUNTER — HOSPITAL ENCOUNTER (OUTPATIENT)
Facility: MEDICAL CENTER | Age: 69
End: 2024-03-07
Attending: OBSTETRICS & GYNECOLOGY
Payer: MEDICARE

## 2024-03-07 ENCOUNTER — GYNECOLOGY VISIT (OUTPATIENT)
Dept: OBGYN | Facility: CLINIC | Age: 69
End: 2024-03-07
Payer: MEDICARE

## 2024-03-07 VITALS — DIASTOLIC BLOOD PRESSURE: 67 MMHG | BODY MASS INDEX: 35.76 KG/M2 | WEIGHT: 195.5 LBS | SYSTOLIC BLOOD PRESSURE: 127 MMHG

## 2024-03-07 DIAGNOSIS — N95.0 PMB (POSTMENOPAUSAL BLEEDING): Primary | ICD-10-CM

## 2024-03-07 PROBLEM — I27.20 PULMONARY HYPERTENSION, UNSPECIFIED (HCC): Status: RESOLVED | Noted: 2023-04-17 | Resolved: 2024-03-07

## 2024-03-07 LAB — PATHOLOGY CONSULT NOTE: NORMAL

## 2024-03-07 PROCEDURE — 58100 BIOPSY OF UTERUS LINING: CPT | Performed by: OBSTETRICS & GYNECOLOGY

## 2024-03-07 PROCEDURE — 88305 TISSUE EXAM BY PATHOLOGIST: CPT

## 2024-03-07 ASSESSMENT — FIBROSIS 4 INDEX: FIB4 SCORE: 0.94

## 2024-03-07 NOTE — PROCEDURES
EMB Procedure note     Indications for biopsy: thickened endometrium       Patient was counselled regarding the indications for an endometrial biopsy, the small but potential risks of perforation, infection, or inadequate sampling.  All of the patient’s questions were answered and she consented for an endometrial biopsy. Consent was signed.      Pregnancy test  not indicated     Time out was done to confirm patient, procedure and appropriate equipment was present.       Patient is placed in dorsal lithotomy position and a speculum was placed into the vagina. The cervix was prepped with betadine x3. A tenaculum was placed on the anterior lip of the cervix. The uterus sounded to 6 cm.     An endometrial pipelle was passed through the cervical os into the endometrial cavity. 2 passes were made. Scant tissue was obtained and sent to pathology.      Hemostasis was noted. All instruments were removed.      Patient tolerated the procedure well and there were no complications.  She was instructed that she may have bleeding and cramping but it should be minimal. She is to contact our office with signs of infection, severe pain or fever greater than 101. Follow up for results were arranged.        Surgery request for hysteroscopy dilation and curettage submitted today for diagnosis of thickened endometrium.

## 2024-03-14 ENCOUNTER — APPOINTMENT (OUTPATIENT)
Dept: ADMISSIONS | Facility: MEDICAL CENTER | Age: 69
End: 2024-03-14
Attending: OBSTETRICS & GYNECOLOGY
Payer: MEDICARE

## 2024-03-21 ENCOUNTER — APPOINTMENT (OUTPATIENT)
Dept: MEDICAL GROUP | Facility: PHYSICIAN GROUP | Age: 69
End: 2024-03-21
Attending: FAMILY MEDICINE
Payer: MEDICARE

## 2024-03-22 ENCOUNTER — PRE-ADMISSION TESTING (OUTPATIENT)
Dept: ADMISSIONS | Facility: MEDICAL CENTER | Age: 69
End: 2024-03-22
Attending: OBSTETRICS & GYNECOLOGY
Payer: MEDICARE

## 2024-04-03 ENCOUNTER — ANESTHESIA EVENT (OUTPATIENT)
Dept: SURGERY | Facility: MEDICAL CENTER | Age: 69
End: 2024-04-03
Payer: MEDICARE

## 2024-04-03 ENCOUNTER — HOSPITAL ENCOUNTER (OUTPATIENT)
Facility: MEDICAL CENTER | Age: 69
End: 2024-04-03
Attending: OBSTETRICS & GYNECOLOGY | Admitting: OBSTETRICS & GYNECOLOGY
Payer: MEDICARE

## 2024-04-03 ENCOUNTER — ANESTHESIA (OUTPATIENT)
Dept: SURGERY | Facility: MEDICAL CENTER | Age: 69
End: 2024-04-03
Payer: MEDICARE

## 2024-04-03 VITALS
DIASTOLIC BLOOD PRESSURE: 66 MMHG | HEIGHT: 63 IN | HEART RATE: 64 BPM | WEIGHT: 194.67 LBS | RESPIRATION RATE: 17 BRPM | OXYGEN SATURATION: 97 % | TEMPERATURE: 98.4 F | SYSTOLIC BLOOD PRESSURE: 146 MMHG | BODY MASS INDEX: 34.49 KG/M2

## 2024-04-03 LAB — PATHOLOGY CONSULT NOTE: NORMAL

## 2024-04-03 PROCEDURE — 160041 HCHG SURGERY MINUTES - EA ADDL 1 MIN LEVEL 4: Performed by: OBSTETRICS & GYNECOLOGY

## 2024-04-03 PROCEDURE — 160025 RECOVERY II MINUTES (STATS): Performed by: OBSTETRICS & GYNECOLOGY

## 2024-04-03 PROCEDURE — 700105 HCHG RX REV CODE 258: Performed by: ANESTHESIOLOGY

## 2024-04-03 PROCEDURE — 160009 HCHG ANES TIME/MIN: Performed by: OBSTETRICS & GYNECOLOGY

## 2024-04-03 PROCEDURE — 700101 HCHG RX REV CODE 250: Performed by: ANESTHESIOLOGY

## 2024-04-03 PROCEDURE — 160002 HCHG RECOVERY MINUTES (STAT): Performed by: OBSTETRICS & GYNECOLOGY

## 2024-04-03 PROCEDURE — 88305 TISSUE EXAM BY PATHOLOGIST: CPT

## 2024-04-03 PROCEDURE — 160029 HCHG SURGERY MINUTES - 1ST 30 MINS LEVEL 4: Performed by: OBSTETRICS & GYNECOLOGY

## 2024-04-03 PROCEDURE — 160048 HCHG OR STATISTICAL LEVEL 1-5: Performed by: OBSTETRICS & GYNECOLOGY

## 2024-04-03 PROCEDURE — 160035 HCHG PACU - 1ST 60 MINS PHASE I: Performed by: OBSTETRICS & GYNECOLOGY

## 2024-04-03 PROCEDURE — 58558 HYSTEROSCOPY BIOPSY: CPT | Performed by: OBSTETRICS & GYNECOLOGY

## 2024-04-03 PROCEDURE — 700111 HCHG RX REV CODE 636 W/ 250 OVERRIDE (IP): Mod: JZ | Performed by: ANESTHESIOLOGY

## 2024-04-03 PROCEDURE — 160046 HCHG PACU - 1ST 60 MINS PHASE II: Performed by: OBSTETRICS & GYNECOLOGY

## 2024-04-03 RX ORDER — SODIUM CHLORIDE, SODIUM LACTATE, POTASSIUM CHLORIDE, CALCIUM CHLORIDE 600; 310; 30; 20 MG/100ML; MG/100ML; MG/100ML; MG/100ML
INJECTION, SOLUTION INTRAVENOUS
Status: DISCONTINUED | OUTPATIENT
Start: 2024-04-03 | End: 2024-04-03 | Stop reason: SURG

## 2024-04-03 RX ORDER — LIDOCAINE HYDROCHLORIDE 10 MG/ML
INJECTION, SOLUTION EPIDURAL; INFILTRATION; INTRACAUDAL; PERINEURAL
Status: DISCONTINUED
Start: 2024-04-03 | End: 2024-04-03 | Stop reason: HOSPADM

## 2024-04-03 RX ORDER — MEPERIDINE HYDROCHLORIDE 25 MG/ML
6.25 INJECTION INTRAMUSCULAR; INTRAVENOUS; SUBCUTANEOUS
Status: DISCONTINUED | OUTPATIENT
Start: 2024-04-03 | End: 2024-04-03 | Stop reason: HOSPADM

## 2024-04-03 RX ORDER — DIPHENHYDRAMINE HYDROCHLORIDE 50 MG/ML
12.5 INJECTION INTRAMUSCULAR; INTRAVENOUS
Status: DISCONTINUED | OUTPATIENT
Start: 2024-04-03 | End: 2024-04-03 | Stop reason: HOSPADM

## 2024-04-03 RX ORDER — EPHEDRINE SULFATE 50 MG/ML
INJECTION, SOLUTION INTRAVENOUS PRN
Status: DISCONTINUED | OUTPATIENT
Start: 2024-04-03 | End: 2024-04-03 | Stop reason: SURG

## 2024-04-03 RX ORDER — ONDANSETRON 2 MG/ML
4 INJECTION INTRAMUSCULAR; INTRAVENOUS
Status: DISCONTINUED | OUTPATIENT
Start: 2024-04-03 | End: 2024-04-03 | Stop reason: HOSPADM

## 2024-04-03 RX ORDER — SODIUM CHLORIDE, SODIUM LACTATE, POTASSIUM CHLORIDE, CALCIUM CHLORIDE 600; 310; 30; 20 MG/100ML; MG/100ML; MG/100ML; MG/100ML
INJECTION, SOLUTION INTRAVENOUS CONTINUOUS
Status: DISCONTINUED | OUTPATIENT
Start: 2024-04-03 | End: 2024-04-03 | Stop reason: HOSPADM

## 2024-04-03 RX ORDER — ONDANSETRON 2 MG/ML
INJECTION INTRAMUSCULAR; INTRAVENOUS PRN
Status: DISCONTINUED | OUTPATIENT
Start: 2024-04-03 | End: 2024-04-03 | Stop reason: SURG

## 2024-04-03 RX ORDER — EPINEPHRINE 1 MG/ML(1)
AMPUL (ML) INJECTION
Status: DISCONTINUED
Start: 2024-04-03 | End: 2024-04-03 | Stop reason: HOSPADM

## 2024-04-03 RX ORDER — DEXAMETHASONE SODIUM PHOSPHATE 4 MG/ML
INJECTION, SOLUTION INTRA-ARTICULAR; INTRALESIONAL; INTRAMUSCULAR; INTRAVENOUS; SOFT TISSUE PRN
Status: DISCONTINUED | OUTPATIENT
Start: 2024-04-03 | End: 2024-04-03 | Stop reason: SURG

## 2024-04-03 RX ORDER — HALOPERIDOL 5 MG/ML
1 INJECTION INTRAMUSCULAR
Status: DISCONTINUED | OUTPATIENT
Start: 2024-04-03 | End: 2024-04-03 | Stop reason: HOSPADM

## 2024-04-03 RX ADMIN — GLYCOPYRROLATE 0.2 MG: 0.2 INJECTION INTRAMUSCULAR; INTRAVENOUS at 12:19

## 2024-04-03 RX ADMIN — EPHEDRINE SULFATE 5 MG: 50 INJECTION, SOLUTION INTRAVENOUS at 12:19

## 2024-04-03 RX ADMIN — DEXAMETHASONE SODIUM PHOSPHATE 4 MG: 4 INJECTION INTRA-ARTICULAR; INTRALESIONAL; INTRAMUSCULAR; INTRAVENOUS; SOFT TISSUE at 12:28

## 2024-04-03 RX ADMIN — ONDANSETRON 4 MG: 2 INJECTION INTRAMUSCULAR; INTRAVENOUS at 12:28

## 2024-04-03 RX ADMIN — FENTANYL CITRATE 50 MCG: 50 INJECTION, SOLUTION INTRAMUSCULAR; INTRAVENOUS at 12:31

## 2024-04-03 RX ADMIN — FENTANYL CITRATE 50 MCG: 50 INJECTION, SOLUTION INTRAMUSCULAR; INTRAVENOUS at 12:27

## 2024-04-03 RX ADMIN — SODIUM CHLORIDE, POTASSIUM CHLORIDE, SODIUM LACTATE AND CALCIUM CHLORIDE: 600; 310; 30; 20 INJECTION, SOLUTION INTRAVENOUS at 12:08

## 2024-04-03 ASSESSMENT — PAIN DESCRIPTION - PAIN TYPE
TYPE: SURGICAL PAIN
TYPE: ACUTE PAIN;SURGICAL PAIN
TYPE: ACUTE PAIN;SURGICAL PAIN
TYPE: ACUTE PAIN
TYPE: SURGICAL PAIN
TYPE: ACUTE PAIN;SURGICAL PAIN

## 2024-04-03 ASSESSMENT — FIBROSIS 4 INDEX: FIB4 SCORE: 0.94

## 2024-04-03 ASSESSMENT — PAIN SCALES - GENERAL: PAIN_LEVEL: 1
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
## 2024-04-03 NOTE — ANESTHESIA PREPROCEDURE EVALUATION
Case: [id number] Date/Time: 04/03/24 1145    Procedures:       HYSTEROSCOPY, DILATION AND CURETTAGE      DILATION AND CURETTAGE    Pre-op diagnosis: POSTMENOPAUSAL BLEEDING    Location: [facility] ROOM 27 / SURGERY SAME DAY [facility]    Surgeons: Crista Wallace D.O.            Relevant Problems   ANESTHESIA   (positive) Sleep apnea      CARDIAC   (positive) Atherosclerosis of aorta (HCC)   (positive) Pulmonary hypertension (HCC)      ENDO   (positive) Hypothyroid       Physical Exam    Airway   Mallampati: II  TM distance: >3 FB  Neck ROM: full       Cardiovascular - normal exam  Rhythm: regular  Rate: normal  (-) murmur     Dental - normal exam           Pulmonary - normal exam  Breath sounds clear to auscultation     Abdominal    Neurological - normal exam                   Anesthesia Plan    ASA 3       Plan - general       Airway plan will be LMA          Induction: intravenous    Postoperative Plan: Postoperative administration of opioids is intended.    Pertinent diagnostic labs and testing reviewed    Informed Consent:    Anesthetic plan and risks discussed with patient.    Use of blood products discussed with: patient whom consented to blood products.

## 2024-04-03 NOTE — ANESTHESIA TIME REPORT
Anesthesia Start and Stop Event Times       Date Time Event    4/3/2024 1047 Ready for Procedure     1208 Anesthesia Start     1256 Anesthesia Stop          Responsible Staff  04/03/24      Name Role Begin End    Patrice Cruz M.D. Anesth 1208 1256          Overtime Reason:  no overtime (within assigned shift)    Comments:

## 2024-04-03 NOTE — OR NURSING
1310 handoff from Elizabeth DELANEY   Patient denies nausea or pain at this time, pt tolerating water     1346 pt up to bathroom pt able to void. Pt changed into personal clothing     1357  Discharge instructions reviewed with family member. All questions answered, verbalizes understanding.     1406: IV and ID bands removed. Pt then escorted to car via wheelchair, accompanied by tech. All personal belongings & discharge instructions with patient.

## 2024-04-03 NOTE — OP REPORT
PreOp Diagnosis: 1. Thickened endometrial lining 2. PMB      PostOp Diagnosis: same       Procedure(s):  HYSTEROSCOPY, DILATION AND CURETTAGE - Wound Class: Clean Contaminated  DILATION AND CURETTAGE - Wound Class: Clean Contaminated    Surgeon(s):  Crista Wallace D.O.    Anesthesiologist/Type of Anesthesia:  Anesthesiologist: Patrice Cruz M.D./General    Surgical Staff:  Circulator: Burt Houston R.N.  Scrub Person: Jada Taylor    Specimens removed if any:  ID Type Source Tests Collected by Time Destination   A : ENDOMETRIAL POLYP Other Other PATHOLOGY SPECIMEN Crista Wallace D.O. 4/3/2024 1245        Estimated Blood Loss: minimal    Findings: NEFG, grade 3 baden walker cystocele, uterus anteverted and sounded to 6 cm. Endometrial lining pink tan. Fluffy appearing polypoid structure in the middle of the fundus. Bilateral ostia visualized.    Complications: none    Procedure:  The patient was taken the operating room where general anesthesia was obtained without any difficulty.  She was prepped and draped in the normal sterile fashion in the dorsal lithotomy position.  A weighted speculum was placed in the patient's vagina and the anterior lip of the cervix was grasped with single-tooth tenaculum.  The cervix was gently dilated with Hanks dilators and sounded to 6 cm.  The hysteroscope was then primed according to protocol and gently introduced into the uterine fundus with the findings as noted above.  Pictures were taken.  The myosure light was then interoduced through the hysteroscope after successful priming and it was run for approximately 5 minutes intermittently while removing the polypoid structure under direct visualization. Endometrial curettings were handed off to be sent to pathology.  The single-tooth tenaculum was then removed from the anterior lip of the cervix and excellent hemostasis was appreciated.  All instruments were then removed from the vagina.  The patient tolerated  the procedure well.  Sponge and lap counts were correct.  The patient was taken to recovery room awake and in stable condition.

## 2024-04-03 NOTE — ANESTHESIA PROCEDURE NOTES
Airway    Date/Time: 4/3/2024 12:19 PM    Performed by: Patrice Cruz M.D.  Authorized by: Patrice Cruz M.D.    Location:  OR  Urgency:  Elective  Indications for Airway Management:  Anesthesia      Spontaneous Ventilation: absent    Sedation Level:  Deep  Preoxygenated: Yes    Final Airway Type:  Supraglottic airway  Final Supraglottic Airway:  Standard LMA    SGA Size:  4  Number of Attempts at Approach:  1         Cephalic

## 2024-04-03 NOTE — OR NURSING
1254: Pt arrived from OR to PACU 1. Connected to monitor. Report received from anesthesia & RN. VSS. Oxygen at 8L via mask. Breaths calm, even, and unlabored.  No signs of pain. Clean bebe pad in place.     1305: Pt tolerating sips of water.     1306: Spouse updated on patient status in recovery.     1309: Hand off to RHETT Lopez.

## 2024-04-03 NOTE — OR SURGEON
Immediate Post OP Note    PreOp Diagnosis: 1. Thickened endometrial lining 2. PMB      PostOp Diagnosis: same       Procedure(s):  HYSTEROSCOPY, DILATION AND CURETTAGE - Wound Class: Clean Contaminated  DILATION AND CURETTAGE - Wound Class: Clean Contaminated    Surgeon(s):  Crista Wallace D.O.    Anesthesiologist/Type of Anesthesia:  Anesthesiologist: Patrice Cruz M.D./General    Surgical Staff:  Circulator: Burt Houston R.N.  Scrub Person: Jada Taylor    Specimens removed if any:  ID Type Source Tests Collected by Time Destination   A : ENDOMETRIAL POLYP Other Other PATHOLOGY SPECIMEN Crista Wallace D.O. 4/3/2024 1245        Estimated Blood Loss: minimal    Findings: NEFG, grade 3 baden walker cystocele, uterus anteverted and sounded to 6 cm. Endometrial lining pink tan. Fluffy appearing polypoid structure in the middle of the fundus. Bilateral ostia visualized.    Complications: none        4/3/2024 12:56 PM Crista Wallace D.O.

## 2024-04-03 NOTE — ANESTHESIA POSTPROCEDURE EVALUATION
Patient: Mari Kinsey    Procedure Summary       Date: 04/03/24 Room / Location: [facility] ROOM 27 / SURGERY [facility]    Anesthesia Start: 1208 Anesthesia Stop: 1256    Procedures:       HYSTEROSCOPY, DILATION AND CURETTAGE (Uterus)      DILATION AND CURETTAGE (Uterus) Diagnosis: (POSTMENOPAUSAL BLEEDING)    Surgeons: Crista Wallace D.O. Responsible Provider: Patrice Cruz M.D.    Anesthesia Type: general ASA Status: 3            Final Anesthesia Type: general  Last vitals  BP   Blood Pressure : (!) 144/66    Temp   36.4 °C (97.5 °F)    Pulse   62   Resp   18    SpO2   98 %      Anesthesia Post Evaluation    Patient participation: complete - patient participated  Level of consciousness: awake and alert  Pain score: 1    Airway patency: patent  Anesthetic complications: no  Cardiovascular status: adequate and hemodynamically stable  Respiratory status: acceptable  Hydration status: acceptable    PONV: none          There were no known notable events for this encounter.     Nurse Pain Score: 0 (NPRS)

## 2024-04-03 NOTE — DISCHARGE INSTRUCTIONS
See Handout for additional instructions     If any questions arise, call your provider.  If your provider is not available, please feel free to call the Surgical Center at [phone].    MEDICATIONS: Resume taking daily medication.  Take prescribed pain medication with food.  If no medication is prescribed, you may take non-aspirin pain medication if needed.  PAIN MEDICATION CAN BE VERY CONSTIPATING.  Take a stool softener or laxative such as senokot, pericolace, or milk of magnesia if needed.    What to Expect Post Anesthesia    Rest and take it easy for the first 24 hours.  A responsible adult is recommended to remain with you during that time.  It is normal to feel sleepy.  We encourage you to not do anything that requires balance, judgment or coordination.    FOR 24 HOURS DO NOT:  Drive, operate machinery or run household appliances.  Drink beer or alcoholic beverages.  Make important decisions or sign legal documents.    To avoid nausea, slowly advance diet as tolerated, avoiding spicy or greasy foods for the first day.  Add more substantial food to your diet according to your provider's instructions.  Babies can be fed formula or breast milk as soon as they are hungry.  INCREASE FLUIDS AND FIBER TO AVOID CONSTIPATION.    MILD FLU-LIKE SYMPTOMS ARE NORMAL.  YOU MAY EXPERIENCE GENERALIZED MUSCLE ACHES, THROAT IRRITATION, HEADACHE AND/OR SOME NAUSEA.

## 2024-04-10 ENCOUNTER — HOSPITAL ENCOUNTER (OUTPATIENT)
Dept: LAB | Facility: MEDICAL CENTER | Age: 69
End: 2024-04-10
Attending: INTERNAL MEDICINE
Payer: MEDICARE

## 2024-04-10 ENCOUNTER — TELEPHONE (OUTPATIENT)
Dept: MEDICAL GROUP | Facility: MEDICAL CENTER | Age: 69
End: 2024-04-10
Payer: MEDICARE

## 2024-04-10 DIAGNOSIS — E03.9 HYPOTHYROIDISM, UNSPECIFIED TYPE: ICD-10-CM

## 2024-04-10 LAB — TSH SERPL DL<=0.005 MIU/L-ACNC: 0.06 UIU/ML (ref 0.38–5.33)

## 2024-04-10 PROCEDURE — 36415 COLL VENOUS BLD VENIPUNCTURE: CPT

## 2024-04-10 PROCEDURE — 84443 ASSAY THYROID STIM HORMONE: CPT

## 2024-04-11 ENCOUNTER — TELEPHONE (OUTPATIENT)
Dept: MEDICAL GROUP | Facility: MEDICAL CENTER | Age: 69
End: 2024-04-11
Payer: MEDICARE

## 2024-04-11 NOTE — TELEPHONE ENCOUNTER
Pt notified, she is taking 6 days a week. I have advised her to  the new Rx at her pharmacy and remind herself to redo her labs in 6 - 8 wks.

## 2024-04-11 NOTE — TELEPHONE ENCOUNTER
Called the patient and left a message, please notify her that her thyroid blood test shows that her thyroid is over replaced.    Could you please verify that she is taking the thyroid pill 6 days a week?  If so we need to change her dose, please let me know and I will need to call in a new prescription to her pharmacy and we will need to repeat her blood test 6 to 8 weeks after that.

## 2024-04-11 NOTE — TELEPHONE ENCOUNTER
----- Message from Kenneth Reinoso M.D. sent at 4/10/2024 11:45 PM PDT -----  Called the patient and left a message, please notify her that her thyroid blood test shows that her thyroid is over replaced.    Could you please verify that she is taking the thyroid pill 6 days a week?  If so we need to change her dose, please let me know and I will need to call in a new prescription to her pharmacy and we will need to repeat her blood test 6 to 8 weeks after that.

## 2024-04-12 DIAGNOSIS — E03.8 OTHER SPECIFIED HYPOTHYROIDISM: Chronic | ICD-10-CM

## 2024-04-12 RX ORDER — LEVOTHYROXINE SODIUM 0.12 MG/1
125 TABLET ORAL
Qty: 90 TABLET | Refills: 0 | Status: SHIPPED | OUTPATIENT
Start: 2024-04-12

## 2024-04-12 NOTE — TELEPHONE ENCOUNTER
Thank you for the notification, I will send a new thyroid prescription to her pharmacy, repeat orders for the nonfasting labs and 6 to 8 weeks submitted in the computer.

## 2024-04-15 ENCOUNTER — GYNECOLOGY VISIT (OUTPATIENT)
Dept: OBGYN | Facility: CLINIC | Age: 69
End: 2024-04-15
Payer: MEDICARE

## 2024-04-15 VITALS — WEIGHT: 197 LBS | BODY MASS INDEX: 34.9 KG/M2 | DIASTOLIC BLOOD PRESSURE: 72 MMHG | SYSTOLIC BLOOD PRESSURE: 137 MMHG

## 2024-04-15 DIAGNOSIS — Z98.890 STATUS POST SURGERY: ICD-10-CM

## 2024-04-15 PROCEDURE — 3075F SYST BP GE 130 - 139MM HG: CPT | Performed by: OBSTETRICS & GYNECOLOGY

## 2024-04-15 PROCEDURE — 3078F DIAST BP <80 MM HG: CPT | Performed by: OBSTETRICS & GYNECOLOGY

## 2024-04-15 PROCEDURE — 99212 OFFICE O/P EST SF 10 MIN: CPT | Performed by: OBSTETRICS & GYNECOLOGY

## 2024-04-15 ASSESSMENT — FIBROSIS 4 INDEX: FIB4 SCORE: 0.94

## 2024-04-15 NOTE — PROGRESS NOTES
SUBJECTIVE:  Mari Kinsey presents to the clinic 2 weeks following   Hysteroscopy dilation and curettage with polypectomy.  Pathology was normal.  Eating a regular diet without difficulty. Bowel movement are Normal.  The patient is not having any pain. No VB. Patient Denies Incisional pain, drainage or redness    OBJECTIVE:  /72 (BP Location: Right arm, Patient Position: Sitting, BP Cuff Size: Large adult)   Wt 197 lb   BMI 34.90 kg/m²   Current Outpatient Medications on File Prior to Visit   Medication Sig Dispense Refill    levothyroxine (SYNTHROID) 125 MCG Tab Take 1 Tablet by mouth every morning on an empty stomach. Indications: low thyroid 90 Tablet 0    Multiple Vitamins-Minerals (CENTRUM MINIS WOMEN 50+ PO) Take  by mouth every day.      Multiple Vitamins-Minerals (PRESERVISION AREDS 2 PO) Take  by mouth.      vitamin D (CHOLECALCIFEROL) 1000 UNIT Tab Take 2,000 Units by mouth every day.       No current facility-administered medications on file prior to visit.       Constitutional:  alert, healthy, no distress.  Abdomen:  soft, bowel sounds active, non-tender.      IMPRESSION: Doing well postoperatively.  Pt is to increase activities as tolerated.    Lab:   Recent Results (from the past 1008 hour(s))   Histology Request    Collection Time: 03/07/24  3:00 PM   Result Value Ref Range    Pathology Request Sent to Histo    Histology Request    Collection Time: 04/03/24 12:45 PM   Result Value Ref Range    Pathology Request Sent to Histo    TSH    Collection Time: 04/10/24 10:40 AM   Result Value Ref Range    TSH 0.060 (L) 0.380 - 5.330 uIU/mL       PLAN:  Continue any current medications.  (See Med List for details.)  Return to clinic  : prn if symptoms worsen or fail to improve.

## 2024-04-17 NOTE — ASSESSMENT & PLAN NOTE
"Chronic, stable. Diagnosis made following incidental finding of disease on imaging. Associated with HLD. Encouraged Mediterranean diet and regular physical exercise. Follow up with PCP at least annually for continued monitoring.     CT 6/4/2021:  \"Abdominal vasculature: Mild aortic calcification.\"     CT 5/26/2015  \"Mild calcified plaque aorta.\"  "

## 2024-04-17 NOTE — ASSESSMENT & PLAN NOTE
Chronic, stable. Most recent lipid panel from 2/2024 with LDL at 123, Trigs 170. 2022 CT Calcium scoring 0. Pt is working to improve cholesterol with diet/exercise/weight loss. She is implementing a more plant-based, Mediterranean diet. Follow up with PCP at least annually for continued monitoring and management.   Lab Results   Component Value Date/Time    CHOLSTRLTOT 212 (H) 02/13/2024 08:13 AM     (H) 02/13/2024 08:13 AM    HDL 55 02/13/2024 08:13 AM    TRIGLYCERIDE 170 (H) 02/13/2024 08:13 AM

## 2024-04-17 NOTE — ASSESSMENT & PLAN NOTE
Chronic ongoing. Noted on 2017 echocardiogram with RVSP measuring 35mmHg. Pt denies dypnea. She maintains on nightly CPAP for ALLEN. Follow up with PCP per routine.

## 2024-04-17 NOTE — ASSESSMENT & PLAN NOTE
Chronic, stable. Pt maintains on levothyroxine 125mcg daily as she undergoes changes of her levo to normalize TSH after being overtreated. Continue current treatment regime. Follow up with PCP at least annually for continued monitoring and management.   Latest Reference Range & Units 04/10/24 10:40   TSH 0.380 - 5.330 uIU/mL 0.060 (L)   (L): Data is abnormally low

## 2024-04-18 ENCOUNTER — APPOINTMENT (OUTPATIENT)
Dept: FAMILY PLANNING/WOMEN'S HEALTH CLINIC | Facility: PHYSICIAN GROUP | Age: 69
End: 2024-04-18
Attending: FAMILY MEDICINE
Payer: MEDICARE

## 2024-04-18 VITALS
HEIGHT: 63 IN | BODY MASS INDEX: 34.73 KG/M2 | DIASTOLIC BLOOD PRESSURE: 60 MMHG | WEIGHT: 196 LBS | SYSTOLIC BLOOD PRESSURE: 122 MMHG

## 2024-04-18 DIAGNOSIS — E78.5 DYSLIPIDEMIA: Chronic | ICD-10-CM

## 2024-04-18 DIAGNOSIS — G47.33 OBSTRUCTIVE SLEEP APNEA SYNDROME: ICD-10-CM

## 2024-04-18 DIAGNOSIS — E03.8 OTHER SPECIFIED HYPOTHYROIDISM: Chronic | ICD-10-CM

## 2024-04-18 DIAGNOSIS — I70.0 ATHEROSCLEROSIS OF AORTA (HCC): ICD-10-CM

## 2024-04-18 DIAGNOSIS — I27.20 PULMONARY HYPERTENSION (HCC): ICD-10-CM

## 2024-04-18 DIAGNOSIS — C81.96 HODGKIN LYMPHOMA OF INTRAPELVIC LYMPH NODES, UNSPECIFIED HODGKIN LYMPHOMA TYPE (HCC): ICD-10-CM

## 2024-04-18 DIAGNOSIS — E66.9 OBESITY (BMI 30.0-34.9): ICD-10-CM

## 2024-04-18 PROCEDURE — G0439 PPPS, SUBSEQ VISIT: HCPCS | Performed by: PHYSICIAN ASSISTANT

## 2024-04-18 PROCEDURE — 3074F SYST BP LT 130 MM HG: CPT | Performed by: PHYSICIAN ASSISTANT

## 2024-04-18 PROCEDURE — 1126F AMNT PAIN NOTED NONE PRSNT: CPT | Performed by: PHYSICIAN ASSISTANT

## 2024-04-18 PROCEDURE — 3078F DIAST BP <80 MM HG: CPT | Performed by: PHYSICIAN ASSISTANT

## 2024-04-18 SDOH — ECONOMIC STABILITY: FOOD INSECURITY: WITHIN THE PAST 12 MONTHS, YOU WORRIED THAT YOUR FOOD WOULD RUN OUT BEFORE YOU GOT MONEY TO BUY MORE.: NEVER TRUE

## 2024-04-18 SDOH — ECONOMIC STABILITY: FOOD INSECURITY: WITHIN THE PAST 12 MONTHS, THE FOOD YOU BOUGHT JUST DIDN'T LAST AND YOU DIDN'T HAVE MONEY TO GET MORE.: NEVER TRUE

## 2024-04-18 SDOH — ECONOMIC STABILITY: INCOME INSECURITY: HOW HARD IS IT FOR YOU TO PAY FOR THE VERY BASICS LIKE FOOD, HOUSING, MEDICAL CARE, AND HEATING?: NOT HARD AT ALL

## 2024-04-18 ASSESSMENT — PAIN SCALES - GENERAL: PAINLEVEL: NO PAIN

## 2024-04-18 ASSESSMENT — ENCOUNTER SYMPTOMS: GENERAL WELL-BEING: GOOD

## 2024-04-18 ASSESSMENT — PATIENT HEALTH QUESTIONNAIRE - PHQ9: CLINICAL INTERPRETATION OF PHQ2 SCORE: 0

## 2024-04-18 ASSESSMENT — FIBROSIS 4 INDEX: FIB4 SCORE: 0.94

## 2024-04-18 ASSESSMENT — ACTIVITIES OF DAILY LIVING (ADL): BATHING_REQUIRES_ASSISTANCE: 0

## 2024-04-18 NOTE — PROGRESS NOTES
Comprehensive Health Assessment Program     Mari Kinsey is a 68 y.o. here for her comprehensive health assessment.    Patient Active Problem List    Diagnosis Date Noted    History of COVID-19 01/18/2024    Sensorineural hearing loss 01/13/2024    Atherosclerosis of aorta (HCC) 04/17/2023    Pulmonary hypertension (HCC) 04/17/2023    Impaired glucose metabolism 12/16/2022    Cataract 07/05/2022    History of hypertension 07/09/2020    Allergic rhinitis 03/09/2020    Hodgkin lymphoma (HCC) 02/11/2016    Microscopic hematuria 10/16/2015    Skin lesion 12/19/2014    Family history of breast cancer in sister 12/19/2014    Macular drusen 12/19/2014    Obesity (BMI 30.0-34.9) 12/19/2014    Sleep apnea 09/24/2010    Preventative health care 09/17/2010    S/p bunion surgery left foot 09/17/2010    Deviated nasal septum 09/17/2010    Dyslipidemia 09/17/2010    Hypothyroid 09/16/2010       Current Outpatient Medications   Medication Sig Dispense Refill    levothyroxine (SYNTHROID) 125 MCG Tab Take 1 Tablet by mouth every morning on an empty stomach. Indications: low thyroid 90 Tablet 0    Multiple Vitamins-Minerals (CENTRUM MINIS WOMEN 50+ PO) Take  by mouth every day.      Multiple Vitamins-Minerals (PRESERVISION AREDS 2 PO) Take  by mouth.      vitamin D (CHOLECALCIFEROL) 1000 UNIT Tab Take 2,000 Units by mouth every day.       No current facility-administered medications for this visit.          Current supplements as per medication list.     Allergies:   Penicillins  Social History     Tobacco Use    Smoking status: Never     Passive exposure: Yes    Smokeless tobacco: Never    Tobacco comments:     parents smoke when she was a child   Vaping Use    Vaping Use: Never used   Substance Use Topics    Alcohol use: No     Alcohol/week: 0.0 oz    Drug use: No     Family History   Problem Relation Age of Onset    Arthritis Mother         osteoporosis    Heart Disease Father     No Known Problems Sister     No Known  Problems Sister     No Known Problems Sister     No Known Problems Brother     No Known Problems Brother     No Known Problems Brother     Sleep Apnea Neg Hx      Mari  has a past medical history of Chickenpox, Dental disorder, Urdu measles, Hypothyroidism, Influenza, Mumps, ALLEN (obstructive sleep apnea), Sleep apnea, Snoring, Thyroid disease, and Tonsillitis.   Past Surgical History:   Procedure Laterality Date    HYSTEROSCOPY WITH MYOSURE N/A 4/3/2024    Procedure: HYSTEROSCOPY, DILATION AND CURETTAGE;  Surgeon: Crista Wallace D.O.;  Location: SURGERY SAME DAY [facility];  Service: Gynecology    DILATION AND CURETTAGE N/A 4/3/2024    Procedure: DILATION AND CURETTAGE;  Surgeon: Crista Wallace D.O.;  Location: SURGERY SAME DAY [facility];  Service: Gynecology    KALE BY LAPAROSCOPY  2/3/2016    Procedure: KALE BY LAPAROSCOPY;  Surgeon: Tricia Farah M.D.;  Location: SURGERY SAME DAY [facility];  Service:     LYMPH NODE SAMPLING  2/3/2016    Procedure: LYMPH NODE SAMPLING EXCISION, BIOPSY;  Surgeon: Tricia Farah M.D.;  Location: SURGERY SAME DAY [facility];  Service:     BONE GRAFT      at 19 years of age    BUNIONECTOMY      HYSTEROSCOPY WITH VIDEO DIAGNOSTIC      NASAL SEPTAL RECONSTRUCTION      OTHER      bone graft to ;hand    TONSILLECTOMY         Screening:  In the last six months have you experienced any leakage of urine? No    Depression Screening  Little interest or pleasure in doing things?  0 - not at all  Feeling down, depressed , or hopeless? 0 - not at all  Patient Health Questionnaire Score: 0     If depressive symptoms identified deferred to follow up visit unless specifically addressed in assessment and plan.    Interpretation of PHQ-9 Total Score   Score Severity   1-4 No Depression   5-9 Mild Depression   10-14 Moderate Depression   15-19 Moderately Severe Depression   20-27 Severe Depression    Screening for Cognitive Impairment  Do you or any of your friends or family members  have any concern about your memory? No  Three Minute Recall (Leader, Season, Table) 3/3    Nakul clock face with all 12 numbers and set the hands to show 10 minutes after 11.  Yes 5  Cognitive concerns identified deferred for follow up unless specifically addressed in assessment and plan.    Fall Risk Assessment  Has the patient had two or more falls in the last year or any fall with injury in the last year?  No    Safety Assessment  Do you always wear your seatbelt?  Yes  Any changes to home needed to function safely? No  Difficulty hearing.  No  Patient counseled about all safety risks that were identified.    Functional Assessment ADLs  Are there any barriers preventing you from cooking for yourself or meeting nutritional needs?  No.    Are there any barriers preventing you from driving safely or obtaining transportation?  No.    Are there any barriers preventing you from using a telephone or calling for help?  No    Are there any barriers preventing you from shopping?  No.    Are there any barriers preventing you from taking care of your own finances?  No    Are there any barriers preventing you from managing your medications?  No    Are there any barriers preventing you from showering, bathing or dressing yourself? No    Are there any barriers preventing you from doing housework or laundry? No  Are there any barriers preventing you from using the toilet?No  Are you currently engaging in any exercise or physical activity?  Yes. Walking everyday     Self-Assessment of Health  What is your perception of your health? Good  Do you sleep more than six hours a night? Yes  In the past 7 days, how much did pain keep you from doing your normal work? None  Do you spend quality time with family or friends (virtually or in person)? Yes  Do you usually eat a heart healthy diet that constists of a variety of fruits, vegetables, whole grains and fiber? Yes  Do you eat foods high in fat and/or Fast Food more than three times per  week? No    Advance Care Planning  Do you have an Advance Directive, Living Will, Durable Power of , or POLST? Yes      Durable Power of    is on file      Health Maintenance Summary            Annual Wellness Visit (Yearly) Next due on 4/18/2025 04/18/2024  Level of Service: DC ANNUAL WELLNESS VISIT-INCLUDES PPPS SUBSEQUE*    04/17/2023  Level of Service: DC ANNUAL WELLNESS VISIT-INCLUDES PPPS SUBSEQUE*    11/14/2022  Visit Dx: Medicare annual wellness visit, subsequent    05/27/2022  Level of Service: DC ANNUAL WELLNESS VISIT-INCLUDES PPPS SUBSEQUE*    11/08/2021  Visit Dx: Medicare annual wellness visit, subsequent    Only the first 5 history entries have been loaded, but more history exists.              Mammogram (Every 2 Years) Next due on 8/14/2025 08/14/2023  MA-SCREENING MAMMO BILAT W/TOMOSYNTHESIS W/CAD    02/01/2022  MA-SCREENING MAMMO BILAT W/TOMOSYNTHESIS W/CAD    03/13/2019  [id number]    03/13/2019  MA-SCREENING MAMMO BILAT W/TOMOSYNTHESIS W/CAD    11/25/2016  [id number]    Only the first 5 history entries have been loaded, but more history exists.              Bone Density Scan (Every 5 Years) Next due on 2/1/2027 02/01/2022  DS-BONE DENSITY STUDY (DEXA)    12/29/2016  DS-BONE DENSITY STUDY (DEXA)              Colorectal Cancer Screening (Colonoscopy - Preferred) Next due on 3/22/2031      03/22/2021  REFERRAL TO GI FOR COLONOSCOPY    01/28/2020  OCCULT BLOOD FECES IMMUNOASSAY    01/14/2019  OCCULT BLOOD FECES IMMUNOASSAY    11/17/2010  AMB REFERRAL TO GI FOR COLONOSCOPY              IMM DTaP/Tdap/Td Vaccine (3 - Td or Tdap) Next due on 3/14/2032      03/14/2022  Imm Admin: Tdap Vaccine    12/09/2011  Imm Admin: Tdap Vaccine              Hepatitis B Vaccine (Hep B) (Series Information) Completed      08/04/2017  Imm Admin: Hepatitis B Vaccine (Adol/Adult)    02/03/2017  Imm Admin: Hepatitis B Vaccine (Adol/Adult)    12/22/2016  Imm Admin:  Hepatitis B Vaccine (Adol/Adult)              Zoster (Shingles) Vaccines (Series Information) Completed      11/26/2019  Imm Admin: Zoster Vaccine Recombinant (RZV) (SHINGRIX)    09/10/2019  Imm Admin: Zoster Vaccine Recombinant (RZV) (SHINGRIX)    09/02/2014  Imm Admin: Zoster Vaccine Live (ZVL) (Zostavax) - HISTORICAL DATA              Pneumococcal Vaccine: 65+ Years (Series Information) Completed      11/14/2022  Imm Admin: Pneumococcal Conjugate Vaccine (PCV20)    11/08/2021  Imm Admin: Pneumococcal Conjugate Vaccine (Prevnar/PCV-13)    12/22/2016  Imm Admin: Pneumococcal polysaccharide vaccine (PPSV-23)              Influenza Vaccine (Series Information) Completed      09/05/2023  Imm Admin: Influenza Vaccine Adult HD    09/06/2022  Imm Admin: Influenza Vaccine Adult HD    10/18/2021  Imm Admin: Influenza Vaccine Quad Inj (Pf)    09/27/2020  Imm Admin: Influenza Vaccine Quad Inj (Pf)    09/24/2020  Imm Admin: Influenza Vaccine Quad Inj (Pf)    Only the first 5 history entries have been loaded, but more history exists.              COVID-19 Vaccine (Series Information) Completed      09/25/2023  Imm Admin: Comirnaty (Covid-19 Vaccine, Mrna, [id number] Formula)    09/28/2022  Imm Admin: MODERNA BIVALENT BOOSTER SARS-COV-2 VACCINE (6+)    04/21/2022  Imm Admin: MODERNA SARS-COV-2 VACCINE (12+)    10/26/2021  Imm Admin: MODERNA SARS-COV-2 VACCINE (12+)    05/05/2021  Imm Admin: MODERNA SARS-COV-2 VACCINE (12+)    Only the first 5 history entries have been loaded, but more history exists.              Hepatitis C Screening  Completed      01/17/2024  Done    12/23/2015  Hepatitis C Antibody component of HEPATITIS PANEL ACUTE(4 COMPONENTS)              Hepatitis A Vaccine (Hep A) (Series Information) Aged Out      No completion history exists for this topic.              HPV Vaccines (Series Information) Aged Out      No completion history exists for this topic.              Polio Vaccine (Inactivated Polio) (Series  "Information) Aged Out      No completion history exists for this topic.              Meningococcal Immunization (Series Information) Aged Out      No completion history exists for this topic.              Discontinued - Cervical Cancer Screening  Discontinued        Frequency changed to Never automatically (Topic No Longer Applies)    02/23/2017  THINPREP PAP WITH HPV    02/23/2017  PATHOLOGY GYN SPECIMEN    02/23/2017  Done -     02/23/2017  Done    Only the first 5 history entries have been loaded, but more history exists.                    Patient Care Team:  Kenneth Reinoso M.D. as PCP - General  Kennethmignon Reinoso M.D. as PCP - [facility] Paneled  Marguerite Barajas R.N. (Inactive) as   [facility] as Respiratory Therapist (DME Supplier)      Financial Resource Strain: Low Risk  (4/18/2024)    Overall Financial Resource Strain (CARDIA)     Difficulty of Paying Living Expenses: Not hard at all      Transportation Needs: No Transportation Needs (4/18/2024)    PRAPARE - Transportation     Lack of Transportation (Medical): No     Lack of Transportation (Non-Medical): No      Food Insecurity: No Food Insecurity (4/18/2024)    Hunger Vital Sign     Worried About Running Out of Food in the Last Year: Never true     Ran Out of Food in the Last Year: Never true        Encounter Vitals  Blood Pressure : 122/60  Weight: 88.9 kg (196 lb)  Height: 160 cm (5' 3\")  BMI (Calculated): 34.72  Pain Score: No pain     Alert, oriented in no acute distress.  Eye contact is good, speech goal directed, affect calm.    Assessment and Plan. The following treatment and monitoring plan is recommended:  Dyslipidemia  Chronic, stable. Most recent lipid panel from 2/2024 with LDL at 123, Trigs 170. 2022 CT Calcium scoring 0. Pt is working to improve cholesterol with diet/exercise/weight loss. She is implementing a more plant-based, Mediterranean diet. Follow up with PCP at least annually for continued monitoring and " "management.   Lab Results   Component Value Date/Time    CHOLSTRLTOT 212 (H) 02/13/2024 08:13 AM     (H) 02/13/2024 08:13 AM    HDL 55 02/13/2024 08:13 AM    TRIGLYCERIDE 170 (H) 02/13/2024 08:13 AM       Atherosclerosis of aorta (HCC)  Chronic, stable. Diagnosis made following incidental finding of disease on imaging. Associated with HLD. Encouraged Mediterranean diet and regular physical exercise. Follow up with PCP at least annually for continued monitoring.     CT 6/4/2021:  \"Abdominal vasculature: Mild aortic calcification.\"     CT 5/26/2015  \"Mild calcified plaque aorta.\"    Hypothyroid  Chronic, stable. Pt maintains on levothyroxine 125mcg daily as she undergoes changes of her levo to normalize TSH after being overtreated. Continue current treatment regime. Follow up with PCP at least annually for continued monitoring and management.   Latest Reference Range & Units 04/10/24 10:40   TSH 0.380 - 5.330 uIU/mL 0.060 (L)   (L): Data is abnormally low    Pulmonary hypertension (HCC)  Chronic ongoing. Noted on 2017 echocardiogram with RVSP measuring 35mmHg. Pt denies dypnea. She maintains on nightly CPAP for ALLEN. Follow up with PCP per routine.    Sleep apnea  Chronic, stable. Pt is compliant on nightly CPAP use. Follow up with sleep medicine per routine.    Hodgkin lymphoma (HCC)  Chronic, ongoing. Pt is presenting undergoing watchful waiting given asymptomatic status. She is followed by Dr. Blanchard every six months. Follow up with oncology per routine    Obesity (BMI 30.0-34.9)  Chronic, ongoing. BMI today 34.72. Associated with ALLEN, HLD. Continue to encourage healthy, low calorie diet with regular physical activity with goal of weight loss. Follow up with PCP at least annually for continued monitoring and management.    Services suggested: No services needed at this time  Health Care Screening: Age-appropriate preventive services recommended by USPTF and ACIP covered by Medicare were discussed today. " Services ordered if indicated and agreed upon by the patient.  Referrals offered: Community-based lifestyle interventions to reduce health risks and promote self-management and wellness, fall prevention, nutrition, physical activity, tobacco-use cessation, weight loss, and mental health services as per orders if indicated.    Discussion today about general wellness and lifestyle habits:    Prevent falls and reduce trip hazards; Cautioned about securing or removing rugs.  Have a working fire alarm and carbon monoxide detector.  Engage in regular physical activity and social activities.    Follow-up: Return for follow up visit with PCP as previously scheduled.

## 2024-04-18 NOTE — ASSESSMENT & PLAN NOTE
Chronic, ongoing. Pt is presenting undergoing watchful waiting given asymptomatic status. She is followed by Dr. Blanchard every six months. Follow up with oncology per routine

## 2024-04-18 NOTE — ASSESSMENT & PLAN NOTE
Chronic, ongoing. BMI today 34.72. Associated with ALLEN, HLD. Continue to encourage healthy, low calorie diet with regular physical activity with goal of weight loss. Follow up with PCP at least annually for continued monitoring and management.

## 2024-05-01 NOTE — PROGRESS NOTES
Urogynecology & Reconstructive Pelvic Surgery - Consultation Visit    Mari Kinsey MRN:[id number] :1955    Referred by: Crista Wallace DO    Reason for Visit:   Chief Complaint   Patient presents with    New Patient     Consult          Subjective     History of Presenting Illness:    Mari Kinsey is a 68 y.o. P1 who presents for the evaluation and management of prolapse.     She was told she had mild prolapse many years ago, and slowly started to become bothersome.  Is now protruding further outside the vagina, noted is a grade 3 cystocele by Dr. Wallace.  She has tried a pessary before but this was uncomfortable and fell out    She is recently s/p hysteroscopy/polypectomy (benign).    Prior Pelvic surgery:    hysteroscopic myomectomy   hysteroscopy (benign)     Prior treatment:   Vesy/Pfilates (Dr Perez.   Pessary - very uncomfortable     Pelvic floor symptom review:     Bladder:   Voids per day: 6 Voids per night: 1      Urinary incontinence episodes per day: none    Bladder emptying: Complete   Voiding symptoms: None   UTI in last 12 months: no   Other urologic history: no      Prolapse:     Prolapse symptoms: Bulge and Exteriorized Tissue   Degree of prolapse: Beyond Introitus   Duration of prolapse symptoms: years      Bowel:    Hemorrhoids      Sexual function:    Sexually active: No  - partner issues.    Gender of partners: Male   Pain with intercourse: No       Pelvic Pain: no      Past medical and surgical history    Past obstetric history   Number of vaginal deliveries: 1   Number of  deliveries: 0   History of vacuum/forceps: No    History of obstetric anal sphincter injury: No     Past gynecological history:    Last menstrual period: No LMP recorded. Patient is postmenopausal.   History of abnormal uterine bleeding: s/p polypectomy (benign)   History of fibroids: No    History of endometrial polyps:  Yes   History of endometriosis: No    History of cervical  dysplasia: No       Past medical history:  Past Medical History:   Diagnosis Date    Cancer (HCC)     Chickenpox     Dental disorder     upper bridge    Urdu measles     Hypothyroidism     Influenza     Mumps     ALLEN (obstructive sleep apnea)     Sleep apnea     cpap    Snoring     sleep study done    Thyroid disease     Tonsillitis      Past surgical history:  Past Surgical History:   Procedure Laterality Date    HYSTEROSCOPY WITH MYOSURE N/A 4/3/2024    Procedure: HYSTEROSCOPY, DILATION AND CURETTAGE;  Surgeon: Crista Wallace D.O.;  Location: SURGERY SAME DAY [facility];  Service: Gynecology    DILATION AND CURETTAGE N/A 4/3/2024    Procedure: DILATION AND CURETTAGE;  Surgeon: Crista Wallace D.O.;  Location: SURGERY SAME DAY [facility];  Service: Gynecology    KALE BY LAPAROSCOPY  2/3/2016    Procedure: KALE BY LAPAROSCOPY;  Surgeon: Tricia Farah M.D.;  Location: SURGERY SAME DAY [facility];  Service:     LYMPH NODE SAMPLING  2/3/2016    Procedure: LYMPH NODE SAMPLING EXCISION, BIOPSY;  Surgeon: Tricia Farah M.D.;  Location: SURGERY SAME DAY [facility];  Service:     BONE GRAFT      at 19 years of age    BUNIONECTOMY      HYSTEROSCOPY WITH VIDEO DIAGNOSTIC      NASAL SEPTAL RECONSTRUCTION      OTHER      bone graft to ;hand    TONSILLECTOMY       Medications:has a current medication list which includes the following prescription(s): estradiol, levothyroxine, multiple vitamins-minerals, multiple vitamins-minerals, and vitamin d.  Allergies:Penicillins  Family history:  Family History   Problem Relation Age of Onset    Arthritis Mother         osteoporosis    Heart Disease Father     No Known Problems Sister     No Known Problems Sister     No Known Problems Sister     No Known Problems Brother     No Known Problems Brother     No Known Problems Brother     Sleep Apnea Neg Hx      Social history: reports that she has never smoked. She has been exposed to tobacco smoke. She has never used smokeless  "tobacco. She reports that she does not drink alcohol and does not use drugs.    Review of systems: A full review of systems was performed, and negative with the exception of want is noted above in the HPI.        Objective        /68 (BP Location: Right arm, Patient Position: Sitting, BP Cuff Size: Large adult)   Pulse 86   Ht 5' 2\"   Wt 199 lb   BMI 36.40 kg/m²     Physical Exam  Vitals reviewed. Exam conducted with a chaperone present (MA - see notes.).   Constitutional:       Appearance: Normal appearance.   HENT:      Head: Normocephalic.      Mouth/Throat:      Mouth: Mucous membranes are moist.   Cardiovascular:      Rate and Rhythm: Normal rate.   Pulmonary:      Effort: Pulmonary effort is normal.   Abdominal:      Palpations: Abdomen is soft. There is no mass.      Tenderness: There is no abdominal tenderness.   Skin:     General: Skin is warm and dry.   Neurological:      Mental Status: She is alert.   Psychiatric:         Mood and Affect: Mood normal.         Genitourinary:    Vulva: petechiae   Bulbocavernosus reflex: Intact   Anal wink reflex: Intact   Perineal sensation: WNL   Urethra: Hypermobile   Vagina: Atrophic   Atrophy: Mild   Cough stress test: Negative    Pelvic floor:    POP-Q: Aa +1.5 / Ba +1.5 / TVL 8 / C -1 / D -3 / Ap 0 / Bp 0 / GH 4 / PB 2.5   Prolapse stage: 3   Paravaginal defect: mild   Cervical elongation: No    Urethral tenderness: No    Bladder/ suprapubic tenderness: No    Levator tenderness: None   Levator muscle tone: Hypertonic   Pelvic floor contraction strength (modified Oxford scale): 2=Weak   Pelvic floor contraction duration: Brief    Bimanual exam: Small, Mobile Uterus     Procedure Performed: No    Diagnostic test and records review:    Post-void residual: 55 mL, performed by Bladder Scanner    Labs: n/a    Radiology:     Pelvic US 2/2024 (reviewed, scan prior to polypectomy)  The uterus measures 2.51 cm x 5.56 cm x 3.16 cm.  3 uterine masses which " statistically most likely represent uterine fibroids. There is an exophytic calcified mass at the right uterine body measuring 2.9 x 3.2 x 2.8 cm. There is an echogenic intramural mass at the anterior uterine fundus measuring 1.5 x   1.7 x 1.0 cm. Heterogeneous hypoechoic mass at the posterior uterine body/fundus measuring 1.5 x 1.4 x 1.5 cm.  The endometrial echo complex measures 0.59 cm.  The endometrium is thickened for patient age as there is postmenopausal bleeding. Cannot exclude endometrial hyperplasia or neoplasia.  OVARIES:  The right ovary measures 1.46 cm x 2.07 cm x 1.25 cm. Duplex Doppler examination of the right ovary shows normal waveforms.  The left ovary measures 1.74 cm x 1.12 cm x 0.85 cm. Duplex Doppler examination of the left ovary shows normal waveforms.  There is no free fluid seen.  IMPRESSION:  1.  Endometrial stripe thickness of 5.9 mm which is considered thickened in the setting of postmenopausal bleeding. Cannot exclude endometrial hyperplasia or neoplasia.  2.  3 small uterine masses which statistically most likely represent uterine fibroids.    Procedural: n/a    Documentation reviewed: Prior EMR Records         Assessment & Plan     Mari Kinsey is a 68 y.o. P1 with Medicare vaginal prolapse. We discussed my recommendations for further diagnosis and treatment at length today.     1. Incomplete uterovaginal prolapse  2. Cystocele, lateral  3. Rectocele  4. H/o AUB due to endometrial polyp, s/p polypectomy (benign)  She has symptomatic pelvic organ prolapse, anterior/uterine predominant.  In most cases, this is not a dangerous condition that necessitates treatment in all patients, but treatment is offered when it impacts quality of life, bladder function, or bowel function.  I reviewed the clinical findings and discussed the pathogenesis extensively, including genetic tendency, aging, menopause and childbirth injuries. Also discussed options for management, including both  nonsurgical and surgical options.   Nonsurgical options include both expectant management, pelvic floor physical therapy to prevent progression, and pessary use. I discussed different types of pessary as well as pessary care.  He has previously tried pessary and would prefer not to again  We reviewed all surgical options including both vaginal and laparoscopic reconstructive approaches, and those using native tissue (nonmesh) and mesh augmented approaches.  We discussed recurrent/retreatment risk for all surgical approaches.  Native tissue (nonmesh) approaches have a retreatment rate in excess of 20% long-term, and this is reduced with mesh augmentation (approximately 5-8%).  Recurrent forms of surgical mesh have been extensively evaluated for their safety, but there is a 1 to 5% risk long-term of mesh complications, the majority of which include mesh exposure, which was discussed with her.  We also discussed uterine sparing approaches and there is involving hysterectomy, although given her uterine descent and prior endometrial polyps, hysterectomy would likely have the benefits of prolapse repair as well as cancer prevention.  She also strongly desires cancer prevention from an ovarian standpoint, and would like to ensure these are removed at the time of surgery  Prefer to avoid permanent implanted mesh, and she opts for scheduling: Laparoscopic assisted vaginal hysterectomy, bilateral salpingo-oophorectomy, vault suspension, anterior/posterior repair, perineorrhaphy, possible incontinence procedure, and all indicated procedures.  Laparoscopic approach to ensure ovarian removal at time of vaginal hysterectomy  In addition to my verbal counseling today, detailed written counseling was provided that detailed the surgical procedures, preoperative information, risks of surgery, postoperative recovery arc.  She was instructed to review these in detail prior to her preoperative visit and to bring questions.  To try  pelvic floor physical therapy prior to and following surgery in order to minimize recurrence chances long-term. Referral sent  Simple cystometrogram in order to rule out occult stress urinary incontinence at her preoperative visit.  She has no other bladder function at this time to warrant urodynamics      5. Genitourinary syndrome of menopause  She has vaginal atrophy / genitorurinary syndrome of menopause. This is very common and due to low estrogen levels, which render the vaginal tissue thin, irritated, and open to colonization with gut clovis. This can lead to irritation, dryness, painful sex, urinary infections and urinary urgency and incontinence. Discussed risks, benefits, and indications for vaginal estrogen therapy.  Vaginal estrogen is considered a topical-only therapy that has negligible absorption into the bloodstream and is not associated with increased risks for uterine or breast cancers, stroke, blood clots. The effects of vaginal estrogen can take weeks to months.  Prescription given for:   - estradiol (ESTRACE VAGINAL) 0.1 MG/GM vaginal cream; Apply 1g cream inside vagina twice per week  Dispense: 1 Each; Refill: 6           Magdalene Sorto MD, FACOG  Urogynecology and Reconstructive Pelvic Surgery  Department of Obstetrics and Gynecology  [facility]        This medical record contains text that has been entered with the assistance of computer voice recognition and dictation software.  Therefore, it may contain unintended errors in text, spelling, punctuation, or grammar

## 2024-05-02 ENCOUNTER — GYNECOLOGY VISIT (OUTPATIENT)
Dept: GYNECOLOGY | Facility: CLINIC | Age: 69
End: 2024-05-02
Payer: MEDICARE

## 2024-05-02 VITALS
DIASTOLIC BLOOD PRESSURE: 68 MMHG | HEIGHT: 62 IN | HEART RATE: 86 BPM | WEIGHT: 199 LBS | BODY MASS INDEX: 36.62 KG/M2 | SYSTOLIC BLOOD PRESSURE: 123 MMHG

## 2024-05-02 DIAGNOSIS — N84.0 ABNORMAL UTERINE BLEEDING DUE TO ENDOMETRIAL POLYP: ICD-10-CM

## 2024-05-02 DIAGNOSIS — N81.2 INCOMPLETE UTEROVAGINAL PROLAPSE: Primary | ICD-10-CM

## 2024-05-02 DIAGNOSIS — N93.9 ABNORMAL UTERINE BLEEDING DUE TO ENDOMETRIAL POLYP: ICD-10-CM

## 2024-05-02 DIAGNOSIS — N81.12 CYSTOCELE, LATERAL: ICD-10-CM

## 2024-05-02 DIAGNOSIS — N95.8 GENITOURINARY SYNDROME OF MENOPAUSE: ICD-10-CM

## 2024-05-02 DIAGNOSIS — N81.6 RECTOCELE: ICD-10-CM

## 2024-05-02 LAB
APPEARANCE UR: CLEAR
BILIRUB UR STRIP-MCNC: NEGATIVE MG/DL
COLOR UR AUTO: YELLOW
GLUCOSE UR STRIP.AUTO-MCNC: NEGATIVE MG/DL
KETONES UR STRIP.AUTO-MCNC: NEGATIVE MG/DL
LEUKOCYTE ESTERASE UR QL STRIP.AUTO: NORMAL
NITRITE UR QL STRIP.AUTO: NEGATIVE
PH UR STRIP.AUTO: 6 [PH] (ref 5–8)
PROT UR QL STRIP: NEGATIVE MG/DL
RBC UR QL AUTO: NEGATIVE
SP GR UR STRIP.AUTO: 1.02
UROBILINOGEN UR STRIP-MCNC: NORMAL MG/DL

## 2024-05-02 PROCEDURE — 3078F DIAST BP <80 MM HG: CPT | Performed by: STUDENT IN AN ORGANIZED HEALTH CARE EDUCATION/TRAINING PROGRAM

## 2024-05-02 PROCEDURE — 99204 OFFICE O/P NEW MOD 45 MIN: CPT | Performed by: STUDENT IN AN ORGANIZED HEALTH CARE EDUCATION/TRAINING PROGRAM

## 2024-05-02 PROCEDURE — 81002 URINALYSIS NONAUTO W/O SCOPE: CPT | Performed by: STUDENT IN AN ORGANIZED HEALTH CARE EDUCATION/TRAINING PROGRAM

## 2024-05-02 PROCEDURE — 3074F SYST BP LT 130 MM HG: CPT | Performed by: STUDENT IN AN ORGANIZED HEALTH CARE EDUCATION/TRAINING PROGRAM

## 2024-05-02 PROCEDURE — 99459 PELVIC EXAMINATION: CPT | Performed by: STUDENT IN AN ORGANIZED HEALTH CARE EDUCATION/TRAINING PROGRAM

## 2024-05-02 RX ORDER — ESTRADIOL 0.1 MG/G
CREAM VAGINAL
Qty: 1 EACH | Refills: 6 | Status: SHIPPED | OUTPATIENT
Start: 2024-05-02

## 2024-05-02 ASSESSMENT — FIBROSIS 4 INDEX: FIB4 SCORE: 0.94

## 2024-05-02 NOTE — PATIENT INSTRUCTIONS
Pre-op: What you should know before your surgery  Vaginal reconstructive surgery for prolapse       You are scheduled for surgery to fix your prolapse (vaginal bulge) using sutures to suspend your own tissues back into a supported position. These surgeries are minimally invasive, meaning that all incisions are small and hidden inside the vagina. The documents in this packet will outline each part of the surgery. There may be a few “possible” surgeries listed. We use the word “possible” because we tailor the surgery based on your needs. This means we won't know how much surgery you'll need until after you receive anesthesia and fall asleep.     When you fall asleep, the pelvic muscles will relax, allowing us to determine how much surgery you need. We will do as few procedures as possible to fix your prolapse but will address each area as needed.     Goals of prolapse surgery: The primary goal of surgery is to repair the prolapse and eliminate the symptoms of a vaginal bulge and pressure. Depending on your symptoms, the surgery may possibly improve bladder and/or rectal emptying, as well as urinary incontinence.      Prolapse recurrence:  No permanent mesh material will be used to fix prolapse in this procedure. However, since we are relying on your own tissues to heal into place and correct the prolapse, there is a risk that your symptoms may return over time. The majority of patients are satisfied with their repair long-term and do not require any further surgery. However, after non-mesh vaginal surgery, prolapse can eventually return in up to half of women. About 10% of women requiring another treatment. Your personal risk of recurrence/retreatment is based on multiple factors including genetics, your body weight, smoking, frequent heavy lifting, and future vaginal deliveries.      Sexual function:  Following surgical repair, most patients experience improvements in their sexual function. Surgery tends to improve  improved the general discomfort of sex associated with prolapse. However, if you have a long history of pain with sex (either externally or internally), this is unlikely to be due to prolapse. Therefore, surgery may not improve these symptoms.     Surgery involves the cutting and re-sewing of the vaginal tissues . Scar tissue may form after surgery, which can lead to painful sex for some patients. In many patients, this new pain goes away over time or can be improved with pelvic physical therapy, lubrication, dilators, or vaginal estrogen.     Bladder urgency and incontinence after surgery:  Bladder tests may be performed before your surgery to see whether you are at risk for new or worsening urinary leakage after prolapse repair. Our bladder testing is a great tool to find out who is at risk for urinary leakage, but it is not perfect. There is a chance you may have new or increased leakage with coughing, sneezing, or laughing after surgery. Problems with leakage can be addressed in a number of ways. Bladder urgency and/or frequency often improves after surgery, but it may worsen in some patients. We have various medications and therapies that can help with this urgency after surgery, if necessary    Risk of postoperative pain: Pain after prolapse surgery is a normal part of the healing process, but usually well-tolerated. Common areas of pain include the pelvis, buttocks, hips and back, often related to positioning. Expect to have some pain when you are discharged home. This should improve with time and with use of medications. See “after surgery” instructions for more details on pain control.     General risks of pelvic reconstructive surgery: Specific risks for your procedure are discussed separately  Anesthesia: With modern anesthetics and monitoring equipment, complications due to anesthesia are very rare. Your anesthesiologist will discuss what will be most suitable for you on the day of surgery  Bleeding: All surgery  results in bleeding, however this surgery usually amounts to blood loss between a tablespoon and a teacup. Large amounts of blood loss that requires a blood transfusion are not common (only 1-2% of women) in prolapse surgery.  Blood transfusion, if needed, is safe. Minor reactions like fever or allergy occur in less than 1% of transfusions. Risks of an infection or mismatched blood is very rare (less than 1 out of every 100,000 transfusions).   Infection: The most common infection with prolapse surgery is a urinary tract infection (UTI). This is easily treated. Wound infections occur in 2-3% of patients. Rarely, infection of the abdominal/vaginal wounds may occur, or abscesses in the pelvis may develop. These infections may need to be treated with antibiotics or another procedure to fix them. Risk factors for infections and wound complications include diabetes, smoking, obesity, and other chronic illnesses.  Blood Clots: Clots in the blood vessels of the legs and lungs are potentially dangerous and can occur in patients undergoing prolapse surgery. This is prevented with leg compression during surgery, and sometimes, an injected blood thinner. We strongly encourage you to stay mobile because this is an important way to prevent clots.  Damage to surrounding organs. The pelvic organs are all very close together. The risk of damage to other organs increases if you have had prior pelvic surgeries, as well as a history of endometriosis or pelvic infection. These conditions can cause scar tissue to develop in the pelvis. Scar tissue can cause organs to stick together, making the surgery more difficult.    Ureter and bladder damage: The ureters are tubes that carry urine from the kidneys to the bladder. They travel very close to the uterus and vagina. The bladder is attached to both your uterus and the front of the vagina. Damage/injury can happen during prolapse-repair procedures. A cystoscopy (camera in the bladder) will  be done during your surgery to ensure there is no bladder or ureter damage/injury. If injury occurs, it may require temporary placement of a stent (drainage tube). In rare cases, a larger abdominal incision may be needed to repair the injury. A bladder catheter may need to stay in place temporarily after surgery.  Bowel damage: Bowel damage/injury is uncommon during your surgery. Any damage that is seen in surgery will be fixed. Very rarely, a temporary ostomy (connection of bowel to the front of the abdomen and collection of stool with a bag) is required for a higher-risk bowel injury.  Vascular damage: Major damage to blood vessels is uncommon. If this occurs, it may require a larger surgery and/or blood transfusion.  Fistula: A fistula is an abnormal connection between the vagina and either the bladder or rectum. A fistula leads to leakage of urine or stool. These are rare complications that arise during healing from pelvic surgery that sometimes require additional surgeries to correct. We take great care is during your surgery to prevent these fistulas. If they do occur, your Urogynecologist is the leading expert in fixing them.        Main Surgical Procedure:    Vaginal hysterectomy and vault suspension  (remove uterus through vagina and support the top of the vagina with sutures)       The entire procedure will be completed in a minimally invasive manner, with all incisions made inside the vagina. After you are asleep with anesthesia, a thorough exam will be performed to confirm the areas needing repair. The uterus will then be removed through the vagina (hysterectomy). The fallopian tubes will be removed if they are accessible through the vagina.     If you have decided to also have your ovaries removed, (oophorectomy), this will be performed if they are accessible through the vagina. A uterosacral ligament suspension will then be performed. This involves stitching the uterosacral ligaments to the apex (top)  of the vagina and lifting it back into a normal position.       We will then look into your bladder with a camera to make sure no damage was done, and that your ureters (the tubes that carry urine from the kidneys to the bladder) are working properly.    It should be noted that if you have not yet gone through menopause, once the uterus is removed you will no longer have your period. This means you can no longer become pregnant or have children without a surrogate.    In addition to the general surgical risks listed in the pre-operative counseling pamphlet, this surgery carries higher risk of injury to the ureters (the tubes that carry urine from the kidneys to the bladder). This type of injury is quoted to occur in 1-10% of women undergoing this procedure. Your surgeon will look into the bladder with a camera (cystoscopy) to ensure that no damage was done. Often, if the ureter is caught in one of the sutures, the suture is simply cut and ureter function is restored. Rarely, significant damage is done which requires a stent (tube) to be temporarily placed in the ureter, or less commonly, a larger incision made in the abdomen to fix the ureter and bladder.     After this procedure is complete, you will be re-examined and then proceed with the following possible procedures:      Main Surgical Procedure  Anterior repair   (fix prolapse of the vagina/bladder)       The entire procedure will be completed in a minimally invasive manner with all incisions made inside the vagina. Once you are asleep with anesthesia, a thorough exam will be performed to confirm the areas needing repair. A cut will be made along the front wall of your vagina where the bladder is pushing down, and your skin is  from the underlying supportive tissue. This stronger tissue underneath will then be repaired using sutures that will dissolve over time.   Sometimes, excess skin is removed. The skin is then closed. A cystoscopy (camera in  "bladder) will be performed to confirm the repair is successful and no injury has occurred.     Specific risks for this procedure include damage to the bladder, urethra (the tube where urine comes out when you pee) or ureters (the tubes that bring urine from the kidneys to the bladder). This is uncommon. Bladder damage may result in having a catheter in the bladder for a longer amount of time.       Main surgical procedure:  Posterior repair, perineorrhaphy  (fix prolapse of the back of the vagina/rectum and pelvic muscles)        The entire procedure will be completed in a minimally invasive manner, with all incisions inside of the vagina. Once you are asleep, a thorough exam will be performed to confirm the areas needing repair. A cut is made along the back wall of the vagina where the rectum is pushing down, and the skin above the rectum is  from the underlying supportive tissue. This stronger tissue underneath is then repaired using sutures that will dissolve over time. Sometimes excess skin is removed. The skin is then closed.     If the area between the vagina and the anus (called the perineum) is weak, then sutures will be placed to make that area stronger. This is called a \"perineorrhaphy.\" This will be just like a repair of an episiotomy or a vaginal tear after having a baby.     The risk of these procedures is similar to those mentioned in the “pre-op” leaflet. However, any surgery to the back wall of the vagina carries a higher risk of pain with sexual intercourse after surgery (up to 10%) due to scar formation. Great care is taken not to narrow the vagina more than necessary. The perineorrhaphy (or episiotomy type of repair) can be uncomfortable and may be difficult to sit normally for up to 6 weeks after surgery. You may use a doughnut cushion to sit on if needed.          Post-op: What to expect after your surgery         Recovery room  You can expect to stay in the recovery room for a few " hours until you are alert. There may be a gauze packing in your vagina, which will be removed before you go home. Pain is normal after surgery but should be tolerable. Your pain will become less over the first 2 weeks. If your pain is difficult to control or you need more nursing care, you will stay in the hospital overnight. Most patients do very well going home on the day of surgery.     Bladder function  You will wake up with a small tube (catheter) in your bladder. A bladder test, called a “void trial”, will be performed by the nurse before you go home. The test is done to make sure you can empty your bladder normally. To do the bladder test, the nurse will fill your bladder with sterile water, remove the catheter, and give you 30 minutes to try and urinate (pee) on your own. If you cannot urinate, or if you only urinate a small amount, you will need to:   Go home with a catheter that stays in your bladder OR  Learn to put a catheter into your bladder a few times a day to empty it    Use of a catheter is temporary and usually needed for 2-4 days. It is very common for the bladder to work slowly, or sluggishly, after this type of surgery. One in every 3-4 patients will require some type of catheterization. An antibiotic may be prescribed while the catheter is in place to decrease the risk of infection.    Call the surgeon for treatment, if you have signs and symptoms of a urinary tract infection, including:  Burning with urination  Bladder pain  Worsening need to urinate right away,  Urine with a bad smell    Vaginal care  Do not go swimming, take sitting baths, and have sexual intercourse for 6 weeks after surgery Do not place anything in your vagina except vaginal estrogen cream, if instructed to do so by your surgeon.     Vaginal discharge and bleeding/spotting is also normal through the entire 6-week recovery. Sometimes small sutures will fall out of the vagina as they dissolve. This is normal. Contact the  office with any heavy bleeding, foul discharge or worsening pain.     Pain management  Surgical pain is controlled in most patients with only acetaminophen (Tylenol) and non-steroidal anti-inflammatory drugs (NSAIDs), such as ibuprofen (Advil). These drugs can be taken together because they do not interact with each other. Check your prescription for specific dosing instructions. A cold compress can help with pain in the vaginal area.  Sometimes, a short course of narcotics, such as oxycodone or hydromorphone is required. We do not recommend using narcotics regularly as it can lead to constipation or dizziness and falls.     Do not drive or operate heavy machinery while using narcotics. You are unlikely to become addicted if you need to take a narcotic medication a few times within the first week of your surgery.  After the first few days to one week, your pain should decrease and you should not have pain severe enough to need narcotics. If you continue having severe pain, contact your surgeon for re-evaluation.     Bowel function  Constipation is common after surgery. This means it may take several days before having a normal stool, or bowel movement. It is important to take extra steps to keep your stools soft to avoid straining with bowel movements. Straining could damage the prolapse repair before it has healed. Most patients will be given a prescription for a stool softener (docusate) as well as a gentle laxative (Miralax or lactulose). These drugs add water to the stool to make it easier to pass. Take them daily throughout your recovery. Hold for a day if you develop diarrhea.     Call us if you experience any repeated episodes of vomiting, worsening abdominal pain/bloating, or are unable to have a bowel movement for more than 3 days.      Activity restrictions  During the first 6 weeks, avoid any type of heavy lifting that requires straining.  Gentle walking is good exercise. Start with about 10 minutes a day  when you feel ready and build up gradually. Avoid repetitive squatting or bending at the waist. Avoid any fitness-type training, aerobics, etc. for at least 6 weeks after surgery. Generally, you will need 4-6 weeks off work. This period may be longer if you have a very physical job.    Return to sex  When your surgeon clears you to resume sex (if desired), begin slowly and use enough lubrication to help ease the discomfort.  Because your vagina and pelvis have been fixed, or re-structured, it can take time to get used to sex after surgery. As scar tissue softens over time, you will feel less discomfort. If the discomfort does not go away, contact the office to schedule an exam and discuss other options. In some cases, your surgeon may prescribe a low dose of vaginal estrogen to help with vaginal dryness and pain that may happen with sex.

## 2024-05-02 NOTE — PROGRESS NOTES
PT here today for consult   Ref for prolapse   Hysterectomy? X  Good #: [phone]   PVR : 55 mL  Pharmacy Verified

## 2024-05-17 ENCOUNTER — TELEPHONE (OUTPATIENT)
Dept: OBGYN | Facility: CLINIC | Age: 69
End: 2024-05-17
Payer: MEDICARE

## 2024-05-17 NOTE — TELEPHONE ENCOUNTER
Caller Name: Mari Kinsey    Call Back Number: [phone]     How would the patient prefer to be contacted with a response: Phone call do NOT leave a detailed message    Patient called to schedule appointment for surgery with Dr. Sorto.  She is free after 10:15 am today 05/17/23 to receive a call back for scheduling

## 2024-05-20 ENCOUNTER — TELEPHONE (OUTPATIENT)
Dept: OBGYN | Facility: CLINIC | Age: 69
End: 2024-05-20
Payer: MEDICARE

## 2024-05-20 NOTE — TELEPHONE ENCOUNTER
Patient left VM on CMA line requesting a call back from [facility] surgery scheduler regarding surgery date .  Encounter routed.

## 2024-05-29 ENCOUNTER — HOSPITAL ENCOUNTER (OUTPATIENT)
Dept: LAB | Facility: MEDICAL CENTER | Age: 69
End: 2024-05-29
Attending: INTERNAL MEDICINE
Payer: MEDICARE

## 2024-05-29 ENCOUNTER — TELEPHONE (OUTPATIENT)
Dept: MEDICAL GROUP | Facility: MEDICAL CENTER | Age: 69
End: 2024-05-29
Payer: MEDICARE

## 2024-05-29 DIAGNOSIS — E03.8 OTHER SPECIFIED HYPOTHYROIDISM: Chronic | ICD-10-CM

## 2024-05-29 LAB — TSH SERPL DL<=0.005 MIU/L-ACNC: 6.77 UIU/ML (ref 0.38–5.33)

## 2024-05-29 RX ORDER — LEVOTHYROXINE SODIUM 0.12 MG/1
TABLET ORAL
Qty: 96 TABLET | Refills: 0 | Status: SHIPPED | OUTPATIENT
Start: 2024-05-29

## 2024-05-30 NOTE — TELEPHONE ENCOUNTER
Notified with results, TSH higher 6.7 on levothyroxine 125 mcg daily, previously TSH was 0.06 on levothyroxine 200 mcg 6 days a week.  Since she has over a month left of the levothyroxine 125 mcg, we will change to levothyroxine 125 mcg 1 tablet 6 days a week, 2 tablets on day 7.  She is taking the medication appropriately, with water only, waiting at least 30 to 60 minutes before other meals, medications, supplements, coffee.  I sent an update to her pharmacy regarding the dosing change, she does not need a refill just yet, we will need to repeat the TSH in 6 weeks, order printed and mailed to her that will be sometime mid July.

## 2024-07-18 ENCOUNTER — HOSPITAL ENCOUNTER (OUTPATIENT)
Dept: LAB | Facility: MEDICAL CENTER | Age: 69
End: 2024-07-18
Attending: INTERNAL MEDICINE
Payer: MEDICARE

## 2024-07-18 DIAGNOSIS — E03.8 OTHER SPECIFIED HYPOTHYROIDISM: Chronic | ICD-10-CM

## 2024-07-18 LAB — TSH SERPL DL<=0.005 MIU/L-ACNC: 1.64 UIU/ML (ref 0.38–5.33)

## 2024-07-18 PROCEDURE — 84443 ASSAY THYROID STIM HORMONE: CPT

## 2024-07-18 PROCEDURE — 36415 COLL VENOUS BLD VENIPUNCTURE: CPT

## 2024-07-19 ENCOUNTER — TELEPHONE (OUTPATIENT)
Dept: MEDICAL GROUP | Facility: MEDICAL CENTER | Age: 69
End: 2024-07-19
Payer: MEDICARE

## 2024-07-26 ENCOUNTER — HOSPITAL ENCOUNTER (OUTPATIENT)
Facility: MEDICAL CENTER | Age: 69
End: 2024-07-26
Attending: INTERNAL MEDICINE
Payer: MEDICARE

## 2024-07-26 LAB
ALBUMIN SERPL BCP-MCNC: 4.1 G/DL (ref 3.2–4.9)
ALBUMIN/GLOB SERPL: 1.9 G/DL
ALP SERPL-CCNC: 92 U/L (ref 30–99)
ALT SERPL-CCNC: 12 U/L (ref 2–50)
ANION GAP SERPL CALC-SCNC: 12 MMOL/L (ref 7–16)
AST SERPL-CCNC: 15 U/L (ref 12–45)
BILIRUB SERPL-MCNC: 0.2 MG/DL (ref 0.1–1.5)
BUN SERPL-MCNC: 19 MG/DL (ref 8–22)
CALCIUM ALBUM COR SERPL-MCNC: 9.2 MG/DL (ref 8.5–10.5)
CALCIUM SERPL-MCNC: 9.3 MG/DL (ref 8.5–10.5)
CHLORIDE SERPL-SCNC: 105 MMOL/L (ref 96–112)
CO2 SERPL-SCNC: 26 MMOL/L (ref 20–33)
CREAT SERPL-MCNC: 0.98 MG/DL (ref 0.5–1.4)
GFR SERPLBLD CREATININE-BSD FMLA CKD-EPI: 63 ML/MIN/1.73 M 2
GLOBULIN SER CALC-MCNC: 2.2 G/DL (ref 1.9–3.5)
GLUCOSE SERPL-MCNC: 102 MG/DL (ref 65–99)
LDH SERPL L TO P-CCNC: 172 U/L (ref 107–266)
POTASSIUM SERPL-SCNC: 4.4 MMOL/L (ref 3.6–5.5)
PROT SERPL-MCNC: 6.3 G/DL (ref 6–8.2)
SODIUM SERPL-SCNC: 143 MMOL/L (ref 135–145)

## 2024-07-26 PROCEDURE — 80053 COMPREHEN METABOLIC PANEL: CPT

## 2024-07-26 PROCEDURE — 83615 LACTATE (LD) (LDH) ENZYME: CPT

## 2024-09-16 ENCOUNTER — APPOINTMENT (OUTPATIENT)
Dept: ADMISSIONS | Facility: MEDICAL CENTER | Age: 69
End: 2024-09-16
Payer: MEDICARE

## 2024-09-16 ENCOUNTER — APPOINTMENT (OUTPATIENT)
Dept: ADMISSIONS | Facility: MEDICAL CENTER | Age: 69
End: 2024-09-16
Attending: STUDENT IN AN ORGANIZED HEALTH CARE EDUCATION/TRAINING PROGRAM
Payer: MEDICARE

## 2024-09-22 RX ORDER — LEVOTHYROXINE SODIUM 125 UG/1
TABLET ORAL
Qty: 96 TABLET | Refills: 2 | Status: SHIPPED | OUTPATIENT
Start: 2024-09-22

## 2024-09-25 ENCOUNTER — OFFICE VISIT (OUTPATIENT)
Dept: SLEEP MEDICINE | Facility: MEDICAL CENTER | Age: 69
End: 2024-09-25
Attending: NURSE PRACTITIONER
Payer: MEDICARE

## 2024-09-25 VITALS
OXYGEN SATURATION: 92 % | BODY MASS INDEX: 35.88 KG/M2 | DIASTOLIC BLOOD PRESSURE: 60 MMHG | WEIGHT: 195 LBS | RESPIRATION RATE: 16 BRPM | HEART RATE: 87 BPM | HEIGHT: 62 IN | SYSTOLIC BLOOD PRESSURE: 102 MMHG

## 2024-09-25 DIAGNOSIS — G47.33 OSA (OBSTRUCTIVE SLEEP APNEA): ICD-10-CM

## 2024-09-25 PROCEDURE — 99214 OFFICE O/P EST MOD 30 MIN: CPT | Performed by: NURSE PRACTITIONER

## 2024-09-25 PROCEDURE — 3074F SYST BP LT 130 MM HG: CPT | Performed by: NURSE PRACTITIONER

## 2024-09-25 PROCEDURE — 99213 OFFICE O/P EST LOW 20 MIN: CPT | Performed by: NURSE PRACTITIONER

## 2024-09-25 PROCEDURE — 3078F DIAST BP <80 MM HG: CPT | Performed by: NURSE PRACTITIONER

## 2024-09-25 ASSESSMENT — FIBROSIS 4 INDEX: FIB4 SCORE: 0.94

## 2024-09-25 NOTE — PROGRESS NOTES
Chief Complaint   Patient presents with    Follow-Up     SLEEP - ESTABLISHED PT CHART PREP COMPLETED ON 09/25/2024     Last Office Visit 09/08/2023 with Castillo Borrego    1st Compliance: No     DME: [facility]    PAP/O2/OAT: N/A    Wireless Data? YES AirSense 10 AutoSet  Set pressure (cmH2O)  9.0 RESMED         HPI:  Mari Kinsey is a 68 y.o. year old female here today for follow-up on ALLEN. Former PMA patient. PMH includes dyslipidemia, hypothyroidism, follicular lymphoma, ALLEN, obesity, deviated nasal septum, allergic rhinitis, never smoker, tonsillectomy.     Patient has a ResMed CPAP machine.  She is currently using a fullface mask with standard tubing.  She denies any difficulty with mask fit or pressures.  She averages 6 to 7 hours of sleep and denies any difficulty falling asleep, but occasionally wakes up at 3 AM and it takes her an hour to fall back asleep.  Overall she notes improvement in sleep quality and denies any excessive daytime sleepiness, morning headaches, palpitations, concentration or memory problems.     30-day compliance reviewed with patient shows 100% use with an average time of 6 hours and 37 minutes and residual AHI of 0.3.      Sleep/Card Study History:   Echo from 11/20/17 indicated normal left ventricular chamber size. Normal left ventricular wall thickness. Normal left ventricular systolic function. Left ventricular ejection fraction is visually estimated to be 70%. Normal regional wall motion. Normal diastolic function. Right ventricular systolic pressure is estimated to be 35 mmHg. Normal aortic root for body surface area.     PSG from 1/2011 indicated an AHI of 28.3 and low oxygen of 83%.     ROS: As per HPI and otherwise negative if not stated.    Past Medical History:   Diagnosis Date    Cancer (HCC)     Chickenpox     Dental disorder     upper bridge    English measles     Gynecological disorder 4/24    Had D&C    Hypothyroidism     Influenza     Mumps     ALLEN (obstructive sleep  "apnea)     Sleep apnea     cpap    Snoring     sleep study done    Thyroid disease     Tonsillitis        Past Surgical History:   Procedure Laterality Date    HYSTEROSCOPY WITH MYOSURE N/A 04/03/2024    Procedure: HYSTEROSCOPY, DILATION AND CURETTAGE;  Surgeon: Crista Wallace D.O.;  Location: SURGERY SAME DAY [facility];  Service: Gynecology    DILATION AND CURETTAGE N/A 04/03/2024    Procedure: DILATION AND CURETTAGE;  Surgeon: Crista Wallace D.O.;  Location: SURGERY SAME DAY [facility];  Service: Gynecology    KALE BY LAPAROSCOPY  02/03/2016    Procedure: KALE BY LAPAROSCOPY;  Surgeon: Tricia Farah M.D.;  Location: SURGERY SAME DAY [facility];  Service:     LYMPH NODE SAMPLING  02/03/2016    Procedure: LYMPH NODE SAMPLING EXCISION, BIOPSY;  Surgeon: Tricia Farah M.D.;  Location: SURGERY SAME DAY [facility];  Service:     BONE GRAFT      at 19 years of age    BUNIONECTOMY      HYSTEROSCOPY WITH VIDEO DIAGNOSTIC      NASAL SEPTAL RECONSTRUCTION      OTHER      bone graft to ;hand    OTHER ABDOMINAL SURGERY  2019?    Removed gall bladder    OTHER ORTHOPEDIC SURGERY  1973    Bone graft from hip to 5th metacarpal (only cartilage there)    TONSILLECTOMY         Family History   Problem Relation Age of Onset    Arthritis Mother         osteoporosis    Hypertension Mother     Hyperlipidemia Mother     Heart Disease Father         Heart murmur    Lung Disease Father         Emphysema (longtime smoker)    No Known Problems Sister     No Known Problems Sister     Alcohol abuse Sister     No Known Problems Brother     No Known Problems Brother     No Known Problems Brother     Sleep Apnea Neg Hx        Allergies as of 09/25/2024 - Reviewed 09/25/2024   Allergen Reaction Noted    Penicillins Rash 02/26/2010        Vitals:  /60 (BP Location: Left arm, Patient Position: Sitting, BP Cuff Size: Adult)   Pulse 87   Resp 16   Ht 1.575 m (5' 2\")   Wt 88.5 kg (195 lb)   SpO2 92%     Current medications as " of today   Current Outpatient Medications   Medication Sig Dispense Refill    levothyroxine (SYNTHROID) 125 MCG Tab Frequency change to one pill 6 days per week, two pills on day 7 so a total of 8 tabs every 7 days  Indications: low thyroid 96 Tablet 2    Magnesium Glycinate 100 MG Cap Take 200 mg by mouth every day.          **MEDICATION INSTRUCTIONS FOR SURGERY/PROCEDURE SCHEDULED FOR 10/8/2024   DO NOT TAKE FOR 7 DAYS PRIOR TO SURGERY      estradiol (ESTRACE VAGINAL) 0.1 MG/GM vaginal cream Apply 1g cream inside vagina twice per week (Patient taking differently: Apply 1g cream inside vagina twice per week         **MEDICATION INSTRUCTIONS FOR SURGERY/PROCEDURE SCHEDULED FOR 10/8/2024   DO NOT TAKE MORNING OF SURGERY.) 1 Each 6    Multiple Vitamins-Minerals (CENTRUM MINIS WOMEN 50+ PO) Take  by mouth every day.         **MEDICATION INSTRUCTIONS FOR SURGERY/PROCEDURE SCHEDULED FOR 10/8/2024   DO NOT TAKE FOR 7 DAYS PRIOR TO SURGERY      Multiple Vitamins-Minerals (PRESERVISION AREDS 2 PO) Take  by mouth every day.         **MEDICATION INSTRUCTIONS FOR SURGERY/PROCEDURE SCHEDULED FOR 10/8/2024   DO NOT TAKE FOR 7 DAYS PRIOR TO SURGERY      vitamin D (CHOLECALCIFEROL) 1000 UNIT Tab Take 2,000 Units by mouth every day.         **MEDICATION INSTRUCTIONS FOR SURGERY/PROCEDURE SCHEDULED FOR 10/8/2024   DO NOT TAKE FOR 7 DAYS PRIOR TO SURGERY       No current facility-administered medications for this visit.         Physical Exam:   Gen:           Alert and oriented, No apparent distress. Mood and affect appropriate, normal interaction with examiner.  Eyes:          PERRL, EOM intact, sclere white, conjunctive moist.  Ears:          Not examined.   Hearing:     Grossly intact.  Nose:          Normal, no lesions or deformities.  Dentition:    Good dentition.  Oropharynx:   Tongue normal, posterior pharynx without erythema or exudate.  Neck:        Supple, trachea midline, no masses.  Respiratory Effort: No intercostal  retractions or use of accessory muscles.   Lung Auscultation:      Clear to auscultation bilaterally; no rales, rhonchi or wheezing.  CV:            Regular rate and rhythm. No murmurs, rubs or gallops.  Abd:           Not examined.   Lymphadenopathy: Not examined.  Gait and Station: Normal.  Digits and Nails: No clubbing, cyanosis, petechiae, or nodes.   Cranial Nerves: II-XII grossly intact.  Skin:        No rashes, lesions or ulcers noted.               Ext:           No cyanosis or edema.      Assessment:  1. ALLEN (obstructive sleep apnea)  DME Mask and Supplies    Overnight Home Sleep Study        Plan:  Using and benefiting from CPAP therapy.  Compliance shows adequate use and control of ALLEN.  Order placed for mask and supplies which will give for 1 year.  Advise annual follow-up, but can be seen sooner if needed.    Please note that this dictation was created using voice recognition software. I have made every reasonable attempt to correct obvious errors, but it is possible there are errors of grammar and possibly content that I did not discover before finalizing the note.

## 2024-09-25 NOTE — PROGRESS NOTES
Urogynecology & Reconstructive Pelvic Surgery - Consultation Visit    Mari Kinsey [id number] :1955    Referred by: Crista Wallace DO    Reason for Visit:   Chief Complaint   Patient presents with    Other     Pre Op + CMG         Subjective     History of Presenting Illness:    Mari Kinesy is a 68 y.o. P1 who plans for follow-up for prolapse    She does have a chance to review all preoperative materials and makes questions    She underwent preoperative reduction cystometrogram without stress incontinence noted    HPI: She presents for the evaluation and management of prolapse.     She was told she had mild prolapse many years ago, and slowly started to become bothersome.  Is now protruding further outside the vagina, noted is a grade 3 cystocele by Dr. Wallace.  She has tried a pessary before but this was uncomfortable and fell out    She is recently s/p hysteroscopy/polypectomy (benign).    Prior Pelvic surgery:    hysteroscopic myomectomy   hysteroscopy (benign)     Prior treatment:   Vesy/Pfilates (Dr Perez.   Pessary - very uncomfortable     Pelvic floor symptom review:     Bladder:   Voids per day: 6 Voids per night: 1      Urinary incontinence episodes per day: none    Bladder emptying: Complete   Voiding symptoms: None   UTI in last 12 months: no   Other urologic history: no      Prolapse:     Prolapse symptoms: Bulge and Exteriorized Tissue   Degree of prolapse: Beyond Introitus   Duration of prolapse symptoms: years      Bowel:    Hemorrhoids      Sexual function:    Sexually active: No  - partner issues.    Gender of partners: Male   Pain with intercourse: No       Pelvic Pain: no      Past medical and surgical history    Past obstetric history   Number of vaginal deliveries: 1   Number of  deliveries: 0   History of vacuum/forceps: No    History of obstetric anal sphincter injury: No     Past gynecological history:    Last menstrual period: No LMP recorded.  Patient is postmenopausal.   History of abnormal uterine bleeding: s/p polypectomy (benign)   History of fibroids: No    History of endometrial polyps:  Yes   History of endometriosis: No    History of cervical dysplasia: No       Past medical history:  Past Medical History:   Diagnosis Date    Cancer (HCC)     Chickenpox     Dental disorder     upper bridge    American measles     Gynecological disorder 4/24    Had D&C    Hypothyroidism     Influenza     Mumps     ALLEN (obstructive sleep apnea)     Sleep apnea     cpap    Snoring     sleep study done    Thyroid disease     Tonsillitis      Past surgical history:  Past Surgical History:   Procedure Laterality Date    HYSTEROSCOPY WITH MYOSURE N/A 04/03/2024    Procedure: HYSTEROSCOPY, DILATION AND CURETTAGE;  Surgeon: Crista Wallace D.O.;  Location: SURGERY SAME DAY [facility];  Service: Gynecology    DILATION AND CURETTAGE N/A 04/03/2024    Procedure: DILATION AND CURETTAGE;  Surgeon: Crista Wallace D.O.;  Location: SURGERY SAME DAY [facility];  Service: Gynecology    KALE BY LAPAROSCOPY  02/03/2016    Procedure: KALE BY LAPAROSCOPY;  Surgeon: Tricia Farah M.D.;  Location: SURGERY SAME DAY [facility];  Service:     LYMPH NODE SAMPLING  02/03/2016    Procedure: LYMPH NODE SAMPLING EXCISION, BIOPSY;  Surgeon: Tricia Farah M.D.;  Location: SURGERY SAME DAY [facility];  Service:     BONE GRAFT      at 19 years of age    BUNIONECTOMY      HYSTEROSCOPY WITH VIDEO DIAGNOSTIC      NASAL SEPTAL RECONSTRUCTION      OTHER      bone graft to ;hand    OTHER ABDOMINAL SURGERY  2019?    Removed gall bladder    OTHER ORTHOPEDIC SURGERY  1973    Bone graft from hip to 5th metacarpal (only cartilage there)    TONSILLECTOMY       Medications:has a current medication list which includes the following prescription(s): levothyroxine, magnesium glycinate, estradiol, multiple vitamins-minerals, multiple vitamins-minerals, and vitamin d.  Allergies:Penicillins  Family  history:  Family History   Problem Relation Age of Onset    Arthritis Mother         osteoporosis    Hypertension Mother     Hyperlipidemia Mother     Heart Disease Father         Heart murmur    Lung Disease Father         Emphysema (longtime smoker)    No Known Problems Sister     No Known Problems Sister     Alcohol abuse Sister     No Known Problems Brother     No Known Problems Brother     No Known Problems Brother     Sleep Apnea Neg Hx      Social history: reports that she has never smoked. She has been exposed to tobacco smoke. She has never used smokeless tobacco. She reports that she does not drink alcohol and does not use drugs.    Review of systems: A full review of systems was performed, and negative with the exception of want is noted above in the HPI.        Objective        BP (!) 145/67 (BP Location: Left arm, Patient Position: Sitting, BP Cuff Size: Adult)   Pulse 67   Wt 195 lb   BMI 35.67 kg/m²     Physical Exam  Vitals reviewed. Exam conducted with a chaperone present (MA - see notes.).   Constitutional:       Appearance: Normal appearance.   HENT:      Head: Normocephalic.      Mouth/Throat:      Mouth: Mucous membranes are moist.   Cardiovascular:      Rate and Rhythm: Normal rate.   Pulmonary:      Effort: Pulmonary effort is normal.   Abdominal:      Palpations: Abdomen is soft. There is no mass.      Tenderness: There is no abdominal tenderness.   Skin:     General: Skin is warm and dry.   Neurological:      Mental Status: She is alert.   Psychiatric:         Mood and Affect: Mood normal.         Genitourinary:    Vulva: petechiae   Bulbocavernosus reflex: Intact   Anal wink reflex: Intact   Perineal sensation: WNL   Urethra: Hypermobile   Vagina: Atrophic   Atrophy: Mild   Cough stress test: Negative    Pelvic floor:    POP-Q: Aa +1.5 / Ba +1.5 / TVL 8 / C -1 / D -3 / Ap 0 / Bp 0 / GH 4 / PB 2.5   Prolapse stage: 3   Paravaginal defect: mild   Cervical elongation: No    Urethral  tenderness: No    Bladder/ suprapubic tenderness: No    Levator tenderness: None   Levator muscle tone: Hypertonic   Pelvic floor contraction strength (modified Oxford scale): 2=Weak   Pelvic floor contraction duration: Brief    Bimanual exam: Small, Mobile Uterus     Procedure Performed:    Simple cystometrogram: Normal capacity and compliance.  No leak with full bladder and reduction of prolapse    Diagnostic test and records review:    Post-void residual: 15 mL by cath    Labs: n/a    Radiology:     Pelvic US 2/2024 (reviewed, scan prior to polypectomy)  The uterus measures 2.51 cm x 5.56 cm x 3.16 cm.  3 uterine masses which statistically most likely represent uterine fibroids. There is an exophytic calcified mass at the right uterine body measuring 2.9 x 3.2 x 2.8 cm. There is an echogenic intramural mass at the anterior uterine fundus measuring 1.5 x   1.7 x 1.0 cm. Heterogeneous hypoechoic mass at the posterior uterine body/fundus measuring 1.5 x 1.4 x 1.5 cm.  The endometrial echo complex measures 0.59 cm.  The endometrium is thickened for patient age as there is postmenopausal bleeding. Cannot exclude endometrial hyperplasia or neoplasia.  OVARIES:  The right ovary measures 1.46 cm x 2.07 cm x 1.25 cm. Duplex Doppler examination of the right ovary shows normal waveforms.  The left ovary measures 1.74 cm x 1.12 cm x 0.85 cm. Duplex Doppler examination of the left ovary shows normal waveforms.  There is no free fluid seen.  IMPRESSION:  1.  Endometrial stripe thickness of 5.9 mm which is considered thickened in the setting of postmenopausal bleeding. Cannot exclude endometrial hyperplasia or neoplasia.  2.  3 small uterine masses which statistically most likely represent uterine fibroids.    Procedural: n/a    Documentation reviewed: Prior EMR Records         Assessment & Plan     Mari Kinsey is a 68 y.o. P1 with Medicare vaginal prolapse.     1. Incomplete uterovaginal prolapse  2. Cystocele,  lateral  3. Rectocele  4. H/o AUB due to endometrial polyp, s/p polypectomy (benign)  - s/p pelvic PT ([facility])  - Simple CMG with prolapse reduction did not note stress incontinence, recommend against concomitant sling-   - At her prior visit as well as today we reviewed all treatment options for prolapse including conservative/observation, pessary, and both vaginal and laparoscopic approaches as well as native tissue and mesh augmented approaches. After detailed counseling about all prolapse treatment options and shared decision-making, she opts for a native tissue vaginal reconstructive approach to prolapse repair via Laparoscopic assisted vaginal hysterectomy, bilateral salpingo-oophorectomy, vault suspension, anterior/posterior repair, perineorrhaphy, and all indicated procedures. .  She has reviewed all preoperative written materials and expressed understanding about the procedure, risks, benefits, and recovery    Benefits of surgery were reviewed, including functional outcomes (bladder/bowel/sexual). Risks of surgery were  also discussed including anesthesia, bleeding, infection, damage to surrounding organs (bladder, ureter, urethra, bowel, blood vessel, nerves), possible blood transfusion, recurrent prolapse, transient buttock pain if sacrospinous suspension performed, transient voiding dysfunction requiring catheterization, new/worsening urinary incontinence, pain with sex.     Specifically she was counselled as to what to expect on the day of surgery in the holding area, counseled that medical students may be involved in her care. She will likely go home on the same day as the surgery. She was counseled on what to expect in the recovery room including voiding trial and possible vaginal packing removal, as well as what to expect if voiding trial is unsuccessful, which would be transient catheterization.   Discussed trajectory of recovery, including maximizing NSAIDs and Tylenol, and that narcotics are not  routinely given.  Discussed restrictions including heavy lifting that requires straining, driving while on narcotics, nothing in the vagina and no bathing/swimming for at least 6 weeks until evaluated in the office.  Return to work discussed.  *Please see the corresponding after visit summary counseling packet for detailed counseling provided for the patient.     Pre-op meds: acetaminophen 1000mg PO, phenazopyridine 200mg PO, Cefazolin 2gm IV   Post-op medication plan: ibuprofen, tylenol, miralax   Home medication review: She should additionally hold all NSAIDs and supplements for 1 week prior to surgery.  Stop aspirin 1 week prior to surgery.    5. Genitourinary syndrome of menopause  Continue vaginal estrogen twice weekly               Magdalene Sorto MD, FACOG  Urogynecology and Reconstructive Pelvic Surgery  Department of Obstetrics and Gynecology  [facility]        This medical record contains text that has been entered with the assistance of computer voice recognition and dictation software.  Therefore, it may contain unintended errors in text, spelling, punctuation, or grammar

## 2024-09-26 ENCOUNTER — GYNECOLOGY VISIT (OUTPATIENT)
Dept: GYNECOLOGY | Facility: CLINIC | Age: 69
End: 2024-09-26
Payer: MEDICARE

## 2024-09-26 VITALS
BODY MASS INDEX: 35.67 KG/M2 | SYSTOLIC BLOOD PRESSURE: 145 MMHG | DIASTOLIC BLOOD PRESSURE: 67 MMHG | WEIGHT: 195 LBS | HEART RATE: 67 BPM

## 2024-09-26 DIAGNOSIS — N81.12 CYSTOCELE, LATERAL: ICD-10-CM

## 2024-09-26 DIAGNOSIS — N93.9 ABNORMAL UTERINE BLEEDING DUE TO ENDOMETRIAL POLYP: ICD-10-CM

## 2024-09-26 DIAGNOSIS — N81.6 RECTOCELE: ICD-10-CM

## 2024-09-26 DIAGNOSIS — N84.0 ABNORMAL UTERINE BLEEDING DUE TO ENDOMETRIAL POLYP: ICD-10-CM

## 2024-09-26 DIAGNOSIS — N95.8 GENITOURINARY SYNDROME OF MENOPAUSE: ICD-10-CM

## 2024-09-26 DIAGNOSIS — N81.2 INCOMPLETE UTEROVAGINAL PROLAPSE: ICD-10-CM

## 2024-09-26 ASSESSMENT — FIBROSIS 4 INDEX: FIB4 SCORE: 0.94

## 2024-09-26 NOTE — PROGRESS NOTES
PT here for Pre Op + CMG  Surgery with  10/8/2024 @ 10:00 am  PT aware to arrive 2 hours prior  Good # [phone]   Pharmacy Verified

## 2024-09-26 NOTE — PROCEDURES
Procedure Note - Cystometrogram    Procedure: Simple cystometrogram (35775)    Pre-operative diagnosis:  Incomplete uterovaginal prolapse (N81.2), pre-operative assessment for occult stress incontinence with prolapse reduction    Verbal consent was obtained after review of risk and benefit.     Chaperone: Aparna Ignacio MA    Procedure: The patient was placed in the lithotomy position in the exam. She underwent sterile prep with betadine prior to catheterization. There was a negative urinalysis.  A 10F straight catheter was easily placed into the bladder.  With a postvoid residual of 15mL.  The bladder was slowly filled against gravity with sterile water, until capacity was reached. Prolapse was reduced with a cotton scopette for stress testing. There were no complications.     Findings:    First sensation: 100 mL  First desire: 250 mL  Strong Desire: 300 mL  CMG capacity: 300 mL  Stress leakage with prolapse reduction:   Empty: neg  Capacity: neg  Leakage with DO: no  Uninhibited detrusor contractions present: no    Assessment:   No stress incontinence with reduction of prolapse    Plan:   See corresponding E/M visit for full surgical plan.       Magdalene Sorto MD, FACOG    Female Pelvic Medicine and Reconstructive Surgery  Department of Obstetrics and Gynecology  [facility]

## 2024-09-30 ENCOUNTER — APPOINTMENT (OUTPATIENT)
Dept: SLEEP MEDICINE | Facility: MEDICAL CENTER | Age: 69
End: 2024-09-30
Attending: NURSE PRACTITIONER
Payer: MEDICARE

## 2024-09-30 PROBLEM — H26.9 CATARACT: Status: RESOLVED | Noted: 2022-07-05 | Resolved: 2024-09-30

## 2024-10-02 ENCOUNTER — PRE-ADMISSION TESTING (OUTPATIENT)
Dept: ADMISSIONS | Facility: MEDICAL CENTER | Age: 69
End: 2024-10-02
Attending: STUDENT IN AN ORGANIZED HEALTH CARE EDUCATION/TRAINING PROGRAM
Payer: MEDICARE

## 2024-10-02 DIAGNOSIS — Z01.812 PRE-OPERATIVE LABORATORY EXAMINATION: ICD-10-CM

## 2024-10-02 DIAGNOSIS — Z01.810 PRE-OPERATIVE CARDIOVASCULAR EXAMINATION: ICD-10-CM

## 2024-10-02 LAB
ANION GAP SERPL CALC-SCNC: 8 MMOL/L (ref 7–16)
BASOPHILS # BLD AUTO: 0.7 % (ref 0–1.8)
BASOPHILS # BLD: 0.06 K/UL (ref 0–0.12)
BUN SERPL-MCNC: 14 MG/DL (ref 8–22)
CALCIUM SERPL-MCNC: 8.9 MG/DL (ref 8.5–10.5)
CHLORIDE SERPL-SCNC: 106 MMOL/L (ref 96–112)
CO2 SERPL-SCNC: 27 MMOL/L (ref 20–33)
CREAT SERPL-MCNC: 1.03 MG/DL (ref 0.5–1.4)
EKG IMPRESSION: NORMAL
EOSINOPHIL # BLD AUTO: 1.1 K/UL (ref 0–0.51)
EOSINOPHIL NFR BLD: 12.9 % (ref 0–6.9)
ERYTHROCYTE [DISTWIDTH] IN BLOOD BY AUTOMATED COUNT: 46.2 FL (ref 35.9–50)
GFR SERPLBLD CREATININE-BSD FMLA CKD-EPI: 59 ML/MIN/1.73 M 2
GLUCOSE SERPL-MCNC: 85 MG/DL (ref 65–99)
HCT VFR BLD AUTO: 43.5 % (ref 37–47)
HGB BLD-MCNC: 13.9 G/DL (ref 12–16)
IMM GRANULOCYTES # BLD AUTO: 0.02 K/UL (ref 0–0.11)
IMM GRANULOCYTES NFR BLD AUTO: 0.2 % (ref 0–0.9)
LYMPHOCYTES # BLD AUTO: 2.32 K/UL (ref 1–4.8)
LYMPHOCYTES NFR BLD: 27.2 % (ref 22–41)
MCH RBC QN AUTO: 30.4 PG (ref 27–33)
MCHC RBC AUTO-ENTMCNC: 32 G/DL (ref 32.2–35.5)
MCV RBC AUTO: 95.2 FL (ref 81.4–97.8)
MONOCYTES # BLD AUTO: 0.76 K/UL (ref 0–0.85)
MONOCYTES NFR BLD AUTO: 8.9 % (ref 0–13.4)
NEUTROPHILS # BLD AUTO: 4.27 K/UL (ref 1.82–7.42)
NEUTROPHILS NFR BLD: 50.1 % (ref 44–72)
NRBC # BLD AUTO: 0 K/UL
NRBC BLD-RTO: 0 /100 WBC (ref 0–0.2)
PLATELET # BLD AUTO: 327 K/UL (ref 164–446)
PMV BLD AUTO: 9.7 FL (ref 9–12.9)
POTASSIUM SERPL-SCNC: 4.1 MMOL/L (ref 3.6–5.5)
RBC # BLD AUTO: 4.57 M/UL (ref 4.2–5.4)
SODIUM SERPL-SCNC: 141 MMOL/L (ref 135–145)
WBC # BLD AUTO: 8.5 K/UL (ref 4.8–10.8)

## 2024-10-02 PROCEDURE — 85025 COMPLETE CBC W/AUTO DIFF WBC: CPT

## 2024-10-02 PROCEDURE — 93005 ELECTROCARDIOGRAM TRACING: CPT

## 2024-10-02 PROCEDURE — 80048 BASIC METABOLIC PNL TOTAL CA: CPT

## 2024-10-02 PROCEDURE — 36415 COLL VENOUS BLD VENIPUNCTURE: CPT

## 2024-10-02 PROCEDURE — 93010 ELECTROCARDIOGRAM REPORT: CPT | Performed by: INTERNAL MEDICINE

## 2024-10-08 ENCOUNTER — HOSPITAL ENCOUNTER (OUTPATIENT)
Facility: MEDICAL CENTER | Age: 69
End: 2024-10-08
Attending: STUDENT IN AN ORGANIZED HEALTH CARE EDUCATION/TRAINING PROGRAM | Admitting: STUDENT IN AN ORGANIZED HEALTH CARE EDUCATION/TRAINING PROGRAM
Payer: MEDICARE

## 2024-10-08 ENCOUNTER — ANESTHESIA (OUTPATIENT)
Dept: SURGERY | Facility: MEDICAL CENTER | Age: 69
End: 2024-10-08
Payer: MEDICARE

## 2024-10-08 ENCOUNTER — ANESTHESIA EVENT (OUTPATIENT)
Dept: SURGERY | Facility: MEDICAL CENTER | Age: 69
End: 2024-10-08
Payer: MEDICARE

## 2024-10-08 VITALS
TEMPERATURE: 97.3 F | HEIGHT: 62 IN | SYSTOLIC BLOOD PRESSURE: 123 MMHG | RESPIRATION RATE: 20 BRPM | BODY MASS INDEX: 33.71 KG/M2 | HEART RATE: 63 BPM | OXYGEN SATURATION: 92 % | WEIGHT: 183.2 LBS | DIASTOLIC BLOOD PRESSURE: 58 MMHG

## 2024-10-08 LAB — PATHOLOGY CONSULT NOTE: NORMAL

## 2024-10-08 PROCEDURE — 160046 HCHG PACU - 1ST 60 MINS PHASE II: Performed by: STUDENT IN AN ORGANIZED HEALTH CARE EDUCATION/TRAINING PROGRAM

## 2024-10-08 PROCEDURE — 57260 CMBN ANT PST COLPRHY: CPT | Performed by: STUDENT IN AN ORGANIZED HEALTH CARE EDUCATION/TRAINING PROGRAM

## 2024-10-08 PROCEDURE — 160048 HCHG OR STATISTICAL LEVEL 1-5: Performed by: STUDENT IN AN ORGANIZED HEALTH CARE EDUCATION/TRAINING PROGRAM

## 2024-10-08 PROCEDURE — 160035 HCHG PACU - 1ST 60 MINS PHASE I: Performed by: STUDENT IN AN ORGANIZED HEALTH CARE EDUCATION/TRAINING PROGRAM

## 2024-10-08 PROCEDURE — C1713 ANCHOR/SCREW BN/BN,TIS/BN: HCPCS | Performed by: STUDENT IN AN ORGANIZED HEALTH CARE EDUCATION/TRAINING PROGRAM

## 2024-10-08 PROCEDURE — 88307 TISSUE EXAM BY PATHOLOGIST: CPT

## 2024-10-08 PROCEDURE — 160009 HCHG ANES TIME/MIN: Performed by: STUDENT IN AN ORGANIZED HEALTH CARE EDUCATION/TRAINING PROGRAM

## 2024-10-08 PROCEDURE — 58552 LAPARO-VAG HYST INCL T/O: CPT | Mod: AS | Performed by: NURSE PRACTITIONER

## 2024-10-08 PROCEDURE — 57260 CMBN ANT PST COLPRHY: CPT | Mod: AS | Performed by: NURSE PRACTITIONER

## 2024-10-08 PROCEDURE — 700101 HCHG RX REV CODE 250: Performed by: ANESTHESIOLOGY

## 2024-10-08 PROCEDURE — 57283 COLPOPEXY INTRAPERITONEAL: CPT | Mod: 59 | Performed by: STUDENT IN AN ORGANIZED HEALTH CARE EDUCATION/TRAINING PROGRAM

## 2024-10-08 PROCEDURE — 700101 HCHG RX REV CODE 250: Performed by: STUDENT IN AN ORGANIZED HEALTH CARE EDUCATION/TRAINING PROGRAM

## 2024-10-08 PROCEDURE — 58552 LAPARO-VAG HYST INCL T/O: CPT | Performed by: STUDENT IN AN ORGANIZED HEALTH CARE EDUCATION/TRAINING PROGRAM

## 2024-10-08 PROCEDURE — 160041 HCHG SURGERY MINUTES - EA ADDL 1 MIN LEVEL 4: Performed by: STUDENT IN AN ORGANIZED HEALTH CARE EDUCATION/TRAINING PROGRAM

## 2024-10-08 PROCEDURE — 700111 HCHG RX REV CODE 636 W/ 250 OVERRIDE (IP): Performed by: ANESTHESIOLOGY

## 2024-10-08 PROCEDURE — 160047 HCHG PACU  - EA ADDL 30 MINS PHASE II: Performed by: STUDENT IN AN ORGANIZED HEALTH CARE EDUCATION/TRAINING PROGRAM

## 2024-10-08 PROCEDURE — 160002 HCHG RECOVERY MINUTES (STAT): Performed by: STUDENT IN AN ORGANIZED HEALTH CARE EDUCATION/TRAINING PROGRAM

## 2024-10-08 PROCEDURE — 88305 TISSUE EXAM BY PATHOLOGIST: CPT

## 2024-10-08 PROCEDURE — 160029 HCHG SURGERY MINUTES - 1ST 30 MINS LEVEL 4: Performed by: STUDENT IN AN ORGANIZED HEALTH CARE EDUCATION/TRAINING PROGRAM

## 2024-10-08 PROCEDURE — 160025 RECOVERY II MINUTES (STATS): Performed by: STUDENT IN AN ORGANIZED HEALTH CARE EDUCATION/TRAINING PROGRAM

## 2024-10-08 PROCEDURE — 57283 COLPOPEXY INTRAPERITONEAL: CPT | Mod: AS | Performed by: NURSE PRACTITIONER

## 2024-10-08 PROCEDURE — A9270 NON-COVERED ITEM OR SERVICE: HCPCS | Performed by: STUDENT IN AN ORGANIZED HEALTH CARE EDUCATION/TRAINING PROGRAM

## 2024-10-08 PROCEDURE — 700102 HCHG RX REV CODE 250 W/ 637 OVERRIDE(OP): Performed by: STUDENT IN AN ORGANIZED HEALTH CARE EDUCATION/TRAINING PROGRAM

## 2024-10-08 PROCEDURE — 700105 HCHG RX REV CODE 258: Performed by: STUDENT IN AN ORGANIZED HEALTH CARE EDUCATION/TRAINING PROGRAM

## 2024-10-08 RX ORDER — ONDANSETRON 2 MG/ML
INJECTION INTRAMUSCULAR; INTRAVENOUS PRN
Status: DISCONTINUED | OUTPATIENT
Start: 2024-10-08 | End: 2024-10-08 | Stop reason: SURG

## 2024-10-08 RX ORDER — ONDANSETRON 2 MG/ML
4 INJECTION INTRAMUSCULAR; INTRAVENOUS
Status: DISCONTINUED | OUTPATIENT
Start: 2024-10-08 | End: 2024-10-08 | Stop reason: HOSPADM

## 2024-10-08 RX ORDER — SCOLOPAMINE TRANSDERMAL SYSTEM 1 MG/1
1 PATCH, EXTENDED RELEASE TRANSDERMAL
Status: DISCONTINUED | OUTPATIENT
Start: 2024-10-08 | End: 2024-10-08 | Stop reason: HOSPADM

## 2024-10-08 RX ORDER — OXYCODONE HCL 5 MG/5 ML
10 SOLUTION, ORAL ORAL
Status: DISCONTINUED | OUTPATIENT
Start: 2024-10-08 | End: 2024-10-08 | Stop reason: HOSPADM

## 2024-10-08 RX ORDER — HALOPERIDOL 5 MG/ML
1 INJECTION INTRAMUSCULAR
Status: DISCONTINUED | OUTPATIENT
Start: 2024-10-08 | End: 2024-10-08 | Stop reason: HOSPADM

## 2024-10-08 RX ORDER — OXYCODONE HCL 5 MG/5 ML
5 SOLUTION, ORAL ORAL
Status: DISCONTINUED | OUTPATIENT
Start: 2024-10-08 | End: 2024-10-08 | Stop reason: HOSPADM

## 2024-10-08 RX ORDER — PHENAZOPYRIDINE HYDROCHLORIDE 200 MG/1
200 TABLET, FILM COATED ORAL ONCE
Status: COMPLETED | OUTPATIENT
Start: 2024-10-08 | End: 2024-10-08

## 2024-10-08 RX ORDER — HYDROMORPHONE HYDROCHLORIDE 1 MG/ML
0.4 INJECTION, SOLUTION INTRAMUSCULAR; INTRAVENOUS; SUBCUTANEOUS
Status: DISCONTINUED | OUTPATIENT
Start: 2024-10-08 | End: 2024-10-08 | Stop reason: HOSPADM

## 2024-10-08 RX ORDER — SODIUM CHLORIDE, SODIUM LACTATE, POTASSIUM CHLORIDE, CALCIUM CHLORIDE 600; 310; 30; 20 MG/100ML; MG/100ML; MG/100ML; MG/100ML
INJECTION, SOLUTION INTRAVENOUS CONTINUOUS
Status: ACTIVE | OUTPATIENT
Start: 2024-10-08 | End: 2024-10-08

## 2024-10-08 RX ORDER — CEFAZOLIN SODIUM 1 G/3ML
INJECTION, POWDER, FOR SOLUTION INTRAMUSCULAR; INTRAVENOUS PRN
Status: DISCONTINUED | OUTPATIENT
Start: 2024-10-08 | End: 2024-10-08 | Stop reason: SURG

## 2024-10-08 RX ORDER — MIDAZOLAM HYDROCHLORIDE 1 MG/ML
INJECTION INTRAMUSCULAR; INTRAVENOUS PRN
Status: DISCONTINUED | OUTPATIENT
Start: 2024-10-08 | End: 2024-10-08 | Stop reason: SURG

## 2024-10-08 RX ORDER — LIDOCAINE HYDROCHLORIDE 10 MG/ML
INJECTION, SOLUTION EPIDURAL; INFILTRATION; INTRACAUDAL; PERINEURAL
Status: DISCONTINUED
Start: 2024-10-08 | End: 2024-10-08 | Stop reason: HOSPADM

## 2024-10-08 RX ORDER — DEXAMETHASONE SODIUM PHOSPHATE 4 MG/ML
INJECTION, SOLUTION INTRA-ARTICULAR; INTRALESIONAL; INTRAMUSCULAR; INTRAVENOUS; SOFT TISSUE PRN
Status: DISCONTINUED | OUTPATIENT
Start: 2024-10-08 | End: 2024-10-08 | Stop reason: SURG

## 2024-10-08 RX ORDER — ACETAMINOPHEN 500 MG
1000 TABLET ORAL ONCE
Status: COMPLETED | OUTPATIENT
Start: 2024-10-08 | End: 2024-10-08

## 2024-10-08 RX ORDER — LIDOCAINE HYDROCHLORIDE 20 MG/ML
INJECTION, SOLUTION EPIDURAL; INFILTRATION; INTRACAUDAL; PERINEURAL PRN
Status: DISCONTINUED | OUTPATIENT
Start: 2024-10-08 | End: 2024-10-08 | Stop reason: SURG

## 2024-10-08 RX ORDER — BUPIVACAINE HYDROCHLORIDE 2.5 MG/ML
INJECTION, SOLUTION EPIDURAL; INFILTRATION; INTRACAUDAL
Status: DISCONTINUED
Start: 2024-10-08 | End: 2024-10-08 | Stop reason: HOSPADM

## 2024-10-08 RX ORDER — LIDOCAINE HYDROCHLORIDE AND EPINEPHRINE 10; 10 MG/ML; UG/ML
INJECTION, SOLUTION INFILTRATION; PERINEURAL
Status: DISCONTINUED | OUTPATIENT
Start: 2024-10-08 | End: 2024-10-08 | Stop reason: HOSPADM

## 2024-10-08 RX ORDER — BUPIVACAINE HYDROCHLORIDE AND EPINEPHRINE 2.5; 5 MG/ML; UG/ML
INJECTION, SOLUTION EPIDURAL; INFILTRATION; INTRACAUDAL; PERINEURAL
Status: DISCONTINUED | OUTPATIENT
Start: 2024-10-08 | End: 2024-10-08 | Stop reason: HOSPADM

## 2024-10-08 RX ORDER — HYDROMORPHONE HYDROCHLORIDE 1 MG/ML
0.1 INJECTION, SOLUTION INTRAMUSCULAR; INTRAVENOUS; SUBCUTANEOUS
Status: DISCONTINUED | OUTPATIENT
Start: 2024-10-08 | End: 2024-10-08 | Stop reason: HOSPADM

## 2024-10-08 RX ORDER — HYDROMORPHONE HYDROCHLORIDE 1 MG/ML
0.2 INJECTION, SOLUTION INTRAMUSCULAR; INTRAVENOUS; SUBCUTANEOUS
Status: DISCONTINUED | OUTPATIENT
Start: 2024-10-08 | End: 2024-10-08 | Stop reason: HOSPADM

## 2024-10-08 RX ORDER — ROCURONIUM BROMIDE 10 MG/ML
INJECTION, SOLUTION INTRAVENOUS PRN
Status: DISCONTINUED | OUTPATIENT
Start: 2024-10-08 | End: 2024-10-08 | Stop reason: SURG

## 2024-10-08 RX ORDER — DIPHENHYDRAMINE HYDROCHLORIDE 50 MG/ML
12.5 INJECTION INTRAMUSCULAR; INTRAVENOUS
Status: DISCONTINUED | OUTPATIENT
Start: 2024-10-08 | End: 2024-10-08 | Stop reason: HOSPADM

## 2024-10-08 RX ADMIN — ROCURONIUM BROMIDE 50 MG: 50 INJECTION, SOLUTION INTRAVENOUS at 09:45

## 2024-10-08 RX ADMIN — CEFAZOLIN 2 G: 1 INJECTION, POWDER, FOR SOLUTION INTRAMUSCULAR; INTRAVENOUS at 09:39

## 2024-10-08 RX ADMIN — ACETAMINOPHEN 1000 MG: 500 TABLET ORAL at 08:48

## 2024-10-08 RX ADMIN — FENTANYL CITRATE 100 MCG: 50 INJECTION, SOLUTION INTRAMUSCULAR; INTRAVENOUS at 09:45

## 2024-10-08 RX ADMIN — SODIUM CHLORIDE, POTASSIUM CHLORIDE, SODIUM LACTATE AND CALCIUM CHLORIDE: 600; 310; 30; 20 INJECTION, SOLUTION INTRAVENOUS at 09:00

## 2024-10-08 RX ADMIN — GLYCOPYRROLATE 0.3 MG: 0.2 INJECTION INTRAMUSCULAR; INTRAVENOUS at 10:00

## 2024-10-08 RX ADMIN — PHENAZOPYRIDINE 200 MG: 200 TABLET ORAL at 08:48

## 2024-10-08 RX ADMIN — MIDAZOLAM HYDROCHLORIDE 2 MG: 2 INJECTION, SOLUTION INTRAMUSCULAR; INTRAVENOUS at 09:45

## 2024-10-08 RX ADMIN — LIDOCAINE HYDROCHLORIDE 100 MG: 20 INJECTION, SOLUTION EPIDURAL; INFILTRATION; INTRACAUDAL at 09:45

## 2024-10-08 RX ADMIN — SCOPOLAMINE 1 PATCH: 1.5 PATCH, EXTENDED RELEASE TRANSDERMAL at 08:48

## 2024-10-08 RX ADMIN — ONDANSETRON 4 MG: 2 INJECTION INTRAMUSCULAR; INTRAVENOUS at 09:45

## 2024-10-08 RX ADMIN — PROPOFOL 150 MG: 10 INJECTION, EMULSION INTRAVENOUS at 09:45

## 2024-10-08 RX ADMIN — DEXAMETHASONE SODIUM PHOSPHATE 4 MG: 4 INJECTION INTRA-ARTICULAR; INTRALESIONAL; INTRAMUSCULAR; INTRAVENOUS; SOFT TISSUE at 09:45

## 2024-10-08 ASSESSMENT — PAIN DESCRIPTION - PAIN TYPE
TYPE: SURGICAL PAIN

## 2024-10-08 ASSESSMENT — FIBROSIS 4 INDEX: FIB4 SCORE: 0.89

## 2024-10-09 ENCOUNTER — TELEPHONE (OUTPATIENT)
Dept: OBGYN | Facility: CLINIC | Age: 69
End: 2024-10-09
Payer: MEDICARE

## 2024-10-09 ENCOUNTER — TELEPHONE (OUTPATIENT)
Dept: GYNECOLOGY | Facility: CLINIC | Age: 69
End: 2024-10-09
Payer: MEDICARE

## 2024-10-10 ENCOUNTER — NON-PROVIDER VISIT (OUTPATIENT)
Dept: OBGYN | Facility: CLINIC | Age: 69
End: 2024-10-10
Payer: MEDICARE

## 2024-10-15 ENCOUNTER — PATIENT MESSAGE (OUTPATIENT)
Dept: GYNECOLOGY | Facility: CLINIC | Age: 69
End: 2024-10-15
Payer: MEDICARE

## 2024-10-25 PROCEDURE — RXMED WILLOW AMBULATORY MEDICATION CHARGE: Performed by: INTERNAL MEDICINE

## 2024-10-26 ENCOUNTER — PHARMACY VISIT (OUTPATIENT)
Dept: PHARMACY | Facility: MEDICAL CENTER | Age: 69
End: 2024-10-26
Payer: COMMERCIAL

## 2024-10-26 RX ORDER — INFLUENZA A VIRUS A/VICTORIA/4897/2022 IVR-238 (H1N1) ANTIGEN (FORMALDEHYDE INACTIVATED), INFLUENZA A VIRUS A/CALIFORNIA/122/2022 SAN-022 (H3N2) ANTIGEN (FORMALDEHYDE INACTIVATED), AND INFLUENZA B VIRUS B/MICHIGAN/01/2021 ANTIGEN (FORMALDEHYDE INACTIVATED) 60; 60; 60 UG/.5ML; UG/.5ML; UG/.5ML
INJECTION, SUSPENSION INTRAMUSCULAR
Qty: 0.5 ML | Refills: 0 | OUTPATIENT
Start: 2024-10-26

## 2024-10-31 ENCOUNTER — APPOINTMENT (OUTPATIENT)
Dept: SLEEP MEDICINE | Facility: MEDICAL CENTER | Age: 69
End: 2024-10-31
Attending: NURSE PRACTITIONER
Payer: MEDICARE

## 2024-10-31 DIAGNOSIS — G47.33 OSA (OBSTRUCTIVE SLEEP APNEA): ICD-10-CM

## 2024-10-31 PROCEDURE — G0400 HOME SLEEP TEST/TYPE 4 PORTA: HCPCS | Performed by: STUDENT IN AN ORGANIZED HEALTH CARE EDUCATION/TRAINING PROGRAM

## 2024-11-06 NOTE — PROCEDURES
DIAGNOSTIC HOME SLEEP TEST (HST) REPORT WatchPAT      PATIENT ID:  NAME:  Mari Kinsey  MRN:               [id number]  YOB: 1955  DATE OF STUDY: 10/31/2024      Impression:     This study shows evidence of:      1. Mild obstructive sleep apnea with 4% PAT apnea hypopnea index(pAHI) of 8.8 per hour.  PAT respiratory disturbance index (pRDI) was 18.9 per hour. These findings are based on 7 channels recording of PAT signal with sleep staging, heart rate, pulse oximetry, actigraphy, body position, snoring and respiratory movement.     2. Oxygenation O2 Sat. mean O2 sat was 91%,  consuelo was 86%,  and maximum O2 at 97 %. O2 sat was at or  below 88% for 6.5 min of evaluation time. Oxygen Desaturation (>=4%) Index was 8.3/hr. AVG HR was 55 BPM.      TECHNICAL DESCRIPTION: Patient underwent home sleep apnea testing with peripheral arterial tone signal (WatchPAT™). This is a Type IV portable monitor and device per Medicare. Monitoring was done with 7 channels recording of PAT signal with sleep staging, heart rate, pulse oximetry, actigraphy, body position, snoring and respiratory movement. Prior to using the device, the patient received verbal and written instructions for its application and was provided with the help desk phone number for additional telephonic instruction with 24-hour availability of qualified personnel to answer questions.    Respiratory events:      General sleep summary: . Total recording time is 8 hours and 1 minutes and total Sleep time is 7 hours and 24 minutes. The patient spent 30 minutes in the supine position and 415 minutes in the nonsupine position.      Recommendations:    1. CPAP titration study vs Auto CPAP trial.   2. In general patients with sleep apnea are advised to avoid alcohol and sedatives and to not operate a motor vehicle while drowsy. In some cases alternative treatment options may prove effective in resolving sleep apnea in these options include upper  airway surgery, the use of a dental orthotic or weight loss and positional therapy. Clinical correlation is required.         Pan Childs MD

## 2024-11-18 NOTE — PROGRESS NOTES
Urogynecology & Reconstructive Pelvic Surgery -follow-up    Mari Kinsey MRN:[id number] :1955    Referred by: Crista Wallace DO    Reason for Visit:   Chief Complaint   Patient presents with    Post-op     Surgery 10/8/24         Subjective     History of Presenting Illness:    Mari Kinsey is a 69 y.o. P1 with history of prolapse who presents for follow-up 6 weeks s/p LAVH/BSO, uterosacral suspension, anterior/posterior repair with perineorrhaphy     She reports having an excellent recovery, mostly with perineal discomfort but this has improved with time.  Minimal bleeding.  She feels much better with her prolapse elevated.  She does not report any new urinary incontinence and feels like she empties her bladder better.  She is overall happy with results    HPI: She presents for the evaluation and management of prolapse.     She was told she had mild prolapse many years ago, and slowly started to become bothersome.  Is now protruding further outside the vagina, noted is a grade 3 cystocele by Dr. Wallace.  She has tried a pessary before but this was uncomfortable and fell out    She is recently s/p hysteroscopy/polypectomy (benign).    Prior Pelvic surgery:    hysteroscopic myomectomy   hysteroscopy (benign)  10/8/24: LAVH/BSO, uterosacral suspension, anterior/posterior repair with perineorrhaphy ([facility])     Prior treatment:   Vesy/Pfilates (Dr Perez.   Pessary - very uncomfortable     Pelvic floor symptom review:     Bladder:   Voids per day: 6 Voids per night: 1      Urinary incontinence episodes per day: none    Bladder emptying: Complete   Voiding symptoms: None   UTI in last 12 months: no   Other urologic history: no      Prolapse:     Prolapse symptoms: resolved     Bowel:    Hemorrhoids      Sexual function:    Sexually active: No  - partner issues.    Gender of partners: Male   Pain with intercourse: No       Pelvic Pain: no      Past medical and surgical history    Past  obstetric history   Number of vaginal deliveries: 1   Number of  deliveries: 0   History of vacuum/forceps: No    History of obstetric anal sphincter injury: No     Past gynecological history:    Last menstrual period: No LMP recorded. Patient has had a hysterectomy.   History of abnormal uterine bleeding: s/p polypectomy (benign)   History of fibroids: No    History of endometrial polyps:  Yes   History of endometriosis: No    History of cervical dysplasia: No       Past medical history:  Past Medical History:   Diagnosis Date    Cancer (HCC)     Chickenpox     Dental disorder     upper bridge    Greek measles     Gynecological disorder     Had D&C    Hypothyroidism     Influenza     Mumps     ALLEN (obstructive sleep apnea)     Sleep apnea     cpap    Snoring     sleep study done    Thyroid disease     Tonsillitis      Past surgical history:  Past Surgical History:   Procedure Laterality Date    LAPAROSCOPIC ASSISTED VAGINAL HYSTERECTOMY  10/8/2024    Procedure: LAPAROSCOPIC ASSISTED VAGINAL HYSTERECTOMY, BILATERAL SALPINGO-OOPHORECTOMY, VAULT SUSPENSION, ANTERIOR REPAIR, POSTERIOR REPAIR, PERINEORRHAPHY, CYSTOSCOPY AND ALL INDICATED PROCEDURES;  Surgeon: Magdalene Sorto M.D.;  Location: SURGERY SAME DAY [facility];  Service: Gynecology    VAGINAL SUSPENSION  10/8/2024    Procedure: COLPOPEXY;  Surgeon: Magdalene Sorto M.D.;  Location: SURGERY SAME DAY [facility];  Service: Gynecology    HYSTEROSCOPY WITH MYOSURE N/A 2024    Procedure: HYSTEROSCOPY, DILATION AND CURETTAGE;  Surgeon: Crista Wallace D.O.;  Location: SURGERY SAME DAY [facility];  Service: Gynecology    DILATION AND CURETTAGE N/A 2024    Procedure: DILATION AND CURETTAGE;  Surgeon: Crista Wallace D.O.;  Location: SURGERY SAME DAY [facility];  Service: Gynecology    KALE BY LAPAROSCOPY  2016    Procedure: KALE BY LAPAROSCOPY;  Surgeon: Tricia Farah M.D.;  Location: SURGERY SAME DAY [facility];  Service:     LYMPH NODE  SAMPLING  02/03/2016    Procedure: LYMPH NODE SAMPLING EXCISION, BIOPSY;  Surgeon: Tricia Farah M.D.;  Location: SURGERY SAME DAY [facility];  Service:     BONE GRAFT      at 19 years of age    BUNIONECTOMY      HYSTEROSCOPY WITH VIDEO DIAGNOSTIC      NASAL SEPTAL RECONSTRUCTION      OTHER      bone graft to ;hand    OTHER ABDOMINAL SURGERY  2019?    Removed gall bladder    OTHER ORTHOPEDIC SURGERY  1973    Bone graft from hip to 5th metacarpal (only cartilage there)    TONSILLECTOMY       Medications:has a current medication list which includes the following prescription(s): fluzone high-dose, levothyroxine, magnesium glycinate, estradiol, multiple vitamins-minerals, multiple vitamins-minerals, and vitamin d.  Allergies:Penicillins  Family history:  Family History   Problem Relation Age of Onset    Arthritis Mother         osteoporosis    Hypertension Mother     Hyperlipidemia Mother     Heart Disease Father         Heart murmur    Lung Disease Father         Emphysema (longtime smoker)    No Known Problems Sister     No Known Problems Sister     Alcohol abuse Sister     No Known Problems Brother     No Known Problems Brother     No Known Problems Brother     Sleep Apnea Neg Hx      Social history: reports that she has never smoked. She has been exposed to tobacco smoke. She has never used smokeless tobacco. She reports that she does not drink alcohol and does not use drugs.    Review of systems: A full review of systems was performed, and negative with the exception of want is noted above in the HPI.        Objective        BP (!) 148/57 (BP Location: Left arm, Patient Position: Sitting, BP Cuff Size: Adult)   Pulse 61     Physical Exam  Vitals reviewed. Exam conducted with a chaperone present (MA - see notes.).   Constitutional:       Appearance: Normal appearance.   HENT:      Head: Normocephalic.      Mouth/Throat:      Mouth: Mucous membranes are moist.   Cardiovascular:      Rate and Rhythm: Normal  rate.   Pulmonary:      Effort: Pulmonary effort is normal.   Abdominal:      Palpations: Abdomen is soft. There is no mass.      Tenderness: There is no abdominal tenderness.   Skin:     General: Skin is warm and dry.   Neurological:      Mental Status: She is alert.   Psychiatric:         Mood and Affect: Mood normal.         Genitourinary:    Vulva: petechiae   Bulbocavernosus reflex: Intact   Anal wink reflex: Intact   Perineal sensation: WNL   Urethra: Hypermobile --> improved   Vagina: Atrophic -well-healed suture lines and cuff, uterosacral sutures still intact   Atrophy: Mild   Cough stress test: Negative    Pelvic floor:    POP-Q: Postop Aa -2.5 / Ba -2.5 / TVL 8 / C -6.5 / D -3 / Ap -3 / Bp -3 / GH 3 / PB 3       Procedure Performed: none      Diagnostic test and records review:    Post-void residual: Postop 29 mL by cath    Labs:     Surgical pathology 10/8/2024:  Uterus, bilateral fallopian tubes, bilateral ovaries:          Atrophic endometrium with benign polyps.          Myometrium with leiomyomata, partially calcified.          Cervix with moderate chronic inflammation and squamous           metaplasia.          Bilateral benign fimbriated fallopian tubes, the left with a           paratubal cyst.          Bilateral ovaries with no diagnostic abnormality.     Radiology: n/a    Procedural: n/a    Documentation reviewed: Prior EMR Records         Assessment & Plan     Mari Kinsey is a 69 y.o. P1 with Medicare vaginal prolapse.     H/o prolapse, s/p LAVH/BSO/USLS/APR  - s/p pelvic PT ([facility])  - Doing very well, excellent support after surgery correction, may return to all normal activities, all questions answered.  - She was counseled that she no longer needs routine GYN/pelvic examinations, can follow-up with her primary care.  Any future pelvic issues can likely be treated by a urogyn , she may message or call with any issues    5. Genitourinary syndrome of menopause  Resume vaginal  estrogen, 1-x weekly.     Follow-up as needed             Magdalene Sorto MD, FACOG  Urogynecology and Reconstructive Pelvic Surgery  Department of Obstetrics and Gynecology  [facility]        This medical record contains text that has been entered with the assistance of computer voice recognition and dictation software.  Therefore, it may contain unintended errors in text, spelling, punctuation, or grammar

## 2024-11-19 ENCOUNTER — GYNECOLOGY VISIT (OUTPATIENT)
Dept: GYNECOLOGY | Facility: CLINIC | Age: 69
End: 2024-11-19
Payer: MEDICARE

## 2024-11-19 VITALS — HEART RATE: 61 BPM | DIASTOLIC BLOOD PRESSURE: 57 MMHG | SYSTOLIC BLOOD PRESSURE: 148 MMHG

## 2024-11-19 DIAGNOSIS — Z98.890 POSTOPERATIVE STATE: ICD-10-CM

## 2024-11-19 PROBLEM — N81.6 RECTOCELE: Status: RESOLVED | Noted: 2024-05-02 | Resolved: 2024-11-19

## 2024-11-19 PROBLEM — N81.12 CYSTOCELE, LATERAL: Status: RESOLVED | Noted: 2024-05-02 | Resolved: 2024-11-19

## 2024-11-19 PROBLEM — N84.0 ABNORMAL UTERINE BLEEDING DUE TO ENDOMETRIAL POLYP: Status: RESOLVED | Noted: 2024-05-02 | Resolved: 2024-11-19

## 2024-11-19 PROBLEM — N93.9 ABNORMAL UTERINE BLEEDING DUE TO ENDOMETRIAL POLYP: Status: RESOLVED | Noted: 2024-05-02 | Resolved: 2024-11-19

## 2024-11-19 PROBLEM — N81.2 INCOMPLETE UTEROVAGINAL PROLAPSE: Status: RESOLVED | Noted: 2024-05-02 | Resolved: 2024-11-19

## 2024-11-19 PROCEDURE — 99024 POSTOP FOLLOW-UP VISIT: CPT | Performed by: STUDENT IN AN ORGANIZED HEALTH CARE EDUCATION/TRAINING PROGRAM

## 2024-11-19 NOTE — PROGRESS NOTES
Patient here for Post Op Exam  Surgery Date: 10/8/2024   PVR :  29 mL  Good #: [phone]   Pharmacy Verified

## 2024-11-26 ENCOUNTER — TELEPHONE (OUTPATIENT)
Dept: HEALTH INFORMATION MANAGEMENT | Facility: OTHER | Age: 69
End: 2024-11-26
Payer: MEDICARE

## 2025-01-19 SDOH — ECONOMIC STABILITY: INCOME INSECURITY: HOW HARD IS IT FOR YOU TO PAY FOR THE VERY BASICS LIKE FOOD, HOUSING, MEDICAL CARE, AND HEATING?: NOT HARD AT ALL

## 2025-01-19 SDOH — ECONOMIC STABILITY: FOOD INSECURITY: WITHIN THE PAST 12 MONTHS, THE FOOD YOU BOUGHT JUST DIDN'T LAST AND YOU DIDN'T HAVE MONEY TO GET MORE.: NEVER TRUE

## 2025-01-19 SDOH — ECONOMIC STABILITY: FOOD INSECURITY: WITHIN THE PAST 12 MONTHS, YOU WORRIED THAT YOUR FOOD WOULD RUN OUT BEFORE YOU GOT MONEY TO BUY MORE.: NEVER TRUE

## 2025-01-19 SDOH — ECONOMIC STABILITY: INCOME INSECURITY: IN THE LAST 12 MONTHS, WAS THERE A TIME WHEN YOU WERE NOT ABLE TO PAY THE MORTGAGE OR RENT ON TIME?: NO

## 2025-01-19 SDOH — HEALTH STABILITY: PHYSICAL HEALTH: ON AVERAGE, HOW MANY MINUTES DO YOU ENGAGE IN EXERCISE AT THIS LEVEL?: 30 MIN

## 2025-01-19 SDOH — HEALTH STABILITY: PHYSICAL HEALTH: ON AVERAGE, HOW MANY DAYS PER WEEK DO YOU ENGAGE IN MODERATE TO STRENUOUS EXERCISE (LIKE A BRISK WALK)?: 4 DAYS

## 2025-01-19 ASSESSMENT — LIFESTYLE VARIABLES
SKIP TO QUESTIONS 9-10: 1
HOW OFTEN DO YOU HAVE SIX OR MORE DRINKS ON ONE OCCASION: NEVER
HOW OFTEN DO YOU HAVE A DRINK CONTAINING ALCOHOL: NEVER
HOW MANY STANDARD DRINKS CONTAINING ALCOHOL DO YOU HAVE ON A TYPICAL DAY: PATIENT DOES NOT DRINK
AUDIT-C TOTAL SCORE: 0

## 2025-01-19 ASSESSMENT — SOCIAL DETERMINANTS OF HEALTH (SDOH)
IN THE PAST 12 MONTHS, HAS THE ELECTRIC, GAS, OIL, OR WATER COMPANY THREATENED TO SHUT OFF SERVICE IN YOUR HOME?: NO
DO YOU BELONG TO ANY CLUBS OR ORGANIZATIONS SUCH AS CHURCH GROUPS UNIONS, FRATERNAL OR ATHLETIC GROUPS, OR SCHOOL GROUPS?: YES
HOW HARD IS IT FOR YOU TO PAY FOR THE VERY BASICS LIKE FOOD, HOUSING, MEDICAL CARE, AND HEATING?: NOT HARD AT ALL
HOW OFTEN DO YOU HAVE SIX OR MORE DRINKS ON ONE OCCASION: NEVER
HOW MANY DRINKS CONTAINING ALCOHOL DO YOU HAVE ON A TYPICAL DAY WHEN YOU ARE DRINKING: PATIENT DOES NOT DRINK
HOW OFTEN DO YOU GET TOGETHER WITH FRIENDS OR RELATIVES?: THREE TIMES A WEEK
HOW OFTEN DO YOU GET TOGETHER WITH FRIENDS OR RELATIVES?: THREE TIMES A WEEK
WITHIN THE PAST 12 MONTHS, YOU WORRIED THAT YOUR FOOD WOULD RUN OUT BEFORE YOU GOT THE MONEY TO BUY MORE: NEVER TRUE
HOW OFTEN DO YOU ATTEND CHURCH OR RELIGIOUS SERVICES?: MORE THAN 4 TIMES PER YEAR
IN A TYPICAL WEEK, HOW MANY TIMES DO YOU TALK ON THE PHONE WITH FAMILY, FRIENDS, OR NEIGHBORS?: MORE THAN THREE TIMES A WEEK
HOW OFTEN DO YOU HAVE A DRINK CONTAINING ALCOHOL: NEVER
DO YOU BELONG TO ANY CLUBS OR ORGANIZATIONS SUCH AS CHURCH GROUPS UNIONS, FRATERNAL OR ATHLETIC GROUPS, OR SCHOOL GROUPS?: YES
HOW OFTEN DO YOU ATTENT MEETINGS OF THE CLUB OR ORGANIZATION YOU BELONG TO?: MORE THAN 4 TIMES PER YEAR
IN A TYPICAL WEEK, HOW MANY TIMES DO YOU TALK ON THE PHONE WITH FAMILY, FRIENDS, OR NEIGHBORS?: MORE THAN THREE TIMES A WEEK
HOW OFTEN DO YOU ATTEND CHURCH OR RELIGIOUS SERVICES?: MORE THAN 4 TIMES PER YEAR
HOW OFTEN DO YOU ATTENT MEETINGS OF THE CLUB OR ORGANIZATION YOU BELONG TO?: MORE THAN 4 TIMES PER YEAR

## 2025-01-20 ENCOUNTER — OFFICE VISIT (OUTPATIENT)
Dept: MEDICAL GROUP | Facility: MEDICAL CENTER | Age: 70
End: 2025-01-20
Payer: MEDICARE

## 2025-01-20 ENCOUNTER — TELEPHONE (OUTPATIENT)
Dept: MEDICAL GROUP | Facility: MEDICAL CENTER | Age: 70
End: 2025-01-20

## 2025-01-20 VITALS
HEIGHT: 63 IN | WEIGHT: 187 LBS | DIASTOLIC BLOOD PRESSURE: 74 MMHG | TEMPERATURE: 97.8 F | HEART RATE: 78 BPM | BODY MASS INDEX: 33.13 KG/M2 | SYSTOLIC BLOOD PRESSURE: 110 MMHG | OXYGEN SATURATION: 98 %

## 2025-01-20 DIAGNOSIS — C81.96 HODGKIN LYMPHOMA OF INTRAPELVIC LYMPH NODES, UNSPECIFIED HODGKIN LYMPHOMA TYPE (HCC): ICD-10-CM

## 2025-01-20 DIAGNOSIS — R73.09 IMPAIRED GLUCOSE METABOLISM: ICD-10-CM

## 2025-01-20 DIAGNOSIS — Z90.710 H/O VAGINAL HYSTERECTOMY: ICD-10-CM

## 2025-01-20 DIAGNOSIS — M81.0 POSTMENOPAUSAL BONE LOSS: ICD-10-CM

## 2025-01-20 DIAGNOSIS — E55.9 VITAMIN D DEFICIENCY: ICD-10-CM

## 2025-01-20 DIAGNOSIS — E66.811 OBESITY (BMI 30.0-34.9): ICD-10-CM

## 2025-01-20 DIAGNOSIS — Z00.00 PREVENTATIVE HEALTH CARE: Chronic | ICD-10-CM

## 2025-01-20 DIAGNOSIS — G47.33 OBSTRUCTIVE SLEEP APNEA SYNDROME: ICD-10-CM

## 2025-01-20 DIAGNOSIS — Z12.31 ENCOUNTER FOR SCREENING MAMMOGRAM FOR BREAST CANCER: ICD-10-CM

## 2025-01-20 DIAGNOSIS — R31.29 MICROSCOPIC HEMATURIA: ICD-10-CM

## 2025-01-20 DIAGNOSIS — E78.5 DYSLIPIDEMIA: Chronic | ICD-10-CM

## 2025-01-20 PROBLEM — I27.20 PULMONARY HYPERTENSION (HCC): Status: RESOLVED | Noted: 2023-04-17 | Resolved: 2025-01-20

## 2025-01-20 PROBLEM — N95.8 GENITOURINARY SYNDROME OF MENOPAUSE: Status: RESOLVED | Noted: 2024-05-02 | Resolved: 2025-01-20

## 2025-01-20 PROCEDURE — G0439 PPPS, SUBSEQ VISIT: HCPCS | Performed by: INTERNAL MEDICINE

## 2025-01-20 ASSESSMENT — FIBROSIS 4 INDEX: FIB4 SCORE: 0.9

## 2025-01-20 ASSESSMENT — PATIENT HEALTH QUESTIONNAIRE - PHQ9: CLINICAL INTERPRETATION OF PHQ2 SCORE: 0

## 2025-01-20 ASSESSMENT — ENCOUNTER SYMPTOMS: GENERAL WELL-BEING: GOOD

## 2025-01-20 ASSESSMENT — ACTIVITIES OF DAILY LIVING (ADL): BATHING_REQUIRES_ASSISTANCE: 0

## 2025-01-20 NOTE — LETTER
[facility]  Kenneth Reinoso M.D.  [address]  [place] [address]  Fax: [phone]   Authorization for Release/Disclosure of   Protected Health Information   Name: FLORENCIO HAMILTON : 1955 SSN: [id number]   Address: [address] [place] [address]  [place] [address] Phone:    There are no phone numbers on file.   I authorize the entity listed below to release/disclose the PHI below to:   [facility]/Kenneth Reinoso M.D. and Kenneth Reinoso M.D.   Provider or Entity Name:  [facility]   City, State, Zip   Phone:      Fax:     Reason for request: continuity of care   Information to be released:    [  ] LAST COLONOSCOPY,  including any PATH REPORT and follow-up  [  ] LAST FIT/COLOGUARD RESULT [  ] LAST DEXA  [  ] LAST MAMMOGRAM  [  ] LAST PAP  [  ] LAST LABS [ X ] RETINA EXAM REPORT  [  ] IMMUNIZATION RECORDS  [  ] Release all info      [  ] Check here and initial the line next to each item to release ALL health information INCLUDING  _____ Care and treatment for drug and / or alcohol abuse  _____ HIV testing, infection status, or AIDS  _____ Genetic Testing    DATES OF SERVICE OR TIME PERIOD TO BE DISCLOSED: _____________  I understand and acknowledge that:  * This Authorization may be revoked at any time by you in writing, except if your health information has already been used or disclosed.  * Your health information that will be used or disclosed as a result of you signing this authorization could be re-disclosed by the recipient. If this occurs, your re-disclosed health information may no longer be protected by State or Federal laws.  * You may refuse to sign this Authorization. Your refusal will not affect your ability to obtain treatment.  * This Authorization becomes effective upon signing and will  on (date) __________.      If no date is indicated, this Authorization will  one (1) year from the signature date.    Name: Florencio Hamilton  Signature: continuity  of care Date:   1/23/2025     PLEASE FAX REQUESTED RECORDS BACK TO: [phone]

## 2025-01-20 NOTE — LETTER
[employer]  Kenneth Reinoso M.D.  [address]  [place] [address]  Fax: [phone]   Authorization for Release/Disclosure of   Protected Health Information   Name: FLORENCIO HAMILTON : 1955 SSN: [id number]   Address: [address] [place] [address]  [place] [address] Phone:    There are no phone numbers on file.   I authorize the entity listed below to release/disclose the PHI below to:   [facility]/Kenneth Reinoso M.D. and Kenneth Reinoso M.D.   Provider or Entity Name:  [facility]    Address   City, State, [facility]   Phone:      Fax:     Reason for request: continuity of care   Information to be released: records from last 12 months.   [  ] LAST COLONOSCOPY,  including any PATH REPORT and follow-up  [  ] LAST FIT/COLOGUARD RESULT [  ] LAST DEXA  [  ] LAST MAMMOGRAM  [  ] LAST PAP  [  ] LAST LABS [  ] RETINA EXAM REPORT  [  ] IMMUNIZATION RECORDS  [ X ] Release all info      [  ] Check here and initial the line next to each item to release ALL health information INCLUDING  _____ Care and treatment for drug and / or alcohol abuse  _____ HIV testing, infection status, or AIDS  _____ Genetic Testing    DATES OF SERVICE OR TIME PERIOD TO BE DISCLOSED: _____________  I understand and acknowledge that:  * This Authorization may be revoked at any time by you in writing, except if your health information has already been used or disclosed.  * Your health information that will be used or disclosed as a result of you signing this authorization could be re-disclosed by the recipient. If this occurs, your re-disclosed health information may no longer be protected by State or Federal laws.  * You may refuse to sign this Authorization. Your refusal will not affect your ability to obtain treatment.  * This Authorization becomes effective upon signing and will  on (date) __________.      If no date is indicated, this Authorization will  one (1) year from the signature date.    Name:  Mari Kinsey  Signature: continuity of care Date:   1/23/2025     PLEASE FAX REQUESTED RECORDS BACK TO: [phone]

## 2025-01-20 NOTE — PROGRESS NOTES
Subjective     Mari Kinsey is a 69 y.o. female who presents with medicare wellness    HPI      Medicare wellness  Current supplements: Reviewed  Chronic narcotic pain medicines: No  Allergies:  reviewed  Sees  oncology   Sees  dermatology  Sees  eye   Dental cleaning twice per year  Patient has been keeping active exercising usually six days per week 30 to 45 minutes at a time, tries to eat healthy limiting sweets and fried foods, over the holidays had been eating, no sodas, no etoh, does drink water and green tea  Has not been weight training after recent surgery but has started to walk again, previously had been going to the gym but took a break over the holidays, previously when exercising did not have any knee pain with activity    Recent records reviewed  2/14/24  gynecology note postmenopausal bleeding, ultrasound ordered  2/16/24 ultrasound gynecologic endometrial stripe 5.9 mm considered thickened, 3 small uterine masses likely fibroids  3/7/24  gynecology note endometrial biopsy for thickened endometrium  4/3/24 dr.mckenzie moreau gynecology procedure note hysteroscopy, dilatation and curettage  4/15/24 dr.mckenzie moreau gynecology 2 weeks follow-up hysteroscopy, D&C with polypectomy, normal pathology  10/8/24  urogynecology operative note laparoscopic vaginal hysterectomy, BSO, vaginal colpopexy, anterior posterior repair  11/19/24  urogynecology note no longer needs routine gynecologic evaluation, resume vaginal estrogen as needed    9/25/24 sleep note on cpap   10/31/24 home sleep study test, mild obstructive sleep apnea pAHI 8.8 per horu and pRDI 18.9 per hour mean saturation 91%, consuelo 86%, time less than 88% saturation 6.5 minutes, recommend CPAP versus AutoPap titration      Screening: Reviewed  Depressive Symptoms: Denies feeling down, depressed or hopeless. Denies loss of interest or pleasure in doing things   ADLs: Denies  needing help with using telephone, transportation, shopping, preparing meals, housework, laundry, or managing medication or money.    Independent with bathing, hygiene, feeding, toileting, dressing    Memory concerns: Denies difficulty remembering details of conversations, events and upcoming appointments.  Driving is fine, no night driving issues  Hearing problems: Denies.   Recent falls no  Volunteers assistance league twice weekly, reads to 2 year old kids once per week       Current Outpatient Medications   Medication Sig Dispense Refill    influenza vaccine High-Dose (FLUZONE HIGH-DOSE) 0.5 ML Suspension Prefilled Syringe injection Inject  into the shoulder, thigh, or buttocks. 0.5 mL 0    levothyroxine (SYNTHROID) 125 MCG Tab Frequency change to one pill 6 days per week, two pills on day 7 so a total of 8 tabs every 7 days  Indications: low thyroid 96 Tablet 2    Magnesium Glycinate 100 MG Cap Take 200 mg by mouth every day.          **MEDICATION INSTRUCTIONS FOR SURGERY/PROCEDURE SCHEDULED FOR 10/8/2024   DO NOT TAKE FOR 7 DAYS PRIOR TO SURGERY      estradiol (ESTRACE VAGINAL) 0.1 MG/GM vaginal cream Apply 1g cream inside vagina twice per week (Patient taking differently: Apply 1g cream inside vagina twice per week         **MEDICATION INSTRUCTIONS FOR SURGERY/PROCEDURE SCHEDULED FOR 10/8/2024   DO NOT TAKE MORNING OF SURGERY.) 1 Each 6    Multiple Vitamins-Minerals (CENTRUM MINIS WOMEN 50+ PO) Take  by mouth every day.         **MEDICATION INSTRUCTIONS FOR SURGERY/PROCEDURE SCHEDULED FOR 10/8/2024   DO NOT TAKE FOR 7 DAYS PRIOR TO SURGERY      Multiple Vitamins-Minerals (PRESERVISION AREDS 2 PO) Take  by mouth every day.         **MEDICATION INSTRUCTIONS FOR SURGERY/PROCEDURE SCHEDULED FOR 10/8/2024   DO NOT TAKE FOR 7 DAYS PRIOR TO SURGERY      vitamin D (CHOLECALCIFEROL) 1000 UNIT Tab Take 2,000 Units by mouth every day.         **MEDICATION INSTRUCTIONS FOR SURGERY/PROCEDURE SCHEDULED FOR 10/8/2024   DO  NOT TAKE FOR 7 DAYS PRIOR TO SURGERY       No current facility-administered medications for this visit.            s/p bunion surgery left foot  Sees podiatry   12/11 podiatry note  1/13 bunion surgery      Sleep apnea  2/11 [facility] sleep study moderate obstructive sleep apnea syndrome with apnea-hypotony index 28, low saturation 83%, successful CPAP titration final pressure 9 cm with improvement in apnea-hypotony index to 1.5 with low saturation 91%  3/11 [facility] note on cpap 9  3/12 [facility] note on cpap 9  12/12 off cpap  12/14 back on cpap 9  12/20/15 on cpap 9  10/10/17sleep note continue cpap 9  11/20/17 echo normal LV size and function, EF 70%, mild tricuspid regurgitation, RVSP 35  10/19/18 sleep note on cpap 9  10/21/19 sleep note on cpap 9   10/21/20 sleep note on cpap 9  5/4/21 sleep note on cpap 9  5/9/22 sleep note continue cpap 9  11/19/22 to 12/18/22 resmed airview compliance report average use 5 hours 33 minutes on cpap 9, 90% compliance, mask leak 2.9 l/min  12/19/22 sleep note compliance report 93% compliance, mask leak 0.4, continues on cpap 9  9/8/23 sleep note on cpap 9 compliance 93%, AHI 0.4, follow-up 1 year     Skin lesion followed by dermatology Dr. Mondragon     Sensorineural hearing loss  1/9/24 [facility] audiology note mild to moderate severe high-frequency hearing loss     Preventative health  8/4/17 completed hep b vaccine series   8/4/17 completed hep b vaccine series   11/26/19 shingrix second at [facility]  3/22/21 colonoscopy per [facility] 3 mm benign-appearing sessile polyp pathology pending  4/9/21 [facility] telephone note, pathology from the terminal ileum and 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, 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, will follow up with    2/1/22 dexa LS-0.6,hip+0.7  3/14/22 tdap  11/14/22 prevnar   8/15/23 mammogram  9/19/23 rsv  2/13/24 vit d 53  9/26/24 covid  10/26/24 flu     Microscopic hematuria  12/19/14 UA 2-5 RBC repeat in 1 month  1/21/15  UA trace blood   1/27/15 ultrasound renal negative  2/15 drop of urine one more time, send out UA evaluate microscopic hematuria , if still positive for urology  2/27/15 UA positive blood refer to urology  4/4/15 CT abdomen and pelvis with and without; retroperitoneal periaortic pericaval and retrocrural adenopathy, mesenteric adenopathy, no hydronephrosis or urolithiasis  10/16/15 urology note microscopic hematuria workup, CT urogram negative, cystoscopy bladder neck inflammation with cytology negative, hematuria has resolved; followup 6 months  4/13/16 [place] U[place] and [place]  12/28/18 UA   8/10/20 UA negative done at [facility]  2/13/24 UA rare blood     Macular drusen  12/20/18 referral to   2/13/19  eye exam macular degeneration, continue antioxidants and areds formula  7/7/20  eye exam  5/23/22  retina specialist macular drusen, posterior vitreous detachment, senile cataract  9/6/22 [facility] macular drusen    9/12/23  eye care professional note posterior vitreous detachment observation, glaucoma suspect  9/26/23 [facility] note  9/30/24 [facility] note     Impaired glucose metabolism  12/28/18  A1c 5.7%  12/31/19 A1c 5.7%  11/5/21 A1c 5.4%  12/16/22 A1c 6.2%  4/14/23 A1c 5.7%  2/13/24 A1c 5.3%     Hypothyroid  9/10 tsh 1.9 on levothyroxine 112 mcg  12/9/11 tsh 4.1 on levothyroxine 112 mcg; increase to levothyroxine 125 mcg  2/12 tsh 2.4 cont levothyroxine 125 mcg  1/13 tsh 3.5 on levothyroxine 125 mcg  2/28/14 tsh 3.9 on levothyroxine 125 mcg  12/19/14 tsh 3.8 on levothyroxine 125 mcg  12/24/15 tsh 2.0 on levothyroxine 125 mcg  12/22/16 tsh 3.3 on levothyroxine 125 mcg   12/29/17 tsh 2.6 on levothyroxine 125 mcg  12/28/18 tsh 6.5 on levothyroxine 125 mcg, change to levothyroxine 137 mcg, repeat tsh 6 weeks  2/14/19 tsh 2.8 on levothyroxine 137 mcg   12/31/19 tsh 9.3 on levothyroxine 137 mcg taking daily, change to 150 mcg daily repeat tsh in 6  weeks ordered  2/4/20 tsh 2.4 on levothyroxine 150 mcg  8/7/20 tsh 2.9 on levothyroxine 150 mcg [facility]  3/12/21 tsh 3.4 on levothyroxine 150 mcg done at [facility]  11/5/21 tsh 7.7 ?thyroid dose, patient has been drinking tea 10 minutes after thyroid medication, will stop tea for 30 minutes, repeat tsh 6 weeks   1/4/22 tsh 11.0 on levothyroxine 150 mcg change to 200 mcg   3/21/22 tsh 0.4,free t4 1.59 on 200 mcg daily   12/16/22 tsh 0.85 on levothyroxine 200 mcg qday  2/13/24 tsh 0.22 on levothyroxine 200 mcg daily will change to six days per week and repeat tsh 6-8 weeks ordered  4/10/24 tsh 0.06 on levothyroxine 200 mcg six days per week change to 125 mcg daily repeat tsh 6-8 weeks ordered  5/29/24 tsh 6.7 on levothyroxine 125 mcg daily change to levothyroxine 125 mcg one tab six days per week and two tabs day 7, repeat tsh 6 weeks  7/18/24 tsh 1.6 on levothyroxine 125 mcg one tab six days per week and two tabs day 7     history of Lymphoma  4/4/15 CT abdomen and pelvis with and without; retroperitoneal periaortic pericaval and retrocrural adenopathy, mesenteric adenopathy, no hydronephrosis or urolithiasis  12/11/15 ,,alk phos 170,bili 1.2   12/20/15 ultrasound liver pending, repeat liver enzymes and secondary workup pending, CT abdomen and pelvis ordered at urgent care pending follow-up lymphadenopathy  12/24/15 ultrasound abdomen and biliary tree, prominent liver size, no focal mass, gallbladder contains numerous mobile stones, no thickening or pericholecystic fluid is noted, maximum diameter common bile duct 9.8 which is enlarged, 2.6 x 2.2 x 2.6 cm nodule hypoechoic area in the pancreatic body not well visualized because of gas  12/24/15 AST 34,ALT 76,GGT 76.alk phos 88,bili 0.5,acute hep panel negative, iron 45,%sat 13,ferritin 67,AMA 22 (20-25 equivocal),F-actin Ab IgG negative,RE neg,SPEP neg  12/26/15 CT with contrast pending follow-up adenopathy on previous CT and possible pancreatic  lesion on ultrasound, referral to surgery for cholelithiasis.  1/12/16  surgery scheduled for laparoscopic cholecystectomy, laparoscopic biopsy of lymph nodes, conversion to open procedure to complete biopsy if necessary to rule out lymphoma  2/3/16 laparoscopic mesenteric lymph node biopsy limited sampling, partial involvement follicular lymphoma B-cell origin favor low-grade 1-2/3, flow cytometry CD10 monoclonal B cells, demonstrating lambda light chain expression and call expression of CD10 with CD19, CD20, negative for CD5 and CD43  2/19/16 CT/PET hypermetabolic activity within retrocrural, periaortic, aortocaval space, proximal pelvic adenopathy within the left common iliac chain, hypermetabolic adenopathy within mesentery  3/16/16  oncology consultation; 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 consistent with low-grade non-hodgkin's lymphoma follicular-type CD10 and CD20 positive, likely low-grade apparently indolent course, most likely will need observation, recommend complete workup including bone marrow biopsy, LDH, CMP,and once complete will calculate FLPI score  4/6/16  oncology note wbc 4.6,hgb 13.9,hct 43,plt 240; stage IIIa follicular lymphoma low-grade, recommend observation follow-up every 3 months first year and then every 6 months, CBC, CMP, LDH, beta-2 microglobulin and imaging studies as indicated, follow up 3 months  7/6/16  oncology note, wbc 7.7,hgb 13.6,hct 40,plt 304, ,beta 2 microglobulin 2.5 (1.1-2.4),uric 5.7;follow up 3 months  10/5/16  oncology note clinically no signs of recurrence, repeat CT chest, abdomen, pelvis in january, CMP, LDH today, follow-up 3 months  12/27/16 wbc 7.7 (49%N,30%L)  12/29/16 CT abdomen and pelvis with; interval resolution of retrocrural, para-aortic, paracaval, and mesenteric adenopathy, no adenopathy appreciated in pelvis, stable 2 cm or less diameter nodules right middle  lobe  1/17/17  oncology note, doing well with recent CT showing no evidence of adenopathy, repeat labs prior to next visit CBC, CMP, LDH, beta-2 microglobulin, follow-up 6 months  7/12/17 cbc,cmp normal,,beta 2 microglobulin  7/18/17  oncology note no evidence disease progression  12/29/17 wbc 4.3 (61%N,18%L)  1/16/18 dr.reganti chairez note no evidence of disease progression, recent labs cbc,cmp,ldh normal, follow-up 6 months  7/30/18  oncology note stable no recurrence of disease, labs ordered  1/28/19 dr.reganti chairez note repeat labs follow up 6 months  8/7/19 wbc 8.0,hgb 13,hct 43,,cmp negative  8/14/19 dr.reganti chairez note no clinical symptoms, laboratory work reviewed, follow-up 1 year  8/14/20 dr.reganti chairez note no signs of disease recurrence or progression, continue observation  3/22/21 colonoscopy per [facility] 3 mm benign-appearing sessile polyp pathology pending, pathology from the terminal ileum and 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, 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, will follow up with    5/12/21 dr.reganti chairez note reviewed recent pathology colonoscopy showing terminal ileum biopsies positive for follicular lymphoma, currently asymptomatic, CT scan and labs ordered, follow-up depending on results  6/4/21 CT chest, abdomen, pelvis with; stable mildly enlarged mesenteric lymph nodes compatible with low-grade lymphoma similar to CT on 12/29/16 but decreased compared to CT/PET 2/19/16 measuring up to 1.3 cm left mesentery, similar appearance of few small pulmonary nodules up to 4 mm left lower lobe (previously 4 mm on CT chest on 7/4/07)  12/16/21  oncology note no signs of disease progression, continue to monitor  5/5/22 dr.reganti chairez note stable, recommend repeat CT chest, abdomen, pelvis, labs ordered  6/28/22 CT chest, abdomen, pelvis per oncology no new abnormalities, similar appearance few  minimally enlarged intra-abdominal lymph nodes up to 1.1 cm (previously 1.3 cm)  7/6/22  oncology note no signs or symptoms of disease progression, continue routine surveillance follow-up   12/16/22 wbc 6.8  1/4/23  oncology note remains asymptomatic, follow-up 6 months  7/6/23  oncology note stable   1/17/24  oncology note stable no signs of progression or transformation, continue watch and wait approach, follow-up 6 months  7/26/24  oncology note clinically stable, repeat CT chest, abdomen, pelvis prior to next visit  12/3/24 CT chest, abdomen, pelvis per oncology, stable 9 mm right paratracheal lymph node (previously 9 mm), increasing size of the right periaortic lymph node measuring 2 cm (previously 1 cm), stable 1 cm left mesenteric lymph node (previously 1.1 cm)  12/17/24  oncology note clinically stable, continue surveillance     history of hypertension  7/9/20 start lisinopril 10 mg  8/7/20 on lisinopril 10 mg urine mac<30 [facility]  4/12/21 off lisinopril due to weight loss  4/22/22 CT calcium score total 0.0, incidental 4.5 nodule left lower lobe (6/4/21 CT chest, abdomen, pelvis with; similar appearance of few small pulmonary nodules up to 4 mm left lower lobe (previously 4 mm on CT chest on 7/4/07)     Dyslipidemia  9/10 chol 222,trig 144,hdl 48,ldl 144  12/9/11 chol 230,trig 106,hdl 55,ldl 154  1/13 chol 224,trig 109,hdl 46,ldl 156; 10 yr risk 4%  2/28/14 chol 226,trig 127,hdl 64,ldl 137  12/19/14 chol 205,trig 111,hdl 51,ldl 132  4/23/15 chol 212,trig 106,hdl 58,ldl 133  12/24/15 chol 212,trig 56,hdl 62,ldl 139  12/27/16 chol 231,trig 72,hdl 65,ldl 152  12/29/17 chol 208,trig 70,hdl 63,ldl 131  12/28/18 chol 219,trig 199,hdl 57,ldl 122  12/31/19 chol 221,trig 178,hdl 57,ldl 128; 10 year risk 5.3%  8/7/20 chol 233,trig 143,hdl 56,ldl 150 done at [facility], declines statin  3/12/21 chol 254,trig 121,hdl 63,ldl 166 done at [facility]; 10 year risk  5.4%, declined statin, will work on diet, exercise, repeat labs 6 months printed to mail to her  21 chol 213,trig 135,hdl 59,ldl 127  22 chol 223,trig 101,hdl 56,ldl 147, tsh 11 so will increase synthroid and repeat tsh 8 weeks  3/21/22 chol 241,trig 162,hdl 61,ldl 148  22 CT calcium score total 0.0   22 chol 212,trig 143,hdl 54,ldl 129   24 chol 212,trig 170,hdl 55,ldl 123      Deviated nasal septum     Allergic rhinitis  14  allergy note tolerating immunotherapy  8/1/15  allergy note on immunotherapy  16  allergy note allergic rhinitis tolerating immunotherapy  3/19/18  allergy note off immunotherapy x1 year, symptoms restarted this past winter, patient would like to restart immunotherapy  10/30/18  allergy note stable on immunotherapy  10/22/19  allergy note continue immunotherapy  19 monthly shots per   20 monthly shots per    21 stopped allergy shots last month once cat               Patient Active Problem List   Diagnosis    Hypothyroid    Preventative health care    S/p bunion surgery left foot    Deviated nasal septum    Dyslipidemia    Sleep apnea    Skin lesion    Family history of breast cancer in sister    Macular drusen    Obesity (BMI 30.0-34.9)    Microscopic hematuria    Hodgkin lymphoma (HCC)    Allergic rhinitis    History of hypertension    Impaired glucose metabolism    Atherosclerosis of aorta (HCC)    Pulmonary hypertension (HCC)    Sensorineural hearing loss    History of COVID-19    Genitourinary syndrome of menopause     Depression Screening  Little interest or pleasure in doing things?  0 - not at all  Feeling down, depressed , or hopeless? 0 - not at all  Patient Health Questionnaire Score: 0     If depressive symptoms identified deferred to follow up visit unless specifically addressed in assessment and plan.    Interpretation of PHQ-9 Total Score   Score Severity    1-4 No Depression   5-9 Mild Depression   10-14 Moderate Depression   15-19 Moderately Severe Depression   20-27 Severe Depression    Screening for Cognitive Impairment  Do you or any of your friends or family members have any concern about your memory?    Three Minute Recall (Leader, Season, Table) 3/3    Nakul clock face with all 12 numbers and set the hands to show 10 minutes after 11.  Yes    Cognitive concerns identified deferred for follow up unless specifically addressed in assessment and plan.    Fall Risk Assessment  Has the patient had two or more falls in the last year or any fall with injury in the last year?  No    Safety Assessment  Do you always wear your seatbelt?  Yes  Any changes to home needed to function safely? No  Difficulty hearing.  No  Patient counseled about all safety risks that were identified.    Functional Assessment ADLs  Are there any barriers preventing you from cooking for yourself or meeting nutritional needs?  No.    Are there any barriers preventing you from driving safely or obtaining transportation?  No.    Are there any barriers preventing you from using a telephone or calling for help?  No    Are there any barriers preventing you from shopping?  No.    Are there any barriers preventing you from taking care of your own finances?  No    Are there any barriers preventing you from managing your medications?  No    Are there any barriers preventing you from showering, bathing or dressing yourself? No    Are there any barriers preventing you from doing housework or laundry? No  Are there any barriers preventing you from using the toilet?No  Are you currently engaging in any exercise or physical activity?  No.      Self-Assessment of Health  What is your perception of your health? Good    Do you sleep more than six hours a night? Yes    In the past 7 days, how much did pain keep you from doing your normal work? None    Do you spend quality time with family or friends (virtually or  in person)? Yes    Do you usually eat a heart healthy diet that constists of a variety of fruits, vegetables, whole grains and fiber? Yes    Do you eat foods high in fat and/or Fast Food more than three times per week? No    How concerned are you that your medical conditions are not being well managed? Not at all    Are you worried that in the next 2 months, you may not have stable housing that you own, rent, or stay in as part of a household? No      Advance Care Planning  Do you have an Advance Directive, Living Will, Durable Power of , or POLST? Yes  Advance Directive       is on file      Health Maintenance Summary            Overdue - Mammogram (Yearly) Overdue since 8/14/2024 08/14/2023  MA-SCREENING MAMMO BILAT W/TOMOSYNTHESIS W/CAD    02/01/2022  MA-SCREENING MAMMO BILAT W/TOMOSYNTHESIS W/CAD    03/13/2019  [id number]    03/13/2019  MA-SCREENING MAMMO BILAT W/TOMOSYNTHESIS W/CAD    11/25/2016  [id number]    Only the first 5 history entries have been loaded, but more history exists.              Annual Wellness Visit (Yearly) Next due on 4/18/2025 04/18/2024  Level of Service: [place] ANNUAL WELLNESS VISIT-INCLUDES PPPS SUBSEQUE*    04/17/2023  Level of Service: [place] ANNUAL WELLNESS VISIT-INCLUDES PPPS SUBSEQUE*    11/14/2022  Visit Dx: Medicare annual wellness visit, subsequent    05/27/2022  Level of Service: [place] ANNUAL WELLNESS VISIT-INCLUDES PPPS SUBSEQUE*    11/08/2021  Visit Dx: Medicare annual wellness visit, subsequent    Only the first 5 history entries have been loaded, but more history exists.              Bone Density Scan (Every 5 Years) Next due on 2/1/2027 02/01/2022  DS-BONE DENSITY STUDY (DEXA)    12/29/2016  DS-BONE DENSITY STUDY (DEXA)              Colorectal Cancer Screening (Colonoscopy - Preferred) Next due on 3/22/2031      03/22/2021  REFERRAL TO GI FOR COLONOSCOPY    01/28/2020  OCCULT BLOOD FECES IMMUNOASSAY    01/14/2019  OCCULT BLOOD FECES  IMMUNOASSAY    11/17/2010  AMB REFERRAL TO GI FOR COLONOSCOPY              IMM DTaP/Tdap/Td Vaccine (3 - Td or Tdap) Next due on 3/14/2032      03/14/2022  Imm Admin: Tdap Vaccine    12/09/2011  Imm Admin: Tdap Vaccine              Hepatitis B Vaccine (Hep B) (Series Information) Completed      08/04/2017  Imm Admin: Hepatitis B Vaccine (Adol/Adult)    02/03/2017  Imm Admin: Hepatitis B Vaccine (Adol/Adult)    12/22/2016  Imm Admin: Hepatitis B Vaccine (Adol/Adult)              Zoster (Shingles) Vaccines (Series Information) Completed      11/26/2019  Imm Admin: Zoster Vaccine Recombinant (RZV) (SHINGRIX)    09/10/2019  Imm Admin: Zoster Vaccine Recombinant (RZV) (SHINGRIX)    09/02/2014  Imm Admin: Zoster Vaccine Live (ZVL) (Zostavax) - HISTORICAL DATA              Pneumococcal Vaccine: 65+ Years (Series Information) Completed      11/14/2022  Imm Admin: Pneumococcal Conjugate Vaccine (PCV20)    11/08/2021  Imm Admin: Pneumococcal Conjugate Vaccine (Prevnar/PCV-13)    12/22/2016  Imm Admin: Pneumococcal polysaccharide vaccine (PPSV-23)              Hepatitis C Screening  Completed      01/17/2024  Done    12/23/2015  Hepatitis C Antibody component of HEPATITIS PANEL ACUTE(4 COMPONENTS)              COVID-19 Vaccine (Series Information) Completed      09/26/2024  Imm Admin: COVID-19, mRNA, LNP-S, PF, jorge l-sucrose, 30 mcg/0.3 mL    09/25/2023  Imm Admin: COVID-19, mRNA, LNP-S, PF, jorge l-sucrose, 30 mcg/0.3 mL    09/28/2022  Imm Admin: MODERNA BIVALENT BOOSTER SARS-COV-2 VACCINE (6+)    04/21/2022  Imm Admin: MODERNA SARS-COV-2 VACCINE (12+)    10/26/2021  Imm Admin: MODERNA SARS-COV-2 VACCINE (12+)    Only the first 5 history entries have been loaded, but more history exists.              Influenza Vaccine (Series Information) Completed      10/26/2024  Imm Admin: Influenza high-dose trivalent (PF)    09/05/2023  Imm Admin: Influenza Vaccine Adult HD    09/06/2022  Imm Admin: Influenza Vaccine Adult HD    10/18/2021   Imm Admin: Influenza Vaccine Quad Inj (Pf)    09/27/2020  Imm Admin: Influenza Vaccine Quad Inj (Pf)    Only the first 5 history entries have been loaded, but more history exists.              Hepatitis A Vaccine (Hep A) (Series Information) Aged Out      No completion history exists for this topic.              HPV Vaccines (Series Information) Aged Out      No completion history exists for this topic.              Polio Vaccine (Inactivated Polio) (Series Information) Aged Out      No completion history exists for this topic.              Meningococcal Immunization (Series Information) Aged Out      No completion history exists for this topic.              Discontinued - Cervical Cancer Screening  Discontinued        Frequency changed to Never automatically (Topic No Longer Applies)    02/23/2017  THINPREP PAP WITH HPV    02/23/2017  PATHOLOGY GYN SPECIMEN    02/23/2017  Done -     02/23/2017  Done    Only the first 5 history entries have been loaded, but more history exists.                    Patient Care Team:  Kenneth Reinoso M.D. as PCP - General  Kenneth Reinoso M.D. as PCP - [facility] Paneled  Marguerite Barajas R.N. (Inactive) as   [facility] [place] as Respiratory Therapist (DME Supplier)      Current social contact/activities: Reviewed    ROS:    Ostomy or other tubes or amputations no  Chronic oxygen use no  Gait: normal. Uses assistive device :  no       Health Care Screening recommendations reviewed with patient today and updated or ordered.  DPA/Advanced directive: Completed/Information provided.   Referrals for PT/OT/Nutrition counseling/Behavioral Health/Smoking cessation as above if indicated  Discussion today about general wellness and lifestyle habits:    Prevent falls and reduce trip hazards; no falls   Have a working fire alarm and carbon monoxide detector;   Engage in regular physical activity and social activities;   Use sun protection when outdoors uses sunscreen     ROS      "      Objective     /74   Pulse 78   Temp 36.6 °C (97.8 °F)   Ht 1.6 m (5' 3\")   Wt 84.8 kg (187 lb)   SpO2 98%   BMI 33.13 kg/m²      Physical Exam  Vitals and nursing note reviewed.   Constitutional:       General: She is not in acute distress.     Appearance: Normal appearance.   HENT:      Head: Normocephalic and atraumatic.      Right Ear: External ear normal.      Left Ear: External ear normal.      Nose: Nose normal.   Eyes:      Conjunctiva/sclera: Conjunctivae normal.   Cardiovascular:      Rate and Rhythm: Normal rate and regular rhythm.      Heart sounds: Normal heart sounds.   Pulmonary:      Effort: Pulmonary effort is normal.      Breath sounds: Normal breath sounds.   Abdominal:      General: There is no distension.   Musculoskeletal:         General: No swelling.      Cervical back: Neck supple.   Skin:     Coloration: Skin is not jaundiced.      Findings: No bruising.   Neurological:      General: No focal deficit present.      Mental Status: She is alert.   Psychiatric:         Mood and Affect: Mood normal.         Behavior: Behavior normal.                             Assessment & Plan       Assessment  #1 Medicare wellness assessment    #2 status post vaginal hysterectomy, BSO October 8 by  urogynecology    #3 history of lymphoma monitored by  oncology, I was able to find the CT from December 3 at [facility], CT chest, abdomen, pelvis no evidence of recurrent disease, CBC monitored by oncology most recent labs that I have 10/2/24 wbc 8.5,hgb 13.9,hct 43    #4 sleep apnea continues on CPAP recently as home sleep study suggested CPAP versus AutoPap titration study    #5 dyslipidemia 2/13/24 chol 212,trig 170,hdl 55,ldl 123 with CT calcium score 0 in April 2022    #6 hypothyroid on replacement 7/18/24 tsh 1.6 on levothyroxine 125 mcg one tab six days per week and two tabs day 7    #7 BMI 33.13 obesity working on nutrition, exercise, she has been less active during " the winter and eating a bit more sweets during the holidays but has been cleared to resume activity after her recent surgery    #8 postmenopausal bone loss    #9 history of vitamin D deficiency    #10 impaired glucose metabolism      Plan  #1 health maintenance reviewed and updated    #2 reviewed old records and problem list updated, that took an additional 20 minutes outside the appointment, I was able to find the CT scan that she had done by oncology as well    #3 mammogram    #4 bone density    #5 old records dermatology, Dr. Malave ophthal[facility], [place] [facility]    #6 follow-up sleep apnea group    #7 continue working on a good exercise program, recommend continuing walking, going back to the gym and starting weight training and swimming    #8 she may be a candidate for Mounjaro since that was recently approved by Medicare for sleep apnea, she states she would like to try working on nutrition and exercise first, I agree, we need a good foundation of nutrition, exercise at I would like to see her again in 6 months to see where she is at with regards to her weight, ideally we would aim for a weight around 150 pounds which would put her at a BMI of about 26    #9 labs we will also check ApoB and lipoprotein a    #10 follow-up 6 months

## 2025-01-20 NOTE — LETTER
D[facility]  Kenneth Reinoso M.D.  [address]  [place] [address]  Fax: [phone]   Authorization for Release/Disclosure of   Protected Health Information   Name: FLORENCIO HAMILTON : 1955 SSN: [id number]   Address: [address] [place] [address]  [place] [address] Phone:    There are no phone numbers on file.   I authorize the entity listed below to release/disclose the PHI below to:   [facility]/Kenneth Reinoso M.D. and Kenneth Reinoso M.D.   Provider or Entity Name:  [facility], Elian Malave M.D.   Address   [facility], [place]  [address], [place] [address] Phone:      Fax:     Reason for request: continuity of care   Information to be released: Records for past 12 months    [  ] LAST COLONOSCOPY,  including any PATH REPORT and follow-up  [  ] LAST FIT/COLOGUARD RESULT [  ] LAST DEXA  [  ] LAST MAMMOGRAM  [  ] LAST PAP  [  ] LAST LABS [  ] RETINA EXAM REPORT  [  ] IMMUNIZATION RECORDS  [ X ] Release all info      [  ] Check here and initial the line next to each item to release ALL health information INCLUDING  _____ Care and treatment for drug and / or alcohol abuse  _____ HIV testing, infection status, or AIDS  _____ Genetic Testing    DATES OF SERVICE OR TIME PERIOD TO BE DISCLOSED: _____________  I understand and acknowledge that:  * This Authorization may be revoked at any time by you in writing, except if your health information has already been used or disclosed.  * Your health information that will be used or disclosed as a result of you signing this authorization could be re-disclosed by the recipient. If this occurs, your re-disclosed health information may no longer be protected by State or Federal laws.  * You may refuse to sign this Authorization. Your refusal will not affect your ability to obtain treatment.  * This Authorization becomes effective upon signing and will  on (date) __________.      If no date is indicated, this  Authorization will  one (1) year from the signature date.    Name: Mari Kinsey  Signature: continuity of care Date:   2025     PLEASE FAX REQUESTED RECORDS BACK TO: [phone]

## 2025-01-20 NOTE — TELEPHONE ENCOUNTER
[facility] old records   (1) skin cancer dermatology [place] for the past 12 months  (2) [facility] [facility] for the past 12 months  (3) Dr. Malave optometry for the past 12 months

## 2025-01-27 ENCOUNTER — OFFICE VISIT (OUTPATIENT)
Dept: SLEEP MEDICINE | Facility: MEDICAL CENTER | Age: 70
End: 2025-01-27
Attending: NURSE PRACTITIONER
Payer: MEDICARE

## 2025-01-27 VITALS
HEIGHT: 63 IN | BODY MASS INDEX: 33.13 KG/M2 | SYSTOLIC BLOOD PRESSURE: 112 MMHG | OXYGEN SATURATION: 98 % | WEIGHT: 187 LBS | HEART RATE: 70 BPM | DIASTOLIC BLOOD PRESSURE: 78 MMHG | RESPIRATION RATE: 16 BRPM

## 2025-01-27 DIAGNOSIS — G47.33 OBSTRUCTIVE SLEEP APNEA: ICD-10-CM

## 2025-01-27 DIAGNOSIS — Z78.9 NONSMOKER: ICD-10-CM

## 2025-01-27 PROCEDURE — 99213 OFFICE O/P EST LOW 20 MIN: CPT | Performed by: NURSE PRACTITIONER

## 2025-01-27 PROCEDURE — 3078F DIAST BP <80 MM HG: CPT | Performed by: NURSE PRACTITIONER

## 2025-01-27 PROCEDURE — 3074F SYST BP LT 130 MM HG: CPT | Performed by: NURSE PRACTITIONER

## 2025-01-27 ASSESSMENT — FIBROSIS 4 INDEX: FIB4 SCORE: 0.9

## 2025-01-27 NOTE — PROGRESS NOTES
Chief Complaint   Patient presents with    Follow-Up     SLEEP - ESTABLISHED PT CHART PREP COMPLETED ON 1/23/25     Last Office Visit 1/23/25 with Castillo Borrego     1st Compliance: No     DME: Isleep     PAP/O2/OAT: AirSense 10 AutoSet  Set pressure (cmH2O) 9.0    Wireless Data? YES - Resmed   Compliance uploaded        HPI:  Mari Kinsey is a 69 y.o. year old female here today for follow-up on ALLEN.  Last OV 9/25/24 with Isaak BRIGGS     Currently using CPAP @ 9cm H20 nightly; RESMED; device obtained 3/1/21.  Compliance report of 20 4/24 through 1/22/2025 indicates 97% compliance, average nightly use 5 hours 43 minutes, moderate mask leak with reduced AHI of 0.4/h.  Reviewed with patient.    Interval Events:  1/27/25: Patient is overall sleeping well.  She did add magnesium at bedtime and is noted improvement.  She generally goes to bed at 10:30 PM to midnight and fall asleep quickly.  She is sleeping through the night since adding magnesium and waking between 6:30 AM and 7 AM.  No shortness of breath or headaches upon rising.  She notes having 3-4 bad nights per month.  No regular daytime napping or dozing off.  She tolerates pressure but sometimes can find the mask uncomfortable.  She notes sometimes if laying down she may have some pain in her right lower extremity and unsure about if she is having restless legs.  Notes varicose veins present.    SLEEP HX:  HST via Watchpat 10/31/24:  1. Mild obstructive sleep apnea with 4% PAT apnea hypopnea index(pAHI) of 8.8 per hour.  PAT respiratory disturbance index (pRDI) was 18.9 per hour. These findings are based on 7 channels recording of PAT signal with sleep staging, heart rate, pulse oximetry, actigraphy, body position, snoring and respiratory movement.      2. Oxygenation O2 Sat. mean O2 sat was 91%,  consuelo was 86%,  and maximum O2 at 97 %. O2 sat was at or  below 88% for 6.5 min of evaluation time. Oxygen Desaturation (>=4%) Index was 8.3/hr. AVG HR was  55 BPM.      ROS: As per HPI and otherwise negative if not stated.    Past Medical History:   Diagnosis Date    Cancer (HCC)     Chickenpox     Dental disorder     upper bridge    Citizen of [place] measles     Gynecological disorder 4/24    Had D&C    Hypothyroidism     Influenza     Mumps     ALLEN (obstructive sleep apnea)     Sleep apnea     cpap    Snoring     sleep study done    Thyroid disease     Tonsillitis        Past Surgical History:   Procedure Laterality Date    LAPAROSCOPIC ASSISTED VAGINAL HYSTERECTOMY  10/8/2024    Procedure: LAPAROSCOPIC ASSISTED VAGINAL HYSTERECTOMY, BILATERAL SALPINGO-OOPHORECTOMY, VAULT SUSPENSION, ANTERIOR REPAIR, POSTERIOR REPAIR, PERINEORRHAPHY, CYSTOSCOPY AND ALL INDICATED PROCEDURES;  Surgeon: Magdalene Sorto M.D.;  Location: SURGERY SAME DAY [facility];  Service: Gynecology    VAGINAL SUSPENSION  10/8/2024    Procedure: COLPOPEXY;  Surgeon: Magdalene Sorto M.D.;  Location: SURGERY SAME DAY [facility];  Service: Gynecology    HYSTEROSCOPY WITH MYOSURE N/A 04/03/2024    Procedure: HYSTEROSCOPY, DILATION AND CURETTAGE;  Surgeon: Crista Wallace D.O.;  Location: SURGERY SAME DAY [facility];  Service: Gynecology    DILATION AND CURETTAGE N/A 04/03/2024    Procedure: DILATION AND CURETTAGE;  Surgeon: Crista Wallace D.O.;  Location: SURGERY SAME DAY [facility];  Service: Gynecology    KALE BY LAPAROSCOPY  02/03/2016    Procedure: KALE BY LAPAROSCOPY;  Surgeon: Tricia Farah M.D.;  Location: SURGERY SAME DAY [facility];  Service:     LYMPH NODE SAMPLING  02/03/2016    Procedure: LYMPH NODE SAMPLING EXCISION, BIOPSY;  Surgeon: Tricia Farah M.D.;  Location: SURGERY SAME DAY [facility];  Service:     BONE GRAFT      at 19 years of age    BUNIONECTOMY      HYSTEROSCOPY WITH VIDEO DIAGNOSTIC      NASAL SEPTAL RECONSTRUCTION      OTHER      bone graft to ;hand    OTHER ABDOMINAL SURGERY  2019?    Removed gall bladder    OTHER ORTHOPEDIC SURGERY  1973    Bone graft from hip to 5th metacarpal  (only cartilage there)    TONSILLECTOMY         Family History   Problem Relation Age of Onset    Arthritis Mother         osteoporosis    Hypertension Mother     Hyperlipidemia Mother     Heart Disease Father         Heart murmur    Lung Disease Father         Emphysema (longtime smoker)    No Known Problems Sister     No Known Problems Sister     Alcohol abuse Sister     No Known Problems Brother     No Known Problems Brother     No Known Problems Brother     Sleep Apnea Neg Hx        Social History     Socioeconomic History    Marital status:      Spouse name: Not on file    Number of children: Not on file    Years of education: Not on file    Highest education level: Bachelor's degree (e.g., BA, AB, BS)   Occupational History    Not on file   Tobacco Use    Smoking status: Never     Passive exposure: Yes    Smokeless tobacco: Never    Tobacco comments:     parents smoke when she was a child   Vaping Use    Vaping status: Never Used   Substance and Sexual Activity    Alcohol use: Never    Drug use: Never    Sexual activity: Not Currently   Other Topics Concern    Not on file   Social History Narrative    Not on file     Social Drivers of Health     Financial Resource Strain: Low Risk  (1/19/2025)    Overall Financial Resource Strain (CARDIA)     Difficulty of Paying Living Expenses: Not hard at all   Food Insecurity: No Food Insecurity (1/19/2025)    Hunger Vital Sign     Worried About Running Out of Food in the Last Year: Never true     Ran Out of Food in the Last Year: Never true   Transportation Needs: No Transportation Needs (1/19/2025)    PRAPARE - Transportation     Lack of Transportation (Medical): No     Lack of Transportation (Non-Medical): No   Physical Activity: Insufficiently Active (1/19/2025)    Exercise Vital Sign     Days of Exercise per Week: 4 days     Minutes of Exercise per Session: 30 min   Stress: No Stress Concern Present (1/19/2025)    [place] [employer] -  "Occupational Stress Questionnaire     Feeling of Stress : Only a little   Social Connections: Socially Integrated (1/19/2025)    Social Connection and Isolation Panel [NHANES]     Frequency of Communication with Friends and Family: More than three times a week     Frequency of Social Gatherings with Friends and Family: Three times a week     Attends Restorationism Services: More than 4 times per year     Active Member of Clubs or Organizations: Yes     Attends Club or Organization Meetings: More than 4 times per year     Marital Status:    Intimate Partner Violence: Not At Risk (2/9/2022)    Humiliation, Afraid, Rape, and Kick questionnaire     Fear of Current or Ex-Partner: No     Emotionally Abused: No     Physically Abused: No     Sexually Abused: No   Housing Stability: Low Risk  (1/19/2025)    Housing Stability Vital Sign     Unable to Pay for Housing in the Last Year: No     Number of Times Moved in the Last Year: 0     Homeless in the Last Year: No       Allergies as of 01/27/2025 - Reviewed 01/27/2025   Allergen Reaction Noted    Penicillins Rash 02/26/2010        Vitals:  /78 (BP Location: Left arm, Patient Position: Sitting, BP Cuff Size: Adult)   Pulse 70   Resp 16   Ht 1.6 m (5' 3\")   Wt 84.8 kg (187 lb)   SpO2 98%     Current medications as of today   Current Outpatient Medications   Medication Sig Dispense Refill    influenza vaccine High-Dose (FLUZONE HIGH-DOSE) 0.5 ML Suspension Prefilled Syringe injection Inject  into the shoulder, thigh, or buttocks. 0.5 mL 0    levothyroxine (SYNTHROID) 125 MCG Tab Frequency change to one pill 6 days per week, two pills on day 7 so a total of 8 tabs every 7 days  Indications: low thyroid 96 Tablet 2    Magnesium Glycinate 100 MG Cap Take 200 mg by mouth every day.          **MEDICATION INSTRUCTIONS FOR SURGERY/PROCEDURE SCHEDULED FOR 10/8/2024   DO NOT TAKE FOR 7 DAYS PRIOR TO SURGERY      Multiple Vitamins-Minerals (PRESERVISION AREDS 2 PO) Take  by " mouth every day.         **MEDICATION INSTRUCTIONS FOR SURGERY/PROCEDURE SCHEDULED FOR 10/8/2024   DO NOT TAKE FOR 7 DAYS PRIOR TO SURGERY      vitamin D (CHOLECALCIFEROL) 1000 UNIT Tab Take 2,000 Units by mouth every day.         **MEDICATION INSTRUCTIONS FOR SURGERY/PROCEDURE SCHEDULED FOR 10/8/2024   DO NOT TAKE FOR 7 DAYS PRIOR TO SURGERY       No current facility-administered medications for this visit.         Physical Exam:   Gen:           Alert and oriented, No apparent distress. Mood and affect appropriate, normal interaction with examiner.  Eyes:          PERRL, EOM intact, sclere white, conjunctive moist. Glasses.  Ears:          Not examined.   Hearing:     Grossly intact.  Nose:          Normal, no lesions or deformities.  Dentition:    Not examined.   Oropharynx:   Not examined.   Mallampati Classification: Not examined.   Neck:        Supple, trachea midline, no masses.  Respiratory Effort: No intercostal retractions or use of accessory muscles.   Lung Auscultation:      Clear to auscultation bilaterally; no rales, rhonchi or wheezing.  CV:            Regular rate and rhythm. No murmurs, rubs or gallops.  Abd:           Not examined.   Lymphadenopathy: Not examined.  Gait and Station: Normal.  Digits and Nails: No clubbing, cyanosis, petechiae, or nodes.   Cranial Nerves: II-XII grossly intact.  Skin:        No rashes, lesions or ulcers noted.               Ext:           No cyanosis or edema.      Assessment:  1. Obstructive sleep apnea        2. BMI 33.0-33.9,adult        3. Nonsmoker            Immunizations:    Flu:10/2024  Pneumovax 23:2016  Prevnar 13:not due  PCV 20: 2022  COVID-19: 2024    Plan:  ALLEN is well treated.  She will continue to benefit from nightly CPAP therapy.   DME mask/supplies  Encourage weight loss or healthy diet and activity.  Follow-up in 1 year with compliance report, sooner if needed.  Advise she talk to primary care if she feels leg symptoms are worsening.  May benefit  from referral to vein clinic for ultrasound of lower extremities.    Please note that this dictation was created using voice recognition software. I have made every reasonable attempt to correct obvious errors, but it is possible there are errors of grammar and possibly content that I did not discover before finalizing the note.

## 2025-02-12 ENCOUNTER — HOSPITAL ENCOUNTER (OUTPATIENT)
Facility: MEDICAL CENTER | Age: 70
End: 2025-02-12
Attending: INTERNAL MEDICINE
Payer: MEDICARE

## 2025-02-12 DIAGNOSIS — R73.09 IMPAIRED GLUCOSE METABOLISM: ICD-10-CM

## 2025-02-12 DIAGNOSIS — E55.9 VITAMIN D DEFICIENCY: ICD-10-CM

## 2025-02-12 DIAGNOSIS — E78.5 DYSLIPIDEMIA: Chronic | ICD-10-CM

## 2025-02-12 DIAGNOSIS — R31.29 MICROSCOPIC HEMATURIA: ICD-10-CM

## 2025-02-12 PROBLEM — Z85.72 HISTORY OF LYMPHOMA: Status: ACTIVE | Noted: 2025-02-12

## 2025-02-12 PROBLEM — R91.1 PULMONARY NODULE: Status: ACTIVE | Noted: 2025-02-12

## 2025-02-12 LAB
25(OH)D3 SERPL-MCNC: 36 NG/ML (ref 30–100)
APPEARANCE UR: CLEAR
BACTERIA #/AREA URNS HPF: ABNORMAL /HPF
BASOPHILS # BLD AUTO: 0.8 % (ref 0–1.8)
BASOPHILS # BLD: 0.06 K/UL (ref 0–0.12)
BILIRUB UR QL STRIP.AUTO: NEGATIVE
CASTS URNS QL MICRO: ABNORMAL /LPF (ref 0–2)
CHOLEST SERPL-MCNC: 226 MG/DL (ref 100–199)
COLOR UR: YELLOW
EOSINOPHIL # BLD AUTO: 0.74 K/UL (ref 0–0.51)
EOSINOPHIL NFR BLD: 9.7 % (ref 0–6.9)
EPITHELIAL CELLS 1715: ABNORMAL /HPF (ref 0–5)
ERYTHROCYTE [DISTWIDTH] IN BLOOD BY AUTOMATED COUNT: 48.2 FL (ref 35.9–50)
EST. AVERAGE GLUCOSE BLD GHB EST-MCNC: 117 MG/DL
FASTING STATUS PATIENT QL REPORTED: NORMAL
GLUCOSE UR STRIP.AUTO-MCNC: NEGATIVE MG/DL
HBA1C MFR BLD: 5.7 % (ref 4–5.6)
HCT VFR BLD AUTO: 43.9 % (ref 37–47)
HDLC SERPL-MCNC: 54 MG/DL
HGB BLD-MCNC: 13.7 G/DL (ref 12–16)
IMM GRANULOCYTES # BLD AUTO: 0.01 K/UL (ref 0–0.11)
IMM GRANULOCYTES NFR BLD AUTO: 0.1 % (ref 0–0.9)
KETONES UR STRIP.AUTO-MCNC: NEGATIVE MG/DL
LDLC SERPL CALC-MCNC: 143 MG/DL
LEUKOCYTE ESTERASE UR QL STRIP.AUTO: ABNORMAL
LYMPHOCYTES # BLD AUTO: 2.28 K/UL (ref 1–4.8)
LYMPHOCYTES NFR BLD: 29.9 % (ref 22–41)
MCH RBC QN AUTO: 30.4 PG (ref 27–33)
MCHC RBC AUTO-ENTMCNC: 31.2 G/DL (ref 32.2–35.5)
MCV RBC AUTO: 97.3 FL (ref 81.4–97.8)
MICRO URNS: ABNORMAL
MONOCYTES # BLD AUTO: 0.7 K/UL (ref 0–0.85)
MONOCYTES NFR BLD AUTO: 9.2 % (ref 0–13.4)
NEUTROPHILS # BLD AUTO: 3.84 K/UL (ref 1.82–7.42)
NEUTROPHILS NFR BLD: 50.3 % (ref 44–72)
NITRITE UR QL STRIP.AUTO: NEGATIVE
NRBC # BLD AUTO: 0 K/UL
NRBC BLD-RTO: 0 /100 WBC (ref 0–0.2)
PH UR STRIP.AUTO: 5.5 [PH] (ref 5–8)
PLATELET # BLD AUTO: 326 K/UL (ref 164–446)
PMV BLD AUTO: 10 FL (ref 9–12.9)
PROT UR QL STRIP: NEGATIVE MG/DL
RBC # BLD AUTO: 4.51 M/UL (ref 4.2–5.4)
RBC # URNS HPF: ABNORMAL /HPF (ref 0–2)
RBC UR QL AUTO: NEGATIVE
SP GR UR STRIP.AUTO: 1.02
TRIGL SERPL-MCNC: 147 MG/DL (ref 0–149)
TSH SERPL-ACNC: 4.12 UIU/ML (ref 0.35–5.5)
UROBILINOGEN UR STRIP.AUTO-MCNC: 0.2 EU/DL
WBC # BLD AUTO: 7.6 K/UL (ref 4.8–10.8)
WBC #/AREA URNS HPF: ABNORMAL /HPF

## 2025-02-12 PROCEDURE — 82172 ASSAY OF APOLIPOPROTEIN: CPT

## 2025-02-12 PROCEDURE — 80061 LIPID PANEL: CPT

## 2025-02-12 PROCEDURE — 36415 COLL VENOUS BLD VENIPUNCTURE: CPT

## 2025-02-12 PROCEDURE — 81001 URINALYSIS AUTO W/SCOPE: CPT

## 2025-02-12 PROCEDURE — 83695 ASSAY OF LIPOPROTEIN(A): CPT

## 2025-02-12 PROCEDURE — 85025 COMPLETE CBC W/AUTO DIFF WBC: CPT

## 2025-02-12 PROCEDURE — 84443 ASSAY THYROID STIM HORMONE: CPT

## 2025-02-12 PROCEDURE — 83036 HEMOGLOBIN GLYCOSYLATED A1C: CPT

## 2025-02-12 PROCEDURE — 82306 VITAMIN D 25 HYDROXY: CPT

## 2025-02-13 ENCOUNTER — OFFICE VISIT (OUTPATIENT)
Dept: MEDICAL GROUP | Facility: MEDICAL CENTER | Age: 70
End: 2025-02-13
Payer: MEDICARE

## 2025-02-13 VITALS
SYSTOLIC BLOOD PRESSURE: 112 MMHG | HEART RATE: 77 BPM | TEMPERATURE: 97.5 F | DIASTOLIC BLOOD PRESSURE: 80 MMHG | WEIGHT: 189.6 LBS | OXYGEN SATURATION: 97 % | HEIGHT: 63 IN | BODY MASS INDEX: 33.59 KG/M2

## 2025-02-13 DIAGNOSIS — G25.81 RESTLESS LEG SYNDROME: ICD-10-CM

## 2025-02-13 DIAGNOSIS — Z85.72 HISTORY OF LYMPHOMA: ICD-10-CM

## 2025-02-13 DIAGNOSIS — R73.09 IMPAIRED GLUCOSE METABOLISM: ICD-10-CM

## 2025-02-13 DIAGNOSIS — Z00.00 PREVENTATIVE HEALTH CARE: Chronic | ICD-10-CM

## 2025-02-13 DIAGNOSIS — E66.811 OBESITY (BMI 30.0-34.9): ICD-10-CM

## 2025-02-13 DIAGNOSIS — I27.20 PULMONARY HYPERTENSION, UNSPECIFIED (HCC): ICD-10-CM

## 2025-02-13 DIAGNOSIS — I70.0 ATHEROSCLEROSIS OF AORTA (HCC): ICD-10-CM

## 2025-02-13 PROCEDURE — 99214 OFFICE O/P EST MOD 30 MIN: CPT | Performed by: INTERNAL MEDICINE

## 2025-02-13 PROCEDURE — 3074F SYST BP LT 130 MM HG: CPT | Performed by: INTERNAL MEDICINE

## 2025-02-13 PROCEDURE — 3079F DIAST BP 80-89 MM HG: CPT | Performed by: INTERNAL MEDICINE

## 2025-02-13 ASSESSMENT — FIBROSIS 4 INDEX: FIB4 SCORE: 0.9

## 2025-02-13 NOTE — PROGRESS NOTES
Subjective     Mari Kinsey is a 69 y.o. female who presents with Follow-Up (Sleep study, restless leg syndrome ) and Lab Results            HPI    Patient is here for follow-up on labs also follow-up on leg symptoms.  She has sleep apnea, followed by the sleep group, using CPAP nightly.  Most recent sleep note from January 25 indicates CPAP of 9 with compliance 97%, average nightly use 5 hours 43 minutes.  About 3 times a week at night only she will feel like she has to move her legs around, she will do that and her leg symptoms will resolve she does not have to get up and walk around.  No pain, just a sensation that she needs to move her legs.  Sometimes those will occur if she is watching a movie with her legs up for a long period of time but otherwise the symptoms do not occur outside of bedtime.  She has no leg pain with ambulation, walking, or exercise.  No history of restless leg.  She has been eating a bit more sweets and sourdough bread over the winter months, no soda, no fruit juices, still trying to keep active and walk 1 to 2 miles a day 40 minutes at a time.  Still sees hematology oncology every 6 months with a history of lymphoma.  Hypothyroid on replacement levothyroxine 125 mcg 1 tablet daily, 6 days a week, on the 7 days she will take 2 tabs.          Current Outpatient Medications   Medication Sig Dispense Refill    influenza vaccine High-Dose (FLUZONE HIGH-DOSE) 0.5 ML Suspension Prefilled Syringe injection Inject  into the shoulder, thigh, or buttocks. 0.5 mL 0    levothyroxine (SYNTHROID) 125 MCG Tab Frequency change to one pill 6 days per week, two pills on day 7 so a total of 8 tabs every 7 days  Indications: low thyroid 96 Tablet 2    Magnesium Glycinate 100 MG Cap Take 200 mg by mouth every day.          **MEDICATION INSTRUCTIONS FOR SURGERY/PROCEDURE SCHEDULED FOR 10/8/2024   DO NOT TAKE FOR 7 DAYS PRIOR TO SURGERY      Multiple Vitamins-Minerals (PRESERVISION AREDS 2 PO) Take  by  mouth every day.         **MEDICATION INSTRUCTIONS FOR SURGERY/PROCEDURE SCHEDULED FOR 10/8/2024   DO NOT TAKE FOR 7 DAYS PRIOR TO SURGERY (Patient not taking: Reported on 2/13/2025)      vitamin D (CHOLECALCIFEROL) 1000 UNIT Tab Take 2,000 Units by mouth every day.         **MEDICATION INSTRUCTIONS FOR SURGERY/PROCEDURE SCHEDULED FOR 10/8/2024   DO NOT TAKE FOR 7 DAYS PRIOR TO SURGERY       No current facility-administered medications for this visit.         s/p hysterectomy  2/14/24  gynecology note postmenopausal bleeding, ultrasound ordered  2/16/24 ultrasound gynecologic endometrial stripe 5.9 mm considered thickened, 3 small uterine masses likely fibroids  3/7/24  gynecology note endometrial biopsy for thickened endometrium  4/3/24 dr.mckenzie moreau gynecology procedure note hysteroscopy, dilatation and curettage  4/15/24 dr.mckenzie moreau gynecology 2 weeks follow-up hysteroscopy, D&C with polypectomy, normal pathology  10/8/24  urogynecology operative note laparoscopic vaginal hysterectomy, BSO, vaginal colpopexy, anterior posterior repair  11/19/24  urogynecology note no longer needs routine gynecologic evaluation, resume vaginal estrogen as needed     s/p bunion surgery left foot  Sees podiatry   12/11 podiatry note  1/13 bunion surgery      Sleep apnea  2/11 [facility] sleep study moderate obstructive sleep apnea syndrome with apnea-hypotony index 28, low saturation 83%, successful CPAP titration final pressure 9 cm with improvement in apnea-hypotony index to 1.5 with low saturation 91%  3/11 [facility] note on cpap 9  3/12 [facility] note on cpap 9  12/12 off cpap  12/14 back on cpap 9  12/20/15 on cpap 9  10/10/17sleep note continue cpap 9  11/20/17 echo normal LV size and function, EF 70%, mild tricuspid regurgitation, RVSP 35  10/19/18 sleep note on cpap 9  10/21/19 sleep note on cpap 9   10/21/20 sleep note on cpap 9  5/4/21 sleep note on cpap 9  5/9/22 sleep  note continue cpap 9  11/19/22 to 12/18/22 resmed airview compliance report average use 5 hours 33 minutes on cpap 9, 90% compliance, mask leak 2.9 l/min  12/19/22 sleep note compliance report 93% compliance, mask leak 0.4, continues on cpap 9  9/8/23 sleep note on cpap 9 compliance 93%, AHI 0.4, follow-up 1 year  9/25/24 sleep note on cpap   10/31/24 home sleep study test, mild obstructive sleep apnea pAHI 8.8 per horu and pRDI 18.9 per hour mean saturation 91%, consuelo 86%, time less than 88% saturation 6.5 minutes, recommend CPAP versus AutoPap titration  1/25/25 sleep note on cpap 9 compliance report 97%, average nightly use 5 hours 43 minutes, moderate mask leak with reduced AHI 0.4     Skin lesion   12/16/20  dermatology note  2/22/23  dermatology note     Sensorineural hearing loss  1/9/24 [facility] audiology note mild to moderate severe high-frequency hearing loss    pulmonary nodule  6/4/21 CT chest, abdomen, pelvis with; stable mildly enlarged mesenteric lymph nodes compatible with low-grade lymphoma similar to CT on 12/29/16 but decreased compared to CT/PET 2/19/16 measuring up to 1.3 cm left mesentery, similar appearance of few small pulmonary nodules up to 4 mm left lower lobe (previously 4 mm on CT chest on 7/4/07)  4/22/22 CT cardiac calcium score 4.5 mm nodule left lower lobe  5/5/22  oncology note stable, recommend repeat CT chest, abdomen, pelvis, labs ordered  6/28/22 CT chest, abdomen, pelvis per oncology no new abnormalities, similar appearance few minimally enlarged intra-abdominal lymph nodes up to 1.1 cm (previously 1.3 cm)  7/6/22  oncology note no signs or symptoms of disease progression, continue routine surveillance follow-up   12/3/24 CT chest, abdomen, pelvis per oncology, stable 9 mm right paratracheal lymph node (previously 9 mm), increasing size of the right periaortic lymph node measuring 2 cm (previously 1 cm), stable 1 cm left mesenteric lymph node  (previously 1.1 cm)  12/17/24  oncology note clinically stable, continue surveillance     Preventative health  8/4/17 completed hep b vaccine series   8/4/17 completed hep b vaccine series   11/26/19 [facility]ingrix second at [facility]  3/22/21 colonoscopy per [facility] 3 mm benign-appearing sessile polyp pathology pending  4/9/21 GI telephone note, pathology from the terminal ileum and 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, 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, will follow up with    2/1/22 dexa LS-0.6,hip+0.7  3/14/22 tdap  11/14/22 prevnar   8/15/23 mammogram  9/19/23 rsv  2/13/24 vit d 53  9/26/24 covid  10/26/24 flu     Microscopic hematuria  12/19/14 UA 2-5 RBC repeat in 1 month  1/21/15 UA trace blood   1/27/15 ultrasound renal negative  2/15 drop of urine one more time, send out UA evaluate microscopic hematuria , if still positive for urology  2/27/15 UA positive blood refer to urology  4/4/15 CT abdomen and pelvis with and without; retroperitoneal periaortic pericaval and retrocrural adenopathy, mesenteric adenopathy, no hydronephrosis or urolithiasis  10/16/15 urology note microscopic hematuria workup, CT urogram negative, cystoscopy bladder neck inflammation with cytology negative, hematuria has resolved; followup 6 months  4/13/16 urology [place] note UA and cytology  12/28/18 UA   8/10/20 UA negative done at [facility]  2/13/24 UA rare blood     Macular drusen  12/20/18 referral to   2/13/19  eye exam macular degeneration, continue antioxidants and areds formula  7/7/20  eye exam  5/23/22  retina specialist macular drusen, posterior vitreous detachment, senile cataract  9/6/22 [place] [facility] macular drusen    9/12/23  eye care professional note posterior vitreous detachment observation, glaucoma suspect  9/26/23 [place] [facility] note  9/30/24 [place] [facility] note     Impaired glucose metabolism  12/28/18  A1c 5.7%  12/31/19 A1c 5.7%  11/5/21 A1c  5.4%  12/16/22 A1c 6.2%  4/14/23 A1c 5.7%  2/13/24 A1c 5.3%     Hypothyroid  9/10 tsh 1.9 on levothyroxine 112 mcg  12/9/11 tsh 4.1 on levothyroxine 112 mcg; increase to levothyroxine 125 mcg  2/12 tsh 2.4 cont levothyroxine 125 mcg  1/13 tsh 3.5 on levothyroxine 125 mcg  2/28/14 tsh 3.9 on levothyroxine 125 mcg  12/19/14 tsh 3.8 on levothyroxine 125 mcg  12/24/15 tsh 2.0 on levothyroxine 125 mcg  12/22/16 tsh 3.3 on levothyroxine 125 mcg   12/29/17 tsh 2.6 on levothyroxine 125 mcg  12/28/18 tsh 6.5 on levothyroxine 125 mcg, change to levothyroxine 137 mcg, repeat tsh 6 weeks  2/14/19 tsh 2.8 on levothyroxine 137 mcg   12/31/19 tsh 9.3 on levothyroxine 137 mcg taking daily, change to 150 mcg daily repeat tsh in 6 weeks ordered  2/4/20 tsh 2.4 on levothyroxine 150 mcg  8/7/20 tsh 2.9 on levothyroxine 150 mcg [facility]  3/12/21 tsh 3.4 on levothyroxine 150 mcg done at [facility]  11/5/21 tsh 7.7 ?thyroid dose, patient has been drinking tea 10 minutes after thyroid medication, will stop tea for 30 minutes, repeat tsh 6 weeks   1/4/22 tsh 11.0 on levothyroxine 150 mcg change to 200 mcg   3/21/22 tsh 0.4,free t4 1.59 on 200 mcg daily   12/16/22 tsh 0.85 on levothyroxine 200 mcg qday  2/13/24 tsh 0.22 on levothyroxine 200 mcg daily will change to six days per week and repeat tsh 6-8 weeks ordered  4/10/24 tsh 0.06 on levothyroxine 200 mcg six days per week change to 125 mcg daily repeat tsh 6-8 weeks ordered  5/29/24 tsh 6.7 on levothyroxine 125 mcg daily change to levothyroxine 125 mcg one tab six days per week and two tabs day 7, repeat tsh 6 weeks  7/18/24 tsh 1.6 on levothyroxine 125 mcg one tab six days per week and two tabs day 7     history of Lymphoma  4/4/15 CT abdomen and pelvis with and without; retroperitoneal periaortic pericaval and retrocrural adenopathy, mesenteric adenopathy, no hydronephrosis or urolithiasis  12/11/15 ,,alk phos 170,bili 1.2   12/20/15 ultrasound liver pending, repeat liver  enzymes and secondary workup pending, CT abdomen and pelvis ordered at urgent care pending follow-up lymphadenopathy  12/24/15 ultrasound abdomen and biliary tree, prominent liver size, no focal mass, gallbladder contains numerous mobile stones, no thickening or pericholecystic fluid is noted, maximum diameter common bile duct 9.8 which is enlarged, 2.6 x 2.2 x 2.6 cm nodule hypoechoic area in the pancreatic body not well visualized because of gas  12/24/15 AST 34,ALT 76,GGT 76.alk phos 88,bili 0.5,acute hep panel negative, iron 45,%sat 13,ferritin 67,AMA 22 (20-25 equivocal),F-actin Ab IgG negative,RE neg,SPEP neg  12/26/15 CT with contrast pending follow-up adenopathy on previous CT and possible pancreatic lesion on ultrasound, referral to surgery for cholelithiasis.  1/12/16  surgery scheduled for laparoscopic cholecystectomy, laparoscopic biopsy of lymph nodes, conversion to open procedure to complete biopsy if necessary to rule out lymphoma  2/3/16 laparoscopic mesenteric lymph node biopsy limited sampling, partial involvement follicular lymphoma B-cell origin favor low-grade 1-2/3, flow cytometry CD10 monoclonal B cells, demonstrating lambda light chain expression and call expression of CD10 with CD19, CD20, negative for CD5 and CD43  2/19/16 CT/PET hypermetabolic activity within retrocrural, periaortic, aortocaval space, proximal pelvic adenopathy within the left common iliac chain, hypermetabolic adenopathy within mesentery  3/16/16  oncology consultation; 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 consistent with low-grade non-hodgkin's lymphoma follicular-type CD10 and CD20 positive, likely low-grade apparently indolent course, most likely will need observation, recommend complete workup including bone marrow biopsy, LDH, CMP,and once complete will calculate FLPI score  4/6/16  oncology note wbc 4.6,hgb 13.9,hct 43,plt 240; stage IIIa follicular lymphoma  low-grade, recommend observation follow-up every 3 months first year and then every 6 months, CBC, CMP, LDH, beta-2 microglobulin and imaging studies as indicated, follow up 3 months  7/6/16 dr.reganti chairez note, wbc 7.7,hgb 13.6,hct 40,plt 304, ,beta 2 microglobulin 2.5 (1.1-2.4),uric 5.7;follow up 3 months  10/5/16 dr.reganti chairez note clinically no signs of recurrence, repeat CT chest, abdomen, pelvis in january, CMP, LDH today, follow-up 3 months  12/27/16 wbc 7.7 (49%N,30%L)  12/29/16 CT abdomen and pelvis with; interval resolution of retrocrural, para-aortic, paracaval, and mesenteric adenopathy, no adenopathy appreciated in pelvis, stable 2 cm or less diameter nodules right middle lobe  1/17/17 dr.reganti chairez note, doing well with recent CT showing no evidence of adenopathy, repeat labs prior to next visit CBC, CMP, LDH, beta-2 microglobulin, follow-up 6 months  7/12/17 cbc,cmp normal,,beta 2 microglobulin  7/18/17 dr.reganti chairez note no evidence disease progression  12/29/17 wbc 4.3 (61%N,18%L)  1/16/18 dr.reganti chairez note no evidence of disease progression, recent labs cbc,cmp,ldh normal, follow-up 6 months  7/30/18 dr.reganti chairez note stable no recurrence of disease, labs ordered  1/28/19 dr.reganti chairez note repeat labs follow up 6 months  8/7/19 wbc 8.0,hgb 13,hct 43,,cmp negative  8/14/19 dr.reganti chairez note no clinical symptoms, laboratory work reviewed, follow-up 1 year  8/14/20 dr.reganti chairez note no signs of disease recurrence or progression, continue observation  3/22/21 colonoscopy per [facility] 3 mm benign-appearing sessile polyp pathology pending, pathology from the terminal ileum and 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, 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, will follow up with    5/12/21 dr.reganti chairez note reviewed recent pathology colonoscopy showing terminal ileum biopsies positive for follicular  lymphoma, currently asymptomatic, CT scan and labs ordered, follow-up depending on results  6/4/21 CT chest, abdomen, pelvis with; stable mildly enlarged mesenteric lymph nodes compatible with low-grade lymphoma similar to CT on 12/29/16 but decreased compared to CT/PET 2/19/16 measuring up to 1.3 cm left mesentery, similar appearance of few small pulmonary nodules up to 4 mm left lower lobe (previously 4 mm on CT chest on 7/4/07)  12/16/21  oncology note no signs of disease progression, continue to monitor  5/5/22  oncology note stable, recommend repeat CT chest, abdomen, pelvis, labs ordered  6/28/22 CT chest, abdomen, pelvis per oncology no new abnormalities, similar appearance few minimally enlarged intra-abdominal lymph nodes up to 1.1 cm (previously 1.3 cm)  7/6/22  oncology note no signs or symptoms of disease progression, continue routine surveillance follow-up   12/16/22 wbc 6.8  1/4/23  oncology note remains asymptomatic, follow-up 6 months  7/6/23  oncology note stable   1/17/24  oncology note stable no signs of progression or transformation, continue watch and wait approach, follow-up 6 months  7/26/24  oncology note clinically stable, repeat CT chest, abdomen, pelvis prior to next visit  12/3/24 CT chest, abdomen, pelvis per oncology, stable 9 mm right paratracheal lymph node (previously 9 mm), increasing size of the right periaortic lymph node measuring 2 cm (previously 1 cm), stable 1 cm left mesenteric lymph node (previously 1.1 cm)  12/17/24  oncology note clinically stable, continue surveillance     history of hypertension  7/9/20 start lisinopril 10 mg  8/7/20 on lisinopril 10 mg urine mac<30 done [facility]  4/12/21 off lisinopril due to weight loss  4/22/22 CT calcium score total 0.0, incidental 4.5 nodule left lower lobe (6/4/21 CT chest, abdomen, pelvis with; similar appearance of few small pulmonary nodules up to 4  mm left lower lobe (previously 4 mm on CT chest on 07)     Dyslipidemia  9/10 chol 222,trig 144,hdl 48,ldl 144  11 chol 230,trig 106,hdl 55,ldl 154   chol 224,trig 109,hdl 46,ldl 156; 10 yr risk 4%  14 chol 226,trig 127,hdl 64,ldl 137  14 chol 205,trig 111,hdl 51,ldl 132  4/23/15 chol 212,trig 106,hdl 58,ldl 133  12/24/15 chol 212,trig 56,hdl 62,ldl 139  16 chol 231,trig 72,hdl 65,ldl 152  17 chol 208,trig 70,hdl 63,ldl 131  18 chol 219,trig 199,hdl 57,ldl 122  19 chol 221,trig 178,hdl 57,ldl 128; 10 year risk 5.3%  20 chol 233,trig 143,hdl 56,ldl 150 done at [facility], declines statin  3/12/21 chol 254,trig 121,hdl 63,ldl 166 done at [facility]; 10 year risk 5.4%, declined statin, will work on diet, exercise, repeat labs 6 months printed to mail to her  21 chol 213,trig 135,hdl 59,ldl 127  22 chol 223,trig 101,hdl 56,ldl 147, tsh 11 so will increase synthroid and repeat tsh 8 weeks  3/21/22 chol 241,trig 162,hdl 61,ldl 148  22 CT calcium score total 0.0   22 chol 212,trig 143,hdl 54,ldl 129   24 chol 212,trig 170,hdl 55,ldl 123      Deviated nasal septum     Allergic rhinitis  14  allergy note tolerating immunotherapy  8/1/15  allergy note on immunotherapy  16  allergy note allergic rhinitis tolerating immunotherapy  3/19/18  allergy note off immunotherapy x1 year, symptoms restarted this past winter, patient would like to restart immunotherapy  10/30/18  allergy note stable on immunotherapy  10/22/19  allergy note continue immunotherapy  19 monthly shots per   20 monthly shots per    21 stopped allergy shots last month once cat              Patient Active Problem List   Diagnosis    Hypothyroid    Preventative health care    S/p bunion surgery left foot    Deviated nasal septum    Dyslipidemia    Sleep apnea    Skin lesion    Family history of  "breast cancer in sister    Macular drusen    Obesity (BMI 30.0-34.9)    Microscopic hematuria    Allergic rhinitis    History of hypertension    Impaired glucose metabolism    Atherosclerosis of aorta (HCC)    Sensorineural hearing loss    History of COVID-19    S/p vaginal hysterectomy and BSO    History of lymphoma    Pulmonary nodule benign              Patient Care Team:  Kenneth Reinoso M.D. as PCP - General  Kenneth Reinoso M.D. as PCP - [facility] Paneled  Marguerite Barajas R.N. (Inactive) as   [employer] as Respiratory Therapist (DME Supplier)      ROS           Objective     /80 (BP Location: Left arm, Patient Position: Sitting, BP Cuff Size: Adult)   Pulse 77   Temp 36.4 °C (97.5 °F) (Temporal)   Ht 1.6 m (5' 3\")   Wt 86 kg (189 lb 9.6 oz)   SpO2 97%   BMI 33.59 kg/m²      Physical Exam  Vitals and nursing note reviewed.   Constitutional:       Appearance: Normal appearance.   HENT:      Head: Normocephalic and atraumatic.      Right Ear: External ear normal.      Nose: Nose normal.   Eyes:      Conjunctiva/sclera: Conjunctivae normal.   Cardiovascular:      Rate and Rhythm: Normal rate and regular rhythm.      Heart sounds: Normal heart sounds.   Pulmonary:      Effort: Pulmonary effort is normal.      Breath sounds: Normal breath sounds.   Abdominal:      General: There is no distension.   Musculoskeletal:         General: No swelling.      Cervical back: Neck supple.   Skin:     Coloration: Skin is not jaundiced.      Findings: No bruising.   Neurological:      General: No focal deficit present.      Mental Status: She is alert. Mental status is at baseline.   Psychiatric:         Mood and Affect: Mood normal.         Behavior: Behavior normal.          Lower extremities no edema, normal peripheral pulses, minimal varicosities, normal sensation light touch                        Assessment & Plan       Assessment  #1 restless leg symptoms 3 nights a week, does not appear to be vascular, " no sciatica, classic restless leg symptoms that do not occur during the day    #2 Sleep apnea on CPAP nightly followed by the sleep group    #3 dyslipidemia 2/12/25 chol 226,trig 147,hdl 54,ldl 143, 10-year cardiac risk 1.8% with CT calcium score of 0 done 3 years ago    #4 impaired glucose metabolism 2/12/25 A1c 5.7%    #5 history of low vitamin D most recent lab 2/12/25 vit d 36 onto the units daily     #6 hypothyroid on replacement 2/12/25 tsh 4.1 on levothyroxine 125 mcg one tab six days per     #7 history of lymphoma no recurrence followed by hematology oncology 2/12/25 wbc 7.6,hgb 13.7,hct 44, plt 326    #8 2/12/25 vit d 36 on Vit d 2000 units increase to 3000     #9 impaired glucose metabolism A1c 5.7%    #10 obesity BMI 33.59 comorbidity of dyslipidemia    #11 atherosclerosis aorta    Plan  #1 offered medication for restless leg but since symptoms occur only 3 nights a week she would like to try tonic water over-the-counter first, to let me know if symptoms worsen or become more frequent    #2 continue CPAP, follow-up sleep group    #3 continue work on nutrition, limiting sweets, candies, processed foods, she would like to try a vegetarian based diet but I do want her to try to get at least 75 to 100 g of protein daily, she can use pea protein over-the-counter, she can avoid red meats perhaps try to incorporate chicken, fish, turkey, egg whites    #4 increase her exercise program, add weight training    #5 nutrition referral to assist with dietary changes to help with weight loss    #6 follow-up oncology    #7 increase vitamin D to 3000 units    #8 mammogram and bone density ordered previously she will have that done    #9 follow-up, she can send me a [employer] message when she is ready to have the labs done, follow-up this summer    #10 offered Mounjaro since she has sleep apnea she would like to defer and work on nutrition, exercise, weight loss on her own, if not able to lose weight Mounjaro would be an  option since that is not covered by Medicare    #11 reviewed labs from 2/12/25 with her

## 2025-02-14 ENCOUNTER — RESULTS FOLLOW-UP (OUTPATIENT)
Dept: MEDICAL GROUP | Facility: MEDICAL CENTER | Age: 70
End: 2025-02-14

## 2025-02-14 LAB
APO B100 SERPL-MCNC: 120 MG/DL (ref 60–117)
LPA SERPL-MCNC: 91 MG/DL

## 2025-02-17 NOTE — Clinical Note
REFERRAL APPROVAL NOTICE         Sent on February 17, 2025                   Mari Kinsey  [address] [facility] [address]                   Dear Ms. Kinsey,    After a careful review of the medical information and benefit coverage, [facility] has processed your referral. See below for additional details.    If applicable, you must be actively enrolled with your insurance for coverage of the authorized service. If you have any questions regarding your coverage, please contact your insurance directly.    REFERRAL INFORMATION   Referral #:  [id number]  Referred-To Department    Referred-By Provider:  Nutrition    Kenneth Reinoso M.D.   [facility]      [address]  [address]  [facility] [id number] [phone] [address] [facility] [address] [phone]    Referral Start Date:  02/13/2025  Referral End Date:   02/13/2026             SCHEDULING  If you do not already have an appointment, please call [phone] to make an appointment.     MORE INFORMATION  If you do not already have a [employer] account, sign up at: [email or web address]  You can access your medical information, make appointments, see lab results, billing information, and more.  If you have questions regarding this referral, please contact  the [facility] Referrals department at:             [phone]. Monday - Friday 8:00AM - 5:00PM.     Sincerely,    [facility]

## 2025-02-28 ENCOUNTER — TELEPHONE (OUTPATIENT)
Dept: HEALTH INFORMATION MANAGEMENT | Facility: OTHER | Age: 70
End: 2025-02-28

## 2025-03-05 ENCOUNTER — HOSPITAL ENCOUNTER (OUTPATIENT)
Dept: RADIOLOGY | Facility: MEDICAL CENTER | Age: 70
End: 2025-03-05
Attending: INTERNAL MEDICINE
Payer: MEDICARE

## 2025-03-05 DIAGNOSIS — M81.0 POSTMENOPAUSAL BONE LOSS: ICD-10-CM

## 2025-03-05 DIAGNOSIS — Z12.31 ENCOUNTER FOR SCREENING MAMMOGRAM FOR BREAST CANCER: ICD-10-CM

## 2025-03-05 PROCEDURE — 77080 DXA BONE DENSITY AXIAL: CPT

## 2025-03-05 PROCEDURE — 77067 SCR MAMMO BI INCL CAD: CPT

## 2025-03-06 NOTE — TELEPHONE ENCOUNTER
Please notify the patient that her bone density test shows normal bone strength of her back and hip.  Have her continue regular weightbearing exercise and activity, have her continue vitamin D over-the-counter, calcium 1000 mg daily, and we can repeat her bone density test in 2 years.  Mammogram is also negative, she can repeat that in 1 year.

## 2025-03-12 ENCOUNTER — APPOINTMENT (OUTPATIENT)
Dept: URBAN - METROPOLITAN AREA CLINIC 35 | Facility: CLINIC | Age: 70
Setting detail: DERMATOLOGY
End: 2025-03-12

## 2025-03-12 DIAGNOSIS — D22 MELANOCYTIC NEVI: ICD-10-CM

## 2025-03-12 DIAGNOSIS — L82.1 OTHER SEBORRHEIC KERATOSIS: ICD-10-CM

## 2025-03-12 DIAGNOSIS — L71.8 OTHER ROSACEA: ICD-10-CM | Status: STABLE

## 2025-03-12 DIAGNOSIS — L81.4 OTHER MELANIN HYPERPIGMENTATION: ICD-10-CM

## 2025-03-12 DIAGNOSIS — Z71.89 OTHER SPECIFIED COUNSELING: ICD-10-CM

## 2025-03-12 DIAGNOSIS — Z87.2 PERSONAL HISTORY OF DISEASES OF THE SKIN AND SUBCUTANEOUS TISSUE: ICD-10-CM

## 2025-03-12 PROBLEM — D23.71 OTHER BENIGN NEOPLASM OF SKIN OF RIGHT LOWER LIMB, INCLUDING HIP: Status: ACTIVE | Noted: 2025-03-12

## 2025-03-12 PROBLEM — D23.72 OTHER BENIGN NEOPLASM OF SKIN OF LEFT LOWER LIMB, INCLUDING HIP: Status: ACTIVE | Noted: 2025-03-12

## 2025-03-12 PROBLEM — D22.5 MELANOCYTIC NEVI OF TRUNK: Status: ACTIVE | Noted: 2025-03-12

## 2025-03-12 PROCEDURE — ? ADDITIONAL NOTES

## 2025-03-12 PROCEDURE — 99213 OFFICE O/P EST LOW 20 MIN: CPT

## 2025-03-12 PROCEDURE — ? COUNSELING

## 2025-03-12 ASSESSMENT — LOCATION DETAILED DESCRIPTION DERM
LOCATION DETAILED: LEFT POSTERIOR SHOULDER
LOCATION DETAILED: LEFT MEDIAL UPPER BACK
LOCATION DETAILED: LEFT SUPERIOR MEDIAL MIDBACK
LOCATION DETAILED: RIGHT DISTAL PRETIBIAL REGION
LOCATION DETAILED: RIGHT SUPERIOR UPPER BACK

## 2025-03-12 ASSESSMENT — LOCATION ZONE DERM
LOCATION ZONE: LEG
LOCATION ZONE: ARM
LOCATION ZONE: TRUNK

## 2025-03-12 ASSESSMENT — LOCATION SIMPLE DESCRIPTION DERM
LOCATION SIMPLE: RIGHT PRETIBIAL REGION
LOCATION SIMPLE: LEFT UPPER BACK
LOCATION SIMPLE: RIGHT UPPER BACK
LOCATION SIMPLE: LEFT SHOULDER
LOCATION SIMPLE: LEFT LOWER BACK

## 2025-03-12 NOTE — PROCEDURE: ADDITIONAL NOTES
Render Risk Assessment In Note?: no
Additional Notes: Pt declined rx as it is not bothersome
Detail Level: Simple

## 2025-04-09 ASSESSMENT — ENCOUNTER SYMPTOMS: GENERAL WELL-BEING: GOOD

## 2025-04-09 ASSESSMENT — PATIENT HEALTH QUESTIONNAIRE - PHQ9
2. FEELING DOWN, DEPRESSED, IRRITABLE, OR HOPELESS: NOT AT ALL
1. LITTLE INTEREST OR PLEASURE IN DOING THINGS: NOT AT ALL

## 2025-04-09 ASSESSMENT — ACTIVITIES OF DAILY LIVING (ADL): BATHING_REQUIRES_ASSISTANCE: 0

## 2025-04-09 NOTE — ASSESSMENT & PLAN NOTE
Chronic, ongoing. Most recent lipid panel from 2/2025 with LDL at 143.  2022 CT Calcium scoring 0. Pt is working to improve cholesterol with diet/exercise/weight loss. She is implementing a more plant-based, Mediterranean diet. Follow up with PCP at least annually for continued monitoring and management.     Lab Results   Component Value Date/Time    CHOLSTRLTOT 226 (H) 02/12/2025 09:28 AM     (H) 02/12/2025 09:28 AM    HDL 54 02/12/2025 09:28 AM    TRIGLYCERIDE 147 02/12/2025 09:28 AM

## 2025-04-09 NOTE — ASSESSMENT & PLAN NOTE
Chronic, stable. Pt maintains on levothyroxine 125mcg six days/week with 250mcg once/week with TSH wnl as of 2/2025. Continue current treatment regime. Follow up with PCP at least annually for continued monitoring and management.   Latest Reference Range & Units 02/12/25 09:28   TSH 0.350 - 5.500 uIU/mL 4.120

## 2025-04-10 ENCOUNTER — OFFICE VISIT (OUTPATIENT)
Dept: FAMILY PLANNING/WOMEN'S HEALTH CLINIC | Facility: PHYSICIAN GROUP | Age: 70
End: 2025-04-10
Payer: MEDICARE

## 2025-04-10 VITALS
SYSTOLIC BLOOD PRESSURE: 112 MMHG | DIASTOLIC BLOOD PRESSURE: 60 MMHG | HEIGHT: 63 IN | BODY MASS INDEX: 33.84 KG/M2 | HEART RATE: 59 BPM | WEIGHT: 191 LBS | OXYGEN SATURATION: 93 %

## 2025-04-10 DIAGNOSIS — E78.5 DYSLIPIDEMIA: Chronic | ICD-10-CM

## 2025-04-10 DIAGNOSIS — E03.8 OTHER SPECIFIED HYPOTHYROIDISM: Chronic | ICD-10-CM

## 2025-04-10 DIAGNOSIS — C82.33 FOLLICULAR LYMPHOMA GRADE IIIA OF INTRA-ABDOMINAL LYMPH NODES (HCC): ICD-10-CM

## 2025-04-10 DIAGNOSIS — G47.33 OBSTRUCTIVE SLEEP APNEA SYNDROME: ICD-10-CM

## 2025-04-10 PROCEDURE — 3074F SYST BP LT 130 MM HG: CPT | Performed by: PHYSICIAN ASSISTANT

## 2025-04-10 PROCEDURE — 3078F DIAST BP <80 MM HG: CPT | Performed by: PHYSICIAN ASSISTANT

## 2025-04-10 PROCEDURE — G0439 PPPS, SUBSEQ VISIT: HCPCS | Performed by: PHYSICIAN ASSISTANT

## 2025-04-10 PROCEDURE — 1126F AMNT PAIN NOTED NONE PRSNT: CPT | Performed by: PHYSICIAN ASSISTANT

## 2025-04-10 SDOH — ECONOMIC STABILITY: FOOD INSECURITY: WITHIN THE PAST 12 MONTHS, YOU WORRIED THAT YOUR FOOD WOULD RUN OUT BEFORE YOU GOT THE MONEY TO BUY MORE.: NEVER TRUE

## 2025-04-10 SDOH — ECONOMIC STABILITY: FOOD INSECURITY: HOW HARD IS IT FOR YOU TO PAY FOR THE VERY BASICS LIKE FOOD, HOUSING, MEDICAL CARE, AND HEATING?: NOT HARD AT ALL

## 2025-04-10 SDOH — ECONOMIC STABILITY: FOOD INSECURITY: WITHIN THE PAST 12 MONTHS, THE FOOD YOU BOUGHT JUST DIDN'T LAST AND YOU DIDN'T HAVE MONEY TO GET MORE.: NEVER TRUE

## 2025-04-10 SDOH — ECONOMIC STABILITY: TRANSPORTATION INSECURITY: IN THE PAST 12 MONTHS, HAS LACK OF TRANSPORTATION KEPT YOU FROM MEDICAL APPOINTMENTS OR FROM GETTING MEDICATIONS?: NO

## 2025-04-10 ASSESSMENT — ACTIVITIES OF DAILY LIVING (ADL): LACK_OF_TRANSPORTATION: NO

## 2025-04-10 ASSESSMENT — PAIN SCALES - GENERAL: PAINLEVEL_OUTOF10: NO PAIN

## 2025-04-10 ASSESSMENT — FIBROSIS 4 INDEX: FIB4 SCORE: 0.9

## 2025-04-10 ASSESSMENT — PATIENT HEALTH QUESTIONNAIRE - PHQ9: CLINICAL INTERPRETATION OF PHQ2 SCORE: 0

## 2025-04-10 NOTE — PROGRESS NOTES
Comprehensive Health Assessment Program     Mari Kinsey is a 69 y.o. here for her comprehensive health assessment.    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)     History of hypertension 07/09/2020    Allergic rhinitis 03/09/2020    Microscopic hematuria 10/16/2015    Skin lesion 12/19/2014    Family history of breast cancer in sister 12/19/2014    Macular drusen 12/19/2014    Obesity (BMI 30.0-34.9) 12/19/2014    Sleep apnea 09/24/2010    Preventative health care 09/17/2010    S/p bunion surgery left foot 09/17/2010    Deviated nasal septum 09/17/2010    Dyslipidemia 09/17/2010    Hypothyroid 09/16/2010       Current Outpatient Medications   Medication Sig Dispense Refill    levothyroxine (SYNTHROID) 125 MCG Tab Frequency change to one pill 6 days per week, two pills on day 7 so a total of 8 tabs every 7 days  Indications: low thyroid 96 Tablet 2    Magnesium Glycinate 100 MG Cap Take 200 mg by mouth every day.          **MEDICATION INSTRUCTIONS FOR SURGERY/PROCEDURE SCHEDULED FOR 10/8/2024   DO NOT TAKE FOR 7 DAYS PRIOR TO SURGERY      Multiple Vitamins-Minerals (PRESERVISION AREDS 2 PO) Take  by mouth every day.         **MEDICATION INSTRUCTIONS FOR SURGERY/PROCEDURE SCHEDULED FOR 10/8/2024   DO NOT TAKE FOR 7 DAYS PRIOR TO SURGERY      vitamin D (CHOLECALCIFEROL) 1000 UNIT Tab Take 2,000 Units by mouth every day.         **MEDICATION INSTRUCTIONS FOR SURGERY/PROCEDURE SCHEDULED FOR 10/8/2024   DO NOT TAKE FOR 7 DAYS PRIOR TO SURGERY       No current facility-administered medications for this visit.           Current supplements as per medication list.     Allergies:   Penicillins  Social History     Tobacco Use    Smoking status: Never     Passive exposure: Yes    Smokeless tobacco: Never    Tobacco comments:     parents smoke when she was a child   Vaping Use    Vaping status: Never Used   Substance Use Topics    Alcohol use: Never    Drug use: Never     Family History   Problem Relation Age of Onset    Arthritis Mother         osteoporosis    Hypertension Mother     Hyperlipidemia Mother     Heart Disease Father         Heart murmur    Lung Disease Father         Emphysema (longtime smoker)    No Known Problems Sister     No Known Problems Sister     Alcohol abuse Sister     No Known Problems Brother     No Known Problems Brother     No Known Problems Brother     Sleep Apnea Neg Hx      Mari  has a past medical history of Cancer ([facility]), Chickenpox, Dental disorder, Bulgarian measles, Gynecological disorder (4/24), History of lymphoma (02/12/2025), Hypothyroidism, Influenza, Mumps, ALLEN (obstructive sleep apnea), Sleep apnea, Snoring, Thyroid disease, and Tonsillitis.    She has no past medical history of Acute nasopharyngitis, Anesthesia, Anginal syndrome ([facility]), Arrhythmia, Arthritis, Asthma, Blood clotting disorder ([facility]), Bowel habit changes, Breath shortness, Bronchitis, Carcinoma in situ of respiratory system, Congestive heart failure (HCC), Continuous ambulatory peritoneal dialysis status ([facility]), Coughing blood, Dialysis patient ([facility]), Emphysema of lung (HCC), Glaucoma, Heart burn, Heart murmur, Heart valve disease, Hemorrhagic disorder (HCC), Hepatitis A, Hepatitis B, Hepatitis C, Hiatus hernia syndrome, High cholesterol, Hypertension, Indigestion, Jaundice, Myocardial infarct (HCC), Pacemaker, Pain, Pneumonia, Pregnant, Psychiatric problem, Renal disorder, Rheumatic fever, Seizure (HCC), Stroke ([facility]), Tuberculosis, Urinary bladder disorder, or Urinary incontinence.   Past Surgical History:   Procedure Laterality  Date    LAPAROSCOPIC ASSISTED VAGINAL HYSTERECTOMY  10/8/2024    Procedure: LAPAROSCOPIC ASSISTED VAGINAL HYSTERECTOMY, BILATERAL SALPINGO-OOPHORECTOMY, VAULT SUSPENSION, ANTERIOR REPAIR, POSTERIOR REPAIR, PERINEORRHAPHY, CYSTOSCOPY AND ALL INDICATED PROCEDURES;  Surgeon: Magdalene Sorto M.D.;  Location: SURGERY SAME DAY [facility];  Service: Gynecology    VAGINAL SUSPENSION  10/8/2024    Procedure: COLPOPEXY;  Surgeon: Magdalene Sorto M.D.;  Location: SURGERY SAME DAY [facility];  Service: Gynecology    HYSTEROSCOPY WITH MYOSURE N/A 04/03/2024    Procedure: HYSTEROSCOPY, DILATION AND CURETTAGE;  Surgeon: Crista Wallace D.O.;  Location: SURGERY SAME DAY [facility];  Service: Gynecology    DILATION AND CURETTAGE N/A 04/03/2024    Procedure: DILATION AND CURETTAGE;  Surgeon: Crista Wallace D.O.;  Location: SURGERY SAME DAY [facility];  Service: Gynecology    KALE BY LAPAROSCOPY  02/03/2016    Procedure: KALE BY LAPAROSCOPY;  Surgeon: Tricia Farah M.D.;  Location: SURGERY SAME DAY [facility];  Service:     LYMPH NODE SAMPLING  02/03/2016    Procedure: LYMPH NODE SAMPLING EXCISION, BIOPSY;  Surgeon: Tricia Farah M.D.;  Location: SURGERY SAME DAY [facility];  Service:     BONE GRAFT      at 19 years of age    BUNIONECTOMY      HYSTEROSCOPY WITH VIDEO DIAGNOSTIC      NASAL SEPTAL RECONSTRUCTION      OTHER      bone graft to ;hand    OTHER ABDOMINAL SURGERY  2019?    Removed gall bladder    OTHER ORTHOPEDIC SURGERY  1973    Bone graft from hip to 5th metacarpal (only cartilage there)    TONSILLECTOMY         Screening:  In the last six months have you experienced any leakage of urine? No    Depression Screening  Little interest or pleasure in doing things?  0 - not at all  Feeling down, depressed , or hopeless? 0 - not at all  Patient Health Questionnaire Score: 0     If depressive symptoms identified deferred to follow up visit unless specifically addressed in assessment and plan.    Interpretation of PHQ-9 Total  Score   Score Severity   1-4 No Depression   5-9 Mild Depression   10-14 Moderate Depression   15-19 Moderately Severe Depression   20-27 Severe Depression    Screening for Cognitive Impairment  Do you or any of your friends or family members have any concern about your memory? No  Three Minute Recall (Village, Kitchen, Baby) 3/3    Nakul clock face with all 12 numbers and set the hands to show 10 minutes past 11.  Yes 5  Cognitive concerns identified deferred for follow up unless specifically addressed in assessment and plan.    Fall Risk Assessment  Has the patient had two or more falls in the last year or any fall with injury in the last year?  No    Safety Assessment  Do you always wear your seatbelt?  Yes  Any changes to home needed to function safely? No  Difficulty hearing.  No  Patient counseled about all safety risks that were identified.    Functional Assessment ADLs  Are there any barriers preventing you from cooking for yourself or meeting nutritional needs?  No.    Are there any barriers preventing you from driving safely or obtaining transportation?  No.    Are there any barriers preventing you from using a telephone or calling for help?  No    Are there any barriers preventing you from shopping?  No.    Are there any barriers preventing you from taking care of your own finances?  No    Are there any barriers preventing you from managing your medications?  No    Are there any barriers preventing you from showering, bathing or dressing yourself? No    Are there any barriers preventing you from doing housework or laundry? No  Are there any barriers preventing you from using the toilet?No  Are you currently engaging in any exercise or physical activity?  Yes. Walking     Self-Assessment of Health  What is your perception of your health? Good    Do you sleep more than six hours a night? No Sleeps about 6  In the past 7 days, how much did pain keep you from doing your normal work? None    Do you spend  quality time with family or friends (virtually or in person)? Yes    Do you usually eat a heart healthy diet that constists of a variety of fruits, vegetables, whole grains and fiber? Yes    Do you eat foods high in fat and/or Fast Food more than three times per week? Yes    How concerned are you that your medical conditions are not being well managed? Not at all    Are you worried that in the next 2 months, you may not have stable housing that you own, rent, or stay in as part of a household? No      Advance Care Planning  Do you have an Advance Directive, Living Will, Durable Power of , or POLST? Yes  Advance Directive       is on file      Health Maintenance Summary            Current Care Gaps       COVID-19 Vaccine (7 - 2024-25 season) Overdue since 3/26/2025      09/26/2024  Imm Admin: COVID-19, mRNA, LNP-S, PF, jorge l-sucrose, 30 mcg/0.3 mL    09/25/2023  Imm Admin: COVID-19, mRNA, LNP-S, PF, jorge l-sucrose, 30 mcg/0.3 mL    09/28/2022  Imm Admin: MODERNA BIVALENT BOOSTER SARS-COV-2 VACCINE (6+)    04/21/2022  Imm Admin: MODERNA SARS-COV-2 VACCINE (12+)    10/26/2021  Imm Admin: MODERNA SARS-COV-2 VACCINE (12+)     Only the first 5 history entries have been loaded, but more history exists.                    Upcoming       Mammogram (Yearly) Next due on 3/5/2026      03/05/2025  MA-SCREENING MAMMO BILAT W/TOMOSYNTHESIS W/CAD    08/14/2023  MA-SCREENING MAMMO BILAT W/TOMOSYNTHESIS W/CAD    02/01/2022  MA-SCREENING MAMMO BILAT W/TOMOSYNTHESIS W/CAD    03/13/2019  MA-SCREENING MAMMO BILAT W/TOMOSYNTHESIS W/CAD    03/13/2019  [id number]      Only the first 5 history entries have been loaded, but more history exists.              Annual Wellness Visit (Yearly) Next due on 4/10/2026      04/10/2025  Level of Service: [place] ANNUAL WELLNESS VISIT-INCLUDES PPPS SUBSEQUE*    04/18/2024  Level of Service: [place] ANNUAL WELLNESS VISIT-INCLUDES PPPS SUBSEQUE*    04/17/2023  Level of Service: [place] ANNUAL WELLNESS  VISIT-INCLUDES PPPS SUBSEQUE*    11/14/2022  Visit Dx: Medicare annual wellness visit, subsequent    05/27/2022  Level of Service: [place] ANNUAL WELLNESS VISIT-INCLUDES PPPS SUBSEQUE*      Only the first 5 history entries have been loaded, but more history exists.              Bone Density Scan (Every 5 Years) Next due on 3/5/2030      03/05/2025  DS-BONE DENSITY STUDY (DEXA)    02/01/2022  DS-BONE DENSITY STUDY (DEXA)    12/29/2016  DS-BONE DENSITY STUDY (DEXA)              Colorectal Cancer Screening (Colonoscopy - Preferred) Next due on 3/22/2031      03/22/2021  REFERRAL TO GI FOR COLONOSCOPY    01/28/2020  OCCULT BLOOD FECES IMMUNOASSAY    01/14/2019  OCCULT BLOOD FECES IMMUNOASSAY    11/17/2010  AMB REFERRAL TO GI FOR COLONOSCOPY              IMM DTaP/Tdap/Td Vaccine (3 - Td or Tdap) Next due on 3/14/2032      03/14/2022  Imm Admin: Tdap Vaccine    12/09/2011  Imm Admin: Tdap Vaccine                      Completed or No Longer Recommended       Hepatitis B Vaccine (Hep B) (Series Information) Completed      08/04/2017  Imm Admin: Hepatitis B Vaccine (Adol/Adult)    02/03/2017  Imm Admin: Hepatitis B Vaccine (Adol/Adult)    12/22/2016  Imm Admin: Hepatitis B Vaccine (Adol/Adult)              Influenza Vaccine (Series Information) Completed      10/26/2024  Imm Admin: Influenza high-dose trivalent (PF)    09/05/2023  Imm Admin: Influenza Vaccine Adult HD    09/06/2022  Imm Admin: Influenza Vaccine Adult HD    10/18/2021  Imm Admin: Influenza Vaccine Quad Inj (Pf)    09/27/2020  Imm Admin: Influenza Vaccine Quad Inj (Pf)      Only the first 5 history entries have been loaded, but more history exists.              Zoster (Shingles) Vaccines (Series Information) Completed      11/26/2019  Imm Admin: Zoster Vaccine Recombinant (RZV) (SHINGRIX)    09/10/2019  Imm Admin: Zoster Vaccine Recombinant (RZV) (SHINGRIX)    09/02/2014  Imm Admin: Zoster Vaccine Live (ZVL) (Zostavax) - HISTORICAL DATA              Hepatitis C  Screening  Completed      01/17/2024  Done    12/23/2015  Hepatitis C Antibody component of HEPATITIS PANEL ACUTE(4 COMPONENTS)              Pneumococcal Vaccine: 50+ Years (Series Information) Completed      11/14/2022  Imm Admin: Pneumococcal Conjugate Vaccine (PCV20)    11/08/2021  Imm Admin: Pneumococcal Conjugate Vaccine (Prevnar/PCV-13)    12/22/2016  Imm Admin: Pneumococcal polysaccharide vaccine (PPSV-23)              Hepatitis A Vaccine (Hep A) (Series Information) Aged Out      No completion history exists for this topic.              HPV Vaccines (Series Information) Aged Out     No completion history exists for this topic.              Polio Vaccine (Inactivated Polio) (Series Information) Aged Out     No completion history exists for this topic.              Meningococcal Immunization (Series Information) Aged Out     No completion history exists for this topic.              Cervical Cancer Screening  Discontinued        Frequency changed to Never automatically (Topic No Longer Applies)    02/23/2017  Done    02/23/2017  Done -     02/23/2017  PATHOLOGY GYN SPECIMEN    02/23/2017  THINPREP PAP WITH HPV     Only the first 5 history entries have been loaded, but more history exists.                          Patient Care Team:  Kenneth Reinoso M.D. as PCP - General  Kenneth Reinoso M.D. as PCP - [facility] Paneled  Marguerite Barajas R.N. (Inactive) as   ISLEEP as Respiratory Therapist (DME Supplier)      Financial Resource Strain: Low Risk  (4/10/2025)    Overall Financial Resource Strain (CARDIA)     Difficulty of Paying Living Expenses: Not hard at all      Transportation Needs: No Transportation Needs (4/10/2025)    PRAPARE - Transportation     Lack of Transportation (Medical): No     Lack of Transportation (Non-Medical): No      Food Insecurity: No Food Insecurity (4/10/2025)    Hunger Vital Sign     Worried About Running Out of Food in the Last Year: Never true     Ran Out of Food in  "the Last Year: Never true        Encounter Vitals  Blood Pressure : 112/60  Pulse: (!) 59  Pulse Oximetry: 93 %  Weight: 86.6 kg (191 lb)  Height: 160 cm (5' 3\") (pt reported)  BMI (Calculated): 33.83  Pain Score: No pain     Physical Exam:  Constitutional: NAD  HENMT: NC/AT, EOMI  Cardiovascular: RRR, No m/r/g  Lungs: CTAB, no w/r/r  Extremities: No c/c/e  Skin: No lesions notes  Neurologic: Alert & oriented x3, CN II-XII grossly intact    Assessment and Plan. The following treatment and monitoring plan is recommended:  Dyslipidemia  Chronic, ongoing. Most recent lipid panel from 2/2025 with LDL at 143.  2022 CT Calcium scoring 0. Pt is working to improve cholesterol with diet/exercise/weight loss. She is implementing a more plant-based, Mediterranean diet. Follow up with PCP at least annually for continued monitoring and management.     Lab Results   Component Value Date/Time    CHOLSTRLTOT 226 (H) 02/12/2025 09:28 AM     (H) 02/12/2025 09:28 AM    HDL 54 02/12/2025 09:28 AM    TRIGLYCERIDE 147 02/12/2025 09:28 AM       Hypothyroid  Chronic, stable. Pt maintains on levothyroxine 125mcg six days/week with 250mcg once/week with TSH wnl as of 2/2025. Continue current treatment regime. Follow up with PCP at least annually for continued monitoring and management.   Latest Reference Range & Units 02/12/25 09:28   TSH 0.350 - 5.500 uIU/mL 4.120       Sleep apnea  Chronic, stable. Pt is compliant on nightly CPAP use. Follow up with sleep medicine per routine.    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 Pt undergoes serial imaging and testing at [facility]. Per last onc note, last CT from 12/2024 demonstrated stable adenopathy except paraaortic LN increased from 1 to 2 cm. She does not require any treatmeant at this time and is under wait and watch protocol. Follow up with oncology per routine.    Services suggested: No services needed at this time  Health Care Screening: " Age-appropriate preventive services recommended by USPTF and ACIP covered by Medicare were discussed today. Services ordered if indicated and agreed upon by the patient.  Referrals offered: Community-based lifestyle interventions to reduce health risks and promote self-management and wellness, fall prevention, nutrition, physical activity, tobacco-use cessation, weight loss, and mental health services as per orders if indicated.    Discussion today about general wellness and lifestyle habits:    Prevent falls and reduce trip hazards; Cautioned about securing or removing rugs.  Have a working fire alarm and carbon monoxide detector.  Engage in regular physical activity and social activities.    Follow-up: No follow-ups on file.

## 2025-04-10 NOTE — ASSESSMENT & PLAN NOTE
Chronic, ongoing. Pt undergoes serial imaging and testing at [facility]. Per last onc note, last CT from 12/2024 demonstrated stable adenopathy except paraaortic LN increased from 1 to 2 cm. She does not require any treatmeant at this time and is under wait and watch protocol. Follow up with oncology per routine.

## 2025-05-29 ENCOUNTER — TELEPHONE (OUTPATIENT)
Dept: MEDICAL GROUP | Facility: MEDICAL CENTER | Age: 70
End: 2025-05-29
Payer: MEDICARE

## 2025-05-29 NOTE — TELEPHONE ENCOUNTER
VOICEMAIL  1. Caller Name: Mari Kinsey                       Call Back Number: [phone]    2. Message: Wanted to let you know she is out of town and forgot to  her rx refills and wants to know if you can send it to a different pharmacy so she can have it while she is there for a few more weeks. She has three pills left.     Can you send it to: [facility] [address], [place] [address]    She would like a 3 months supply so she can have it there but then the next the time she has it refilled she will have it re filled in [place].

## 2025-05-30 NOTE — TELEPHONE ENCOUNTER
I did review the message, please check with the patient as I need to know which pharmacy this is going to, as the only listed information is an address but not the name of the pharmacy.    Also please verify which medication this is for, I assume this is just for her thyroid medication?

## 2025-05-30 NOTE — TELEPHONE ENCOUNTER
Received request via: Pharmacy    Was the patient seen in the last year in this department? Yes    Does the patient have an active prescription (recently filled or refills available) for medication(s) requested? No    Pharmacy Name: [address]     Does the patient have shelter Plus and need 100-day supply? (This applies to ALL medications) Yes, quantity updated to 100 days        Voicemail from yesterday. Patient is requesting med refill while out of town.

## 2025-05-31 RX ORDER — LEVOTHYROXINE SODIUM 125 UG/1
TABLET ORAL
Qty: 96 TABLET | Refills: 0 | Status: SHIPPED | OUTPATIENT
Start: 2025-05-31

## 2025-06-06 NOTE — TELEPHONE ENCOUNTER
I think this was for her thyroid medication, we already sent that refill to the [facility] in [place] on 5/31/25

## 2025-06-06 NOTE — TELEPHONE ENCOUNTER
Called pt and asked them to send us a [employer] message or call back to shree what medication is needing to be refilled.

## 2025-06-09 ENCOUNTER — TELEPHONE (OUTPATIENT)
Dept: MEDICAL GROUP | Facility: MEDICAL CENTER | Age: 70
End: 2025-06-09
Payer: MEDICARE

## 2025-07-31 ENCOUNTER — TELEPHONE (OUTPATIENT)
Dept: MEDICAL GROUP | Facility: MEDICAL CENTER | Age: 70
End: 2025-07-31
Payer: MEDICARE

## 2025-07-31 DIAGNOSIS — E03.8 OTHER SPECIFIED HYPOTHYROIDISM: Primary | Chronic | ICD-10-CM

## 2025-07-31 DIAGNOSIS — R31.29 MICROSCOPIC HEMATURIA: ICD-10-CM

## 2025-07-31 DIAGNOSIS — R73.09 IMPAIRED GLUCOSE METABOLISM: ICD-10-CM

## 2025-07-31 DIAGNOSIS — E78.5 DYSLIPIDEMIA: Chronic | ICD-10-CM

## 2025-07-31 DIAGNOSIS — E61.1 IRON DEFICIENCY: ICD-10-CM

## 2025-08-06 ENCOUNTER — HOSPITAL ENCOUNTER (OUTPATIENT)
Facility: MEDICAL CENTER | Age: 70
End: 2025-08-06
Attending: INTERNAL MEDICINE
Payer: MEDICARE

## 2025-08-06 DIAGNOSIS — E61.1 IRON DEFICIENCY: ICD-10-CM

## 2025-08-06 DIAGNOSIS — R73.09 IMPAIRED GLUCOSE METABOLISM: ICD-10-CM

## 2025-08-06 DIAGNOSIS — E78.5 DYSLIPIDEMIA: Chronic | ICD-10-CM

## 2025-08-06 LAB
CHOLEST SERPL-MCNC: 231 MG/DL (ref 100–199)
EST. AVERAGE GLUCOSE BLD GHB EST-MCNC: 123 MG/DL
FASTING STATUS PATIENT QL REPORTED: NORMAL
FERRITIN SERPL-MCNC: 53.7 NG/ML (ref 10–291)
HBA1C MFR BLD: 5.9 % (ref 4–5.6)
HDLC SERPL-MCNC: 55 MG/DL
IRON SATN MFR SERPL: 20 % (ref 15–55)
IRON SERPL-MCNC: 60 UG/DL (ref 40–170)
LDLC SERPL CALC-MCNC: 154 MG/DL
TIBC SERPL-MCNC: 297 UG/DL (ref 250–450)
TRIGL SERPL-MCNC: 108 MG/DL (ref 0–149)
TSH SERPL DL<=0.005 MIU/L-ACNC: 3.58 UIU/ML (ref 0.38–5.33)
UIBC SERPL-MCNC: 237 UG/DL (ref 110–370)

## 2025-08-06 PROCEDURE — 83540 ASSAY OF IRON: CPT

## 2025-08-06 PROCEDURE — 84443 ASSAY THYROID STIM HORMONE: CPT

## 2025-08-06 PROCEDURE — 83550 IRON BINDING TEST: CPT

## 2025-08-06 PROCEDURE — 36415 COLL VENOUS BLD VENIPUNCTURE: CPT

## 2025-08-06 PROCEDURE — 83036 HEMOGLOBIN GLYCOSYLATED A1C: CPT

## 2025-08-06 PROCEDURE — 82728 ASSAY OF FERRITIN: CPT

## 2025-08-06 PROCEDURE — 80061 LIPID PANEL: CPT

## 2025-08-07 ENCOUNTER — TELEPHONE (OUTPATIENT)
Dept: MEDICAL GROUP | Facility: MEDICAL CENTER | Age: 70
End: 2025-08-07

## 2025-08-07 ENCOUNTER — OFFICE VISIT (OUTPATIENT)
Dept: MEDICAL GROUP | Facility: MEDICAL CENTER | Age: 70
End: 2025-08-07
Payer: MEDICARE

## 2025-08-07 VITALS
WEIGHT: 190 LBS | HEART RATE: 63 BPM | OXYGEN SATURATION: 97 % | BODY MASS INDEX: 34.96 KG/M2 | HEIGHT: 62 IN | SYSTOLIC BLOOD PRESSURE: 120 MMHG | TEMPERATURE: 98.4 F | DIASTOLIC BLOOD PRESSURE: 76 MMHG

## 2025-08-07 DIAGNOSIS — E78.5 DYSLIPIDEMIA: Chronic | ICD-10-CM

## 2025-08-07 DIAGNOSIS — E66.811 OBESITY (BMI 30.0-34.9): ICD-10-CM

## 2025-08-07 DIAGNOSIS — G47.33 OBSTRUCTIVE SLEEP APNEA SYNDROME: Primary | ICD-10-CM

## 2025-08-07 DIAGNOSIS — R73.09 IMPAIRED GLUCOSE METABOLISM: ICD-10-CM

## 2025-08-07 PROBLEM — I70.0 ATHEROSCLEROSIS OF AORTA (HCC): Status: RESOLVED | Noted: 2023-04-17 | Resolved: 2025-08-07

## 2025-08-07 PROCEDURE — 99214 OFFICE O/P EST MOD 30 MIN: CPT | Performed by: INTERNAL MEDICINE

## 2025-08-07 PROCEDURE — 3074F SYST BP LT 130 MM HG: CPT | Performed by: INTERNAL MEDICINE

## 2025-08-07 PROCEDURE — 3078F DIAST BP <80 MM HG: CPT | Performed by: INTERNAL MEDICINE

## 2025-08-07 RX ORDER — LEVOTHYROXINE SODIUM 125 UG/1
TABLET ORAL
Qty: 96 TABLET | Refills: 3 | Status: SHIPPED | OUTPATIENT
Start: 2025-08-07 | End: 2025-08-15

## 2025-08-07 ASSESSMENT — FIBROSIS 4 INDEX: FIB4 SCORE: 0.9

## 2025-08-08 RX ORDER — LEVOTHYROXINE SODIUM 125 UG/1
TABLET ORAL
Qty: 96 TABLET | Refills: 2 | OUTPATIENT
Start: 2025-08-08

## 2025-08-15 RX ORDER — LEVOTHYROXINE SODIUM 125 UG/1
TABLET ORAL
Qty: 96 TABLET | Refills: 0 | Status: SHIPPED | OUTPATIENT
Start: 2025-08-15

## (undated) DEVICE — MASK OXYGEN VNYL ADLT MED CONC WITH 7 FOOT TUBING - (50EA/CA)

## (undated) DEVICE — SUTURE 2-0 VICRYL PLUS SH - 8 X 18 INCH (12/BX)

## (undated) DEVICE — Device

## (undated) DEVICE — DRAPE UNDER BUTTOCKS FLUID - (20/CA)

## (undated) DEVICE — DRAPESURG STERI-DRAPE LONG - (10/BX 4BX/CA)

## (undated) DEVICE — SENSOR OXIMETER ADULT SPO2 RD SET (20EA/BX)

## (undated) DEVICE — PAD SANITARY 11IN MAXI IND WRAPPED  (12EA/PK 24PK/CA)

## (undated) DEVICE — CANNULA O2 COMFORT SOFT EAR ADULT 7 FT TUBING (50/CA)

## (undated) DEVICE — GLOVE BIOGEL PI ORTHO SZ 6 SURGICAL PF LF (40PR/BX)

## (undated) DEVICE — TOWEL STOP TIMEOUT SAFETY FLAG (40EA/CA)

## (undated) DEVICE — GLOVE BIOGEL INDICATOR SZ 7.5 SURGICAL PF LTX - (50PR/BX 4BX/CA)

## (undated) DEVICE — WATER IRRIG. STER 3000 ML - (4/CA)

## (undated) DEVICE — DECANTER FLD BLS - (50/CA)

## (undated) DEVICE — GOWN WARMING STANDARD FLEX - (30/CA)

## (undated) DEVICE — TRAY FOLEY CATHETER STATLOCK 16FR SURESTEP (10EA/CA)

## (undated) DEVICE — SUCTION INSTRUMENT YANKAUER BULBOUS TIP W/O VENT (50EA/CA)

## (undated) DEVICE — DEVICE REMOVAL MYOSURE TISSUE (3EA/BX)

## (undated) DEVICE — KIT  I.V. START (100EA/CA)

## (undated) DEVICE — GLOVE SZ 6 BIOGEL PI MICRO - PF LF (50PR/BX 4BX/CA)

## (undated) DEVICE — CANISTER SUCTION RIGID RED 1500CC (40EA/CA)

## (undated) DEVICE — NEEDLE INSFL 120MM 14GA VRRS - (20/BX)

## (undated) DEVICE — WATER IRRIGATION STERILE 1000ML (12EA/CA)

## (undated) DEVICE — NEEDLE MAYO CATGUT TPR POINT - (2EA/PK20PK/BX)

## (undated) DEVICE — SET LEADWIRE 5 LEAD BEDSIDE DISPOSABLE ECG (1SET OF 5/EA)

## (undated) DEVICE — SPECIMEN PLASTIC CONVERTOR - (10/CA)

## (undated) DEVICE — DERMABOND ADVANCED - (12EA/BX)

## (undated) DEVICE — MASK OXYGEN VNYL ADLT MED CONC WITH 7 FOOT TUBING  - (50EA/CA)

## (undated) DEVICE — ELECTRODE DUAL RETURN W/ CORD - (50/PK)

## (undated) DEVICE — TUBING CLEARLINK DUO-VENT - C-FLO (48EA/CA)

## (undated) DEVICE — UTERINE MANIP V-CARE STANDARD DAVINCI (8EA/CA)

## (undated) DEVICE — SUTURE 2-0 STRATAFIX SPIRAL PDS SH (12EA/BX)

## (undated) DEVICE — SUTURE 2-0 VICRYL PLUS SH - 27 INCH (36/BX)

## (undated) DEVICE — SLEEVE VASO CALF MED - (10PR/CA)

## (undated) DEVICE — SUTURE GENERAL

## (undated) DEVICE — GLOVE BIOGEL PI INDICATOR SZ 6.5 SURGICAL PF LF - (50/BX 4BX/CA)

## (undated) DEVICE — SYRINGE 10 ML CONTROL LL (25EA/BX 4BX/CA)

## (undated) DEVICE — TUBE CONNECTING SUCTION - CLEAR PLASTIC STERILE 72 IN (50EA/CA)

## (undated) DEVICE — SODIUM CHL IRRIGATION 0.9% 1000ML (12EA/CA)

## (undated) DEVICE — SODIUM CHL. IRRIGATION 0.9% 3000ML (4EA/CA 65CA/PF)

## (undated) DEVICE — SET SUCTION/IRRIGATION WITH DISPOSABLE TIP (6/CA )PART #0250-070-520 IS A SUB

## (undated) DEVICE — SUTURE 0 VICRYL PLUS CT-2 - 8 X 18 INCH (12/BX)

## (undated) DEVICE — SET TUBING AQUILEX IN-FLOW MYOSURE (10EA/BX)

## (undated) DEVICE — CANISTER SUCTION 3000ML MECHANICAL FILTER AUTO SHUTOFF MEDI-VAC NONSTERILE LF DISP  (40EA/CA)

## (undated) DEVICE — SET TUBING AQUILEX OUT-FLOW MYOSURE (10EA/BX)

## (undated) DEVICE — GLOVE BIOGEL SZ 7 SURGICAL PF LTX - (50PR/BX 4BX/CA)

## (undated) DEVICE — CANISTER SUCTION 3000ML MECHANICAL FILTER AUTO SHUTOFF MEDI-VAC NONSTERILE LF DISP (40EA/CA)

## (undated) DEVICE — LIGASURE LAPAROSCOPIC 5MM - (6EA/CA)

## (undated) DEVICE — SET IRRIGATION CYSTOSCOPY TUBE L80 IN (20EA/CA)

## (undated) DEVICE — SUTURE DEVICE CLOSURE ABSORBABLE VLOC TAPER GS 22 12IN (12EA/CT)

## (undated) DEVICE — BLADE SURGICAL #10 - (50/BX)

## (undated) DEVICE — SCRUB SOLUTION EXIDINE 4% 40Z - 48/CS CHLORAHEXADINE GLUCONATE

## (undated) DEVICE — CANNULA W/SEAL 5X100 Z-THRE - ADED KII (12/BX)

## (undated) DEVICE — SLEEVE VASO DVT COMPRESSION CALF MED - (10PR/CA)

## (undated) DEVICE — LACTATED RINGERS INJ 1000 ML - (14EA/CA 60CA/PF)

## (undated) DEVICE — SUTURE 4-0 MONOCRYL PLUSPC-3 - 18 INCH (12/BX)

## (undated) DEVICE — MASK AIRWAY SIZE 4 UNIQUE SILICON (10EA/BX)

## (undated) DEVICE — JELLY SURGILUBE STERILE TUBE 4.25 OZ (1/EA)

## (undated) DEVICE — GLOVE BIOGEL SZ 6 PF LATEX - (50EA/BX 4BX/CA)

## (undated) DEVICE — SUTURE 0 VICRYL PLUS CT-2 - 27 INCH (36/BX)

## (undated) DEVICE — TROCAR 5X100 NON BLADED Z-TH - READ KII (6/BX)

## (undated) DEVICE — PAD SANITARY 11IN MAXI IND WRAPPED (12EA/PK 24PK/CA)

## (undated) DEVICE — SET TUBING PNEUMOCLEAR HIGH FLOW SMOKE EVACUATION (10EA/BX)